# Patient Record
Sex: FEMALE | Race: WHITE | NOT HISPANIC OR LATINO
[De-identification: names, ages, dates, MRNs, and addresses within clinical notes are randomized per-mention and may not be internally consistent; named-entity substitution may affect disease eponyms.]

---

## 2017-01-12 ENCOUNTER — RESULT REVIEW (OUTPATIENT)
Age: 82
End: 2017-01-12

## 2017-01-16 ENCOUNTER — NON-APPOINTMENT (OUTPATIENT)
Age: 82
End: 2017-01-16

## 2017-01-16 ENCOUNTER — APPOINTMENT (OUTPATIENT)
Dept: CARDIOLOGY | Facility: CLINIC | Age: 82
End: 2017-01-16

## 2017-01-16 VITALS
HEIGHT: 66 IN | BODY MASS INDEX: 22.5 KG/M2 | HEART RATE: 74 BPM | SYSTOLIC BLOOD PRESSURE: 148 MMHG | DIASTOLIC BLOOD PRESSURE: 70 MMHG | WEIGHT: 140 LBS

## 2017-01-17 ENCOUNTER — OTHER (OUTPATIENT)
Age: 82
End: 2017-01-17

## 2017-01-17 LAB
ALBUMIN SERPL ELPH-MCNC: 4.2 G/DL
ALP BLD-CCNC: 136 U/L
ALT SERPL-CCNC: 45 U/L
ANION GAP SERPL CALC-SCNC: 16 MMOL/L
AST SERPL-CCNC: 43 U/L
BASOPHILS # BLD AUTO: 0.05 K/UL
BASOPHILS NFR BLD AUTO: 0.7 %
BILIRUB SERPL-MCNC: 1 MG/DL
BUN SERPL-MCNC: 37 MG/DL
CALCIUM SERPL-MCNC: 10.1 MG/DL
CHLORIDE SERPL-SCNC: 98 MMOL/L
CHOLEST SERPL-MCNC: 129 MG/DL
CHOLEST/HDLC SERPL: 2.3 RATIO
CO2 SERPL-SCNC: 28 MMOL/L
CREAT SERPL-MCNC: 1.14 MG/DL
EOSINOPHIL # BLD AUTO: 0.26 K/UL
EOSINOPHIL NFR BLD AUTO: 3.6 %
GLUCOSE SERPL-MCNC: 143 MG/DL
HCT VFR BLD CALC: 39.3 %
HDLC SERPL-MCNC: 55 MG/DL
HGB BLD-MCNC: 12.4 G/DL
IMM GRANULOCYTES NFR BLD AUTO: 0.1 %
LDLC SERPL CALC-MCNC: 54 MG/DL
LYMPHOCYTES # BLD AUTO: 1.41 K/UL
LYMPHOCYTES NFR BLD AUTO: 19.3 %
MAN DIFF?: NORMAL
MCHC RBC-ENTMCNC: 31.6 GM/DL
MCHC RBC-ENTMCNC: 32.2 PG
MCV RBC AUTO: 102.1 FL
MONOCYTES # BLD AUTO: 0.77 K/UL
MONOCYTES NFR BLD AUTO: 10.5 %
NEUTROPHILS # BLD AUTO: 4.8 K/UL
NEUTROPHILS NFR BLD AUTO: 65.8 %
NT-PROBNP SERPL-MCNC: 2735 PG/ML
PLATELET # BLD AUTO: 197 K/UL
POTASSIUM SERPL-SCNC: 3.9 MMOL/L
PROT SERPL-MCNC: 7.4 G/DL
RBC # BLD: 3.85 M/UL
RBC # FLD: 14.9 %
SODIUM SERPL-SCNC: 142 MMOL/L
TRIGL SERPL-MCNC: 98 MG/DL
WBC # FLD AUTO: 7.3 K/UL

## 2017-02-18 ENCOUNTER — RX RENEWAL (OUTPATIENT)
Age: 82
End: 2017-02-18

## 2017-03-24 ENCOUNTER — OUTPATIENT (OUTPATIENT)
Dept: OUTPATIENT SERVICES | Facility: HOSPITAL | Age: 82
LOS: 1 days | End: 2017-03-24
Payer: MEDICARE

## 2017-03-24 ENCOUNTER — TRANSCRIPTION ENCOUNTER (OUTPATIENT)
Age: 82
End: 2017-03-24

## 2017-03-24 VITALS
WEIGHT: 138.45 LBS | OXYGEN SATURATION: 98 % | DIASTOLIC BLOOD PRESSURE: 55 MMHG | HEART RATE: 65 BPM | SYSTOLIC BLOOD PRESSURE: 166 MMHG | TEMPERATURE: 97 F | RESPIRATION RATE: 18 BRPM | HEIGHT: 63.5 IN

## 2017-03-24 VITALS
OXYGEN SATURATION: 95 % | HEART RATE: 59 BPM | DIASTOLIC BLOOD PRESSURE: 69 MMHG | SYSTOLIC BLOOD PRESSURE: 156 MMHG | RESPIRATION RATE: 14 BRPM

## 2017-03-24 DIAGNOSIS — Z98.890 OTHER SPECIFIED POSTPROCEDURAL STATES: Chronic | ICD-10-CM

## 2017-03-24 DIAGNOSIS — Z96.641 PRESENCE OF RIGHT ARTIFICIAL HIP JOINT: Chronic | ICD-10-CM

## 2017-03-24 DIAGNOSIS — H25.21 AGE-RELATED CATARACT, MORGAGNIAN TYPE, RIGHT EYE: ICD-10-CM

## 2017-03-24 PROCEDURE — 66984 XCAPSL CTRC RMVL W/O ECP: CPT | Mod: RT

## 2017-03-24 NOTE — ASU PATIENT PROFILE, ADULT - PSH
H/O dilation and curettage    History of right hip replacement    Polyp of Ureter  removed many years ago  S/P Tonsillectomy  as child

## 2017-03-24 NOTE — ASU PATIENT PROFILE, ADULT - PMH
Atrial fibrillation    H/O cardiac arrhythmia    Hyperlipidemia    Hypertension    MVP (Mitral Valve Prolapse)    Osteoarthritis

## 2017-03-24 NOTE — ASU DISCHARGE PLAN (ADULT/PEDIATRIC). - PT EDUC
Intraocular lens implant (IOL) Eye shield with instructions , sunglasses and eye kit given to patient./other (specify)/Implant card (specify)

## 2017-03-24 NOTE — ASU DISCHARGE PLAN (ADULT/PEDIATRIC). - NOTIFY
Bleeding that does not stop/Swelling that continues/Persistent Nausea and Vomiting/Pain not relieved by Medications/Fever greater than 101

## 2017-04-05 ENCOUNTER — RESULT REVIEW (OUTPATIENT)
Age: 82
End: 2017-04-05

## 2017-04-24 ENCOUNTER — APPOINTMENT (OUTPATIENT)
Dept: CARDIOLOGY | Facility: CLINIC | Age: 82
End: 2017-04-24

## 2017-04-24 ENCOUNTER — NON-APPOINTMENT (OUTPATIENT)
Age: 82
End: 2017-04-24

## 2017-04-24 VITALS
SYSTOLIC BLOOD PRESSURE: 138 MMHG | HEART RATE: 72 BPM | BODY MASS INDEX: 22.66 KG/M2 | HEIGHT: 66 IN | WEIGHT: 141 LBS | DIASTOLIC BLOOD PRESSURE: 70 MMHG

## 2017-05-03 ENCOUNTER — LABORATORY RESULT (OUTPATIENT)
Age: 82
End: 2017-05-03

## 2017-05-03 ENCOUNTER — RESULT REVIEW (OUTPATIENT)
Age: 82
End: 2017-05-03

## 2017-05-31 ENCOUNTER — RESULT REVIEW (OUTPATIENT)
Age: 82
End: 2017-05-31

## 2017-05-31 ENCOUNTER — LABORATORY RESULT (OUTPATIENT)
Age: 82
End: 2017-05-31

## 2017-06-28 ENCOUNTER — LABORATORY RESULT (OUTPATIENT)
Age: 82
End: 2017-06-28

## 2017-07-26 ENCOUNTER — LABORATORY RESULT (OUTPATIENT)
Age: 82
End: 2017-07-26

## 2017-08-23 ENCOUNTER — LABORATORY RESULT (OUTPATIENT)
Age: 82
End: 2017-08-23

## 2017-08-28 ENCOUNTER — NON-APPOINTMENT (OUTPATIENT)
Age: 82
End: 2017-08-28

## 2017-08-28 ENCOUNTER — APPOINTMENT (OUTPATIENT)
Dept: CARDIOLOGY | Facility: CLINIC | Age: 82
End: 2017-08-28
Payer: MEDICARE

## 2017-08-28 VITALS
SYSTOLIC BLOOD PRESSURE: 134 MMHG | BODY MASS INDEX: 22.98 KG/M2 | DIASTOLIC BLOOD PRESSURE: 60 MMHG | HEART RATE: 72 BPM | HEIGHT: 66 IN | WEIGHT: 143 LBS

## 2017-08-28 PROCEDURE — 99215 OFFICE O/P EST HI 40 MIN: CPT

## 2017-08-28 PROCEDURE — 93000 ELECTROCARDIOGRAM COMPLETE: CPT

## 2017-09-20 ENCOUNTER — LABORATORY RESULT (OUTPATIENT)
Age: 82
End: 2017-09-20

## 2017-09-21 ENCOUNTER — RESULT REVIEW (OUTPATIENT)
Age: 82
End: 2017-09-21

## 2017-10-18 ENCOUNTER — LABORATORY RESULT (OUTPATIENT)
Age: 82
End: 2017-10-18

## 2017-11-16 ENCOUNTER — LABORATORY RESULT (OUTPATIENT)
Age: 82
End: 2017-11-16

## 2017-12-04 ENCOUNTER — RX RENEWAL (OUTPATIENT)
Age: 82
End: 2017-12-04

## 2017-12-13 ENCOUNTER — LABORATORY RESULT (OUTPATIENT)
Age: 82
End: 2017-12-13

## 2017-12-13 ENCOUNTER — RESULT REVIEW (OUTPATIENT)
Age: 82
End: 2017-12-13

## 2018-01-10 ENCOUNTER — LABORATORY RESULT (OUTPATIENT)
Age: 83
End: 2018-01-10

## 2018-01-18 ENCOUNTER — APPOINTMENT (OUTPATIENT)
Dept: CARDIOLOGY | Facility: CLINIC | Age: 83
End: 2018-01-18
Payer: MEDICARE

## 2018-01-18 ENCOUNTER — NON-APPOINTMENT (OUTPATIENT)
Age: 83
End: 2018-01-18

## 2018-01-18 VITALS
DIASTOLIC BLOOD PRESSURE: 70 MMHG | WEIGHT: 147 LBS | BODY MASS INDEX: 23.63 KG/M2 | HEART RATE: 74 BPM | HEIGHT: 66 IN | SYSTOLIC BLOOD PRESSURE: 168 MMHG

## 2018-01-18 VITALS — SYSTOLIC BLOOD PRESSURE: 134 MMHG | DIASTOLIC BLOOD PRESSURE: 70 MMHG

## 2018-01-18 PROCEDURE — 99214 OFFICE O/P EST MOD 30 MIN: CPT

## 2018-01-18 PROCEDURE — 93000 ELECTROCARDIOGRAM COMPLETE: CPT

## 2018-02-08 ENCOUNTER — LABORATORY RESULT (OUTPATIENT)
Age: 83
End: 2018-02-08

## 2018-02-09 LAB
ALBUMIN SERPL ELPH-MCNC: 4.1 G/DL
ALP BLD-CCNC: 171 U/L
ALT SERPL-CCNC: 22 U/L
ANION GAP SERPL CALC-SCNC: 16 MMOL/L
AST SERPL-CCNC: 35 U/L
BILIRUB SERPL-MCNC: 1.4 MG/DL
BUN SERPL-MCNC: 32 MG/DL
CALCIUM SERPL-MCNC: 10.1 MG/DL
CHLORIDE SERPL-SCNC: 99 MMOL/L
CO2 SERPL-SCNC: 26 MMOL/L
CREAT SERPL-MCNC: 1.37 MG/DL
GLUCOSE SERPL-MCNC: 80 MG/DL
POTASSIUM SERPL-SCNC: 5.1 MMOL/L
PROT SERPL-MCNC: 7.5 G/DL
SODIUM SERPL-SCNC: 141 MMOL/L
TSH SERPL-ACNC: 3.14 UIU/ML

## 2018-02-21 ENCOUNTER — LABORATORY RESULT (OUTPATIENT)
Age: 83
End: 2018-02-21

## 2018-02-22 ENCOUNTER — MEDICATION RENEWAL (OUTPATIENT)
Age: 83
End: 2018-02-22

## 2018-03-01 ENCOUNTER — NON-APPOINTMENT (OUTPATIENT)
Age: 83
End: 2018-03-01

## 2018-03-01 ENCOUNTER — APPOINTMENT (OUTPATIENT)
Dept: CARDIOLOGY | Facility: CLINIC | Age: 83
End: 2018-03-01
Payer: MEDICARE

## 2018-03-01 VITALS — DIASTOLIC BLOOD PRESSURE: 60 MMHG | SYSTOLIC BLOOD PRESSURE: 148 MMHG

## 2018-03-01 VITALS
HEIGHT: 66 IN | BODY MASS INDEX: 22.5 KG/M2 | SYSTOLIC BLOOD PRESSURE: 154 MMHG | HEART RATE: 80 BPM | WEIGHT: 140 LBS | DIASTOLIC BLOOD PRESSURE: 68 MMHG

## 2018-03-01 DIAGNOSIS — R53.83 OTHER FATIGUE: ICD-10-CM

## 2018-03-01 LAB — INR PPP: 2.6 RATIO

## 2018-03-01 PROCEDURE — 85610 PROTHROMBIN TIME: CPT | Mod: QW

## 2018-03-01 PROCEDURE — 93000 ELECTROCARDIOGRAM COMPLETE: CPT

## 2018-03-01 PROCEDURE — 99214 OFFICE O/P EST MOD 30 MIN: CPT

## 2018-03-22 LAB
INR PPP: 2.99 RATIO
PT BLD: 34.6 SEC

## 2018-04-19 LAB
INR PPP: 3.21 RATIO
PT BLD: 37.1 SEC

## 2018-04-24 ENCOUNTER — RESULT REVIEW (OUTPATIENT)
Age: 83
End: 2018-04-24

## 2018-05-02 ENCOUNTER — RESULT REVIEW (OUTPATIENT)
Age: 83
End: 2018-05-02

## 2018-05-03 LAB
INR PPP: 2.16 RATIO
PT BLD: 24.8 SEC

## 2018-05-17 LAB
INR PPP: 2.21 RATIO
PT BLD: 25.4 SEC

## 2018-06-04 ENCOUNTER — APPOINTMENT (OUTPATIENT)
Dept: CARDIOLOGY | Facility: CLINIC | Age: 83
End: 2018-06-04
Payer: MEDICARE

## 2018-06-04 VITALS
HEIGHT: 66 IN | HEART RATE: 76 BPM | SYSTOLIC BLOOD PRESSURE: 132 MMHG | WEIGHT: 135 LBS | DIASTOLIC BLOOD PRESSURE: 68 MMHG | BODY MASS INDEX: 21.69 KG/M2

## 2018-06-04 PROCEDURE — 99214 OFFICE O/P EST MOD 30 MIN: CPT

## 2018-06-04 PROCEDURE — 93000 ELECTROCARDIOGRAM COMPLETE: CPT

## 2018-06-12 ENCOUNTER — OTHER (OUTPATIENT)
Age: 83
End: 2018-06-12

## 2018-06-13 ENCOUNTER — MEDICATION RENEWAL (OUTPATIENT)
Age: 83
End: 2018-06-13

## 2018-06-14 LAB
INR PPP: 2.56 RATIO
PT BLD: 29.5 SEC

## 2018-06-27 ENCOUNTER — LABORATORY RESULT (OUTPATIENT)
Age: 83
End: 2018-06-27

## 2018-06-28 ENCOUNTER — RESULT REVIEW (OUTPATIENT)
Age: 83
End: 2018-06-28

## 2018-07-11 LAB
INR PPP: 2.12 RATIO
PT BLD: 24.3 SEC

## 2018-07-25 LAB
INR PPP: 2 RATIO
PT BLD: 22.9 SEC

## 2018-08-08 ENCOUNTER — LABORATORY RESULT (OUTPATIENT)
Age: 83
End: 2018-08-08

## 2018-08-14 LAB
INR PPP: 2.26
PT BLD: 25.2

## 2018-08-22 ENCOUNTER — LABORATORY RESULT (OUTPATIENT)
Age: 83
End: 2018-08-22

## 2018-09-06 LAB
INR PPP: 2.02 RATIO
PT BLD: 23.1 SEC

## 2018-09-19 ENCOUNTER — LABORATORY RESULT (OUTPATIENT)
Age: 83
End: 2018-09-19

## 2018-10-03 ENCOUNTER — LABORATORY RESULT (OUTPATIENT)
Age: 83
End: 2018-10-03

## 2018-10-08 ENCOUNTER — APPOINTMENT (OUTPATIENT)
Dept: CARDIOLOGY | Facility: CLINIC | Age: 83
End: 2018-10-08
Payer: MEDICARE

## 2018-10-08 ENCOUNTER — NON-APPOINTMENT (OUTPATIENT)
Age: 83
End: 2018-10-08

## 2018-10-08 VITALS
HEIGHT: 66 IN | WEIGHT: 135 LBS | OXYGEN SATURATION: 94 % | HEART RATE: 69 BPM | SYSTOLIC BLOOD PRESSURE: 130 MMHG | DIASTOLIC BLOOD PRESSURE: 60 MMHG | BODY MASS INDEX: 21.69 KG/M2

## 2018-10-08 PROBLEM — Z86.79 PERSONAL HISTORY OF OTHER DISEASES OF THE CIRCULATORY SYSTEM: Chronic | Status: ACTIVE | Noted: 2017-03-24

## 2018-10-08 PROBLEM — I48.91 UNSPECIFIED ATRIAL FIBRILLATION: Chronic | Status: ACTIVE | Noted: 2017-03-24

## 2018-10-08 PROCEDURE — 93000 ELECTROCARDIOGRAM COMPLETE: CPT

## 2018-10-08 PROCEDURE — 99214 OFFICE O/P EST MOD 30 MIN: CPT

## 2018-10-08 PROCEDURE — 90662 IIV NO PRSV INCREASED AG IM: CPT

## 2018-10-08 PROCEDURE — G0008: CPT

## 2018-10-17 ENCOUNTER — LABORATORY RESULT (OUTPATIENT)
Age: 83
End: 2018-10-17

## 2018-10-31 ENCOUNTER — LABORATORY RESULT (OUTPATIENT)
Age: 83
End: 2018-10-31

## 2018-11-14 ENCOUNTER — LABORATORY RESULT (OUTPATIENT)
Age: 83
End: 2018-11-14

## 2018-11-28 ENCOUNTER — LABORATORY RESULT (OUTPATIENT)
Age: 83
End: 2018-11-28

## 2018-11-28 ENCOUNTER — RESULT REVIEW (OUTPATIENT)
Age: 83
End: 2018-11-28

## 2018-12-03 ENCOUNTER — RX RENEWAL (OUTPATIENT)
Age: 83
End: 2018-12-03

## 2018-12-12 ENCOUNTER — LABORATORY RESULT (OUTPATIENT)
Age: 83
End: 2018-12-12

## 2018-12-12 ENCOUNTER — RESULT REVIEW (OUTPATIENT)
Age: 83
End: 2018-12-12

## 2018-12-22 ENCOUNTER — EMERGENCY (EMERGENCY)
Facility: HOSPITAL | Age: 83
LOS: 1 days | Discharge: HOME | End: 2018-12-22
Attending: EMERGENCY MEDICINE
Payer: MEDICARE

## 2018-12-22 VITALS
OXYGEN SATURATION: 95 % | SYSTOLIC BLOOD PRESSURE: 162 MMHG | TEMPERATURE: 98 F | HEART RATE: 73 BPM | DIASTOLIC BLOOD PRESSURE: 73 MMHG | RESPIRATION RATE: 18 BRPM

## 2018-12-22 VITALS — WEIGHT: 136.91 LBS

## 2018-12-22 DIAGNOSIS — Z98.890 OTHER SPECIFIED POSTPROCEDURAL STATES: Chronic | ICD-10-CM

## 2018-12-22 DIAGNOSIS — Z96.641 PRESENCE OF RIGHT ARTIFICIAL HIP JOINT: Chronic | ICD-10-CM

## 2018-12-22 LAB
ALBUMIN SERPL ELPH-MCNC: 4.4 G/DL — SIGNIFICANT CHANGE UP (ref 3.3–5)
ALP SERPL-CCNC: 154 U/L — HIGH (ref 40–120)
ALT FLD-CCNC: 30 U/L — SIGNIFICANT CHANGE UP (ref 10–45)
ANION GAP SERPL CALC-SCNC: 13 MMOL/L — SIGNIFICANT CHANGE UP (ref 5–17)
APTT BLD: 37.2 SEC — HIGH (ref 27.5–36.3)
AST SERPL-CCNC: 33 U/L — SIGNIFICANT CHANGE UP (ref 10–40)
BILIRUB SERPL-MCNC: 1.5 MG/DL — HIGH (ref 0.2–1.2)
BUN SERPL-MCNC: 33 MG/DL — HIGH (ref 7–23)
CALCIUM SERPL-MCNC: 10.6 MG/DL — HIGH (ref 8.4–10.5)
CHLORIDE SERPL-SCNC: 97 MMOL/L — SIGNIFICANT CHANGE UP (ref 96–108)
CO2 SERPL-SCNC: 27 MMOL/L — SIGNIFICANT CHANGE UP (ref 22–31)
CREAT SERPL-MCNC: 1.08 MG/DL — SIGNIFICANT CHANGE UP (ref 0.5–1.3)
GLUCOSE SERPL-MCNC: 123 MG/DL — HIGH (ref 70–99)
HCT VFR BLD CALC: 42.1 % — SIGNIFICANT CHANGE UP (ref 34.5–45)
HGB BLD-MCNC: 14.4 G/DL — SIGNIFICANT CHANGE UP (ref 11.5–15.5)
INR BLD: 2.19 RATIO — HIGH (ref 0.88–1.16)
MCHC RBC-ENTMCNC: 34.1 PG — HIGH (ref 27–34)
MCHC RBC-ENTMCNC: 34.2 GM/DL — SIGNIFICANT CHANGE UP (ref 32–36)
MCV RBC AUTO: 99.9 FL — SIGNIFICANT CHANGE UP (ref 80–100)
PLATELET # BLD AUTO: 191 K/UL — SIGNIFICANT CHANGE UP (ref 150–400)
POTASSIUM SERPL-MCNC: 4.3 MMOL/L — SIGNIFICANT CHANGE UP (ref 3.5–5.3)
POTASSIUM SERPL-SCNC: 4.3 MMOL/L — SIGNIFICANT CHANGE UP (ref 3.5–5.3)
PROT SERPL-MCNC: 8.1 G/DL — SIGNIFICANT CHANGE UP (ref 6–8.3)
PROTHROM AB SERPL-ACNC: 25.8 SEC — HIGH (ref 10–12.9)
RBC # BLD: 4.22 M/UL — SIGNIFICANT CHANGE UP (ref 3.8–5.2)
RBC # FLD: 12.9 % — SIGNIFICANT CHANGE UP (ref 10.3–14.5)
SODIUM SERPL-SCNC: 137 MMOL/L — SIGNIFICANT CHANGE UP (ref 135–145)
TROPONIN T, HIGH SENSITIVITY RESULT: 20 NG/L — SIGNIFICANT CHANGE UP (ref 0–51)
TROPONIN T, HIGH SENSITIVITY RESULT: 21 NG/L — SIGNIFICANT CHANGE UP (ref 0–51)
WBC # BLD: 4.7 K/UL — SIGNIFICANT CHANGE UP (ref 3.8–10.5)
WBC # FLD AUTO: 4.7 K/UL — SIGNIFICANT CHANGE UP (ref 3.8–10.5)

## 2018-12-22 PROCEDURE — 99284 EMERGENCY DEPT VISIT MOD MDM: CPT | Mod: GC

## 2018-12-22 PROCEDURE — 83880 ASSAY OF NATRIURETIC PEPTIDE: CPT

## 2018-12-22 PROCEDURE — 84484 ASSAY OF TROPONIN QUANT: CPT

## 2018-12-22 PROCEDURE — 85027 COMPLETE CBC AUTOMATED: CPT

## 2018-12-22 PROCEDURE — 99284 EMERGENCY DEPT VISIT MOD MDM: CPT | Mod: 25

## 2018-12-22 PROCEDURE — 80053 COMPREHEN METABOLIC PANEL: CPT

## 2018-12-22 PROCEDURE — 85610 PROTHROMBIN TIME: CPT

## 2018-12-22 PROCEDURE — 71045 X-RAY EXAM CHEST 1 VIEW: CPT

## 2018-12-22 PROCEDURE — 93005 ELECTROCARDIOGRAM TRACING: CPT

## 2018-12-22 PROCEDURE — 71045 X-RAY EXAM CHEST 1 VIEW: CPT | Mod: 26

## 2018-12-22 PROCEDURE — 85730 THROMBOPLASTIN TIME PARTIAL: CPT

## 2018-12-22 NOTE — ED ADULT NURSE NOTE - OBJECTIVE STATEMENT
[id number] 89 yr old Wf brought to ER by son for further eval and tx of chest pain 10 days ago, last night and again this morning. No c/o chest pain at present. PMH A fib. color pink. skin W&D. lungs clear. abd soft. EKG was done in triage, Cardiac monitor applied. Dr lutz pt [id number] 89 yr old Wf brought to ER by son for further eval and tx of chest pain 10 days ago, last night and again this morning. No c/o chest pain at present. PMH A fib. color pink. skin W&D. lungs clear. abd soft. EKG was done in triage, Cardiac monitor applied. Dr lutz pt. Fall risk precautions maintained

## 2018-12-22 NOTE — ED ADULT NURSE NOTE - NSIMPLEMENTINTERV_GEN_ALL_ED
Implemented All Fall with Harm Risk Interventions:  [place] to call system. Call bell, personal items and telephone within reach. Instruct patient to call for assistance. Room bathroom lighting operational. Non-slip footwear when patient is off stretcher. Physically safe environment: no spills, clutter or unnecessary equipment. Stretcher in lowest position, wheels locked, appropriate side rails in place. Provide visual cue, wrist band, yellow gown, etc. Monitor gait and stability. Monitor for mental status changes and reorient to person, place, and time. Review medications for side effects contributing to fall risk. Reinforce activity limits and safety measures with patient and family. Provide visual clues: red socks.

## 2018-12-22 NOTE — ED PROVIDER NOTE - ATTENDING CONTRIBUTION TO CARE
Patient with 1 day of atypical chest pain with pain rolling to the left. side. No n/v/d/cp/sob.   No pain on exertion.   GEN: NAD, awake, eyes open spontaneously  HEENT: NCAT, MMM, Trachea midline, normal conjunctiva, perrl  CHEST/LUNGS: Non-tachypneic, CTAB, bilateral breath sounds  CARDIAC: Non-tachycardic, normal perfusion  ABDOMEN: Soft, NTND, No rebound/guarding  MSK: No edema, no gross deformity of extremities  SKIN: No rashes, no petechiae, no vesicles  NEURO: CN grossly intact, normal coordination, no focal motor or sensory deficits  PSYCH: Alert, appropriate, cooperative, with capacity and insight   Will get iv, cbc, cmp, chest pain is atypical and will discuss with her cardiology team after CE and cxr  labs and ce within normal limits including trend of serial enzymes  will have patient  follow up this week with cardiology.   The patient was re-examined after interventions and is feeling much better.  The patient will follow up with their primary physician this week.   No immediate life threatening issues present on history or clinical exam. Patient is a safe disposition home, has capacity and insight into their condition, is ambulatory in the Emergency Department with no further questions and will follow up with their doctor(s) this week. Patient and family  understand anticipatory guidance were given strict return and follow up precautions.  The patient and family have been informed of all concerning signs and symptoms to return to Emergency Department, the necessity to follow up with the PMD/Clinic/follow up provided within 2-3 days was explained, and the patient and/or family reports understanding of above with capacity and insight.

## 2018-12-22 NOTE — ED PROVIDER NOTE - NS ED ROS FT
CONSTITUTIONAL: No fevers, no chills  Eyes: no visual changes  Ears: no ear drainage, no ear pain  Nose: no nasal congestion  Mouth/Throat: no sore throat  Cardiovascular: + Chest pain  Respiratory: + SOB  Gastrointestinal: No n/v/d, no abd pain  Genitourinary: no dysuria, no hematuria  SKIN: no rashes.  NEURO: no headache

## 2018-12-22 NOTE — ED PROVIDER NOTE - NSFOLLOWUPINSTRUCTIONS_ED_ALL_ED_FT
1. Return to ED for worsening, progressive or any other concerning symptoms   2. Follow up with your primary care doctor in 2-3days  3. Follow up with your cardiologist let them know you were seen in the emergency department for your symptoms.

## 2018-12-22 NOTE — ED PROVIDER NOTE - PROGRESS NOTE DETAILS
Spoke with Dr. Obando, agrees with plan for d/c with cardiology follow up. WIll d/c home, pt remains asymptomatic. Spoke with Dr. Obando, agrees with plan for d/c with cardiology follow up as per discussion and is agreeable with the Dr. Green as Dr. Allen cardiology coverage. WIll d/c home, pt remains asymptomatic.

## 2018-12-22 NOTE — ED PROVIDER NOTE - OBJECTIVE STATEMENT
89 YOF AF HTN, HLD p/w chest pain which occurred overnight while she was laying on her left side. Pt states she couldn't sleep on her left side because of the pain. Pt states the pain was pressure like, non-radiating. +prior episode 10 days ago which pt had blood work done at her pmd with unremarkable EKG. Pt deneis any previous stents. Pt denies any swelling in legs. Pt deneis any current chest pain or SOB. Pt denies fever, chills, cough. Pt is on coumadin. Pt felt like she had to take deep breathes while she was having the chest pain and felt a little SOB at the time. 89 YOF AF HTN, HLD p/w chest pain which occurred overnight while she was laying on her left side. Pt states she couldn't sleep on her left side because of the pain. Pt states the pain was pressure like, non-radiating. +prior episode 10 days ago which pt had blood work done at her pmd with unremarkable EKG. Pt denies any previous stents. Pt denies any swelling in legs. Pt denies any current chest pain or SOB. Pt denies fever, chills, cough. Pt is on coumadin. Pt felt like she had to take deep breathes while she was having the chest pain and felt a little SOB at the time.

## 2018-12-26 ENCOUNTER — LABORATORY RESULT (OUTPATIENT)
Age: 83
End: 2018-12-26

## 2019-01-07 ENCOUNTER — RX RENEWAL (OUTPATIENT)
Age: 84
End: 2019-01-07

## 2019-01-09 ENCOUNTER — LABORATORY RESULT (OUTPATIENT)
Age: 84
End: 2019-01-09

## 2019-01-23 ENCOUNTER — LABORATORY RESULT (OUTPATIENT)
Age: 84
End: 2019-01-23

## 2019-02-06 ENCOUNTER — LABORATORY RESULT (OUTPATIENT)
Age: 84
End: 2019-02-06

## 2019-02-20 ENCOUNTER — LABORATORY RESULT (OUTPATIENT)
Age: 84
End: 2019-02-20

## 2019-02-25 ENCOUNTER — APPOINTMENT (OUTPATIENT)
Dept: CARDIOLOGY | Facility: CLINIC | Age: 84
End: 2019-02-25
Payer: MEDICARE

## 2019-02-25 ENCOUNTER — NON-APPOINTMENT (OUTPATIENT)
Age: 84
End: 2019-02-25

## 2019-02-25 VITALS
HEART RATE: 62 BPM | DIASTOLIC BLOOD PRESSURE: 70 MMHG | BODY MASS INDEX: 21.69 KG/M2 | OXYGEN SATURATION: 92 % | SYSTOLIC BLOOD PRESSURE: 149 MMHG | WEIGHT: 135 LBS | HEIGHT: 66 IN

## 2019-02-25 PROCEDURE — 99214 OFFICE O/P EST MOD 30 MIN: CPT

## 2019-02-25 PROCEDURE — 93000 ELECTROCARDIOGRAM COMPLETE: CPT

## 2019-02-25 NOTE — REASON FOR VISIT
[Hyperlipidemia] : hyperlipidemia [Hypertension] : hypertension [Mitral Regurgitation] : mitral regurgitation [FreeTextEntry1] : edema

## 2019-02-25 NOTE — HISTORY OF PRESENT ILLNESS
[FreeTextEntry1] : Mrs. Eric presents in followup of  atrial fibrillation, anticoagulation management and mitral regurgitation.  She reports that she has been feeling well. Presented to [facility] emergency Department physician. The left arm pain on December 22. ECG was unchanged. Troponins were 20 and 21. Pro BNP was Borderline elevated.  She was discharged home without change in her regimen Mild unchanged easy fatigability, which has not interfered w/ her exercise tolerance..   She continues to live independently, and does all her housework, shopping, and errands. Minimal dependent edema\par \par No c/o chest, throat,jaw, arm or upper back discomfort.  Transient dyspnea with bending. No orthopnea or PND.  No palpitations, dizziness or syncope.  No claudication.\par \par \par

## 2019-03-06 ENCOUNTER — LABORATORY RESULT (OUTPATIENT)
Age: 84
End: 2019-03-06

## 2019-03-20 ENCOUNTER — LABORATORY RESULT (OUTPATIENT)
Age: 84
End: 2019-03-20

## 2019-04-03 ENCOUNTER — LABORATORY RESULT (OUTPATIENT)
Age: 84
End: 2019-04-03

## 2019-04-04 ENCOUNTER — RESULT REVIEW (OUTPATIENT)
Age: 84
End: 2019-04-04

## 2019-04-17 ENCOUNTER — LABORATORY RESULT (OUTPATIENT)
Age: 84
End: 2019-04-17

## 2019-04-19 ENCOUNTER — INPATIENT (INPATIENT)
Facility: HOSPITAL | Age: 84
LOS: 4 days | Discharge: HOME | DRG: 871 | End: 2019-04-24
Attending: STUDENT IN AN ORGANIZED HEALTH CARE EDUCATION/TRAINING PROGRAM | Admitting: HOSPITALIST
Payer: MEDICARE

## 2019-04-19 VITALS
SYSTOLIC BLOOD PRESSURE: 182 MMHG | HEIGHT: 64 IN | RESPIRATION RATE: 24 BRPM | HEART RATE: 90 BPM | OXYGEN SATURATION: 82 % | WEIGHT: 138.01 LBS | DIASTOLIC BLOOD PRESSURE: 74 MMHG | TEMPERATURE: 102 F

## 2019-04-19 DIAGNOSIS — J10.1 INFLUENZA DUE TO OTHER IDENTIFIED INFLUENZA VIRUS WITH OTHER RESPIRATORY MANIFESTATIONS: ICD-10-CM

## 2019-04-19 DIAGNOSIS — I48.0 PAROXYSMAL ATRIAL FIBRILLATION: ICD-10-CM

## 2019-04-19 DIAGNOSIS — A41.9 SEPSIS, UNSPECIFIED ORGANISM: ICD-10-CM

## 2019-04-19 DIAGNOSIS — Z71.89 OTHER SPECIFIED COUNSELING: ICD-10-CM

## 2019-04-19 DIAGNOSIS — J96.01 ACUTE RESPIRATORY FAILURE WITH HYPOXIA: ICD-10-CM

## 2019-04-19 DIAGNOSIS — J15.9 UNSPECIFIED BACTERIAL PNEUMONIA: ICD-10-CM

## 2019-04-19 DIAGNOSIS — J18.9 PNEUMONIA, UNSPECIFIED ORGANISM: ICD-10-CM

## 2019-04-19 DIAGNOSIS — J12.9 VIRAL PNEUMONIA, UNSPECIFIED: ICD-10-CM

## 2019-04-19 DIAGNOSIS — Z29.9 ENCOUNTER FOR PROPHYLACTIC MEASURES, UNSPECIFIED: ICD-10-CM

## 2019-04-19 DIAGNOSIS — N18.4 CHRONIC KIDNEY DISEASE, STAGE 4 (SEVERE): ICD-10-CM

## 2019-04-19 DIAGNOSIS — I10 ESSENTIAL (PRIMARY) HYPERTENSION: ICD-10-CM

## 2019-04-19 DIAGNOSIS — E78.5 HYPERLIPIDEMIA, UNSPECIFIED: ICD-10-CM

## 2019-04-19 DIAGNOSIS — N18.3 CHRONIC KIDNEY DISEASE, STAGE 3 (MODERATE): ICD-10-CM

## 2019-04-19 DIAGNOSIS — Z98.890 OTHER SPECIFIED POSTPROCEDURAL STATES: Chronic | ICD-10-CM

## 2019-04-19 DIAGNOSIS — R74.8 ABNORMAL LEVELS OF OTHER SERUM ENZYMES: ICD-10-CM

## 2019-04-19 DIAGNOSIS — R09.89 OTHER SPECIFIED SYMPTOMS AND SIGNS INVOLVING THE CIRCULATORY AND RESPIRATORY SYSTEMS: ICD-10-CM

## 2019-04-19 DIAGNOSIS — Z96.641 PRESENCE OF RIGHT ARTIFICIAL HIP JOINT: Chronic | ICD-10-CM

## 2019-04-19 LAB
ALBUMIN SERPL ELPH-MCNC: 4.1 G/DL — SIGNIFICANT CHANGE UP (ref 3.3–5)
ALP SERPL-CCNC: 171 U/L — HIGH (ref 40–120)
ALT FLD-CCNC: 32 U/L — SIGNIFICANT CHANGE UP (ref 10–45)
ANION GAP SERPL CALC-SCNC: 14 MMOL/L — SIGNIFICANT CHANGE UP (ref 5–17)
APPEARANCE UR: CLEAR — SIGNIFICANT CHANGE UP
APTT BLD: 39.1 SEC — HIGH (ref 27.5–36.3)
AST SERPL-CCNC: 55 U/L — HIGH (ref 10–40)
BACTERIA # UR AUTO: NEGATIVE — SIGNIFICANT CHANGE UP
BASOPHILS # BLD AUTO: 0 K/UL — SIGNIFICANT CHANGE UP (ref 0–0.2)
BASOPHILS NFR BLD AUTO: 0.5 % — SIGNIFICANT CHANGE UP (ref 0–2)
BILIRUB SERPL-MCNC: 1.3 MG/DL — HIGH (ref 0.2–1.2)
BILIRUB UR-MCNC: NEGATIVE — SIGNIFICANT CHANGE UP
BUN SERPL-MCNC: 25 MG/DL — HIGH (ref 7–23)
CALCIUM SERPL-MCNC: 9.8 MG/DL — SIGNIFICANT CHANGE UP (ref 8.4–10.5)
CHLORIDE SERPL-SCNC: 95 MMOL/L — LOW (ref 96–108)
CO2 SERPL-SCNC: 27 MMOL/L — SIGNIFICANT CHANGE UP (ref 22–31)
COLOR SPEC: SIGNIFICANT CHANGE UP
CREAT SERPL-MCNC: 1.14 MG/DL — SIGNIFICANT CHANGE UP (ref 0.5–1.3)
DIFF PNL FLD: ABNORMAL
EOSINOPHIL # BLD AUTO: 0 K/UL — SIGNIFICANT CHANGE UP (ref 0–0.5)
EOSINOPHIL NFR BLD AUTO: 0.4 % — SIGNIFICANT CHANGE UP (ref 0–6)
EPI CELLS # UR: 0 /HPF — SIGNIFICANT CHANGE UP
FLUAV H1 2009 PAND RNA SPEC QL NAA+PROBE: DETECTED
FLUAV SUBTYP SPEC NAA+PROBE: DETECTED
GAS PNL BLDV: SIGNIFICANT CHANGE UP
GLUCOSE SERPL-MCNC: 130 MG/DL — HIGH (ref 70–99)
GLUCOSE UR QL: NEGATIVE — SIGNIFICANT CHANGE UP
HCOV PNL SPEC NAA+PROBE: DETECTED
HCT VFR BLD CALC: 40.4 % — SIGNIFICANT CHANGE UP (ref 34.5–45)
HGB BLD-MCNC: 13.4 G/DL — SIGNIFICANT CHANGE UP (ref 11.5–15.5)
HYALINE CASTS # UR AUTO: 0 /LPF — SIGNIFICANT CHANGE UP (ref 0–2)
INR BLD: 3.17 RATIO — HIGH (ref 0.88–1.16)
KETONES UR-MCNC: NEGATIVE — SIGNIFICANT CHANGE UP
LEUKOCYTE ESTERASE UR-ACNC: NEGATIVE — SIGNIFICANT CHANGE UP
LYMPHOCYTES # BLD AUTO: 0.9 K/UL — LOW (ref 1–3.3)
LYMPHOCYTES # BLD AUTO: 13.3 % — SIGNIFICANT CHANGE UP (ref 13–44)
MAGNESIUM SERPL-MCNC: 2 MG/DL — SIGNIFICANT CHANGE UP (ref 1.6–2.6)
MCHC RBC-ENTMCNC: 33.2 GM/DL — SIGNIFICANT CHANGE UP (ref 32–36)
MCHC RBC-ENTMCNC: 33.6 PG — SIGNIFICANT CHANGE UP (ref 27–34)
MCV RBC AUTO: 101 FL — HIGH (ref 80–100)
MONOCYTES # BLD AUTO: 0.8 K/UL — SIGNIFICANT CHANGE UP (ref 0–0.9)
MONOCYTES NFR BLD AUTO: 12.2 % — SIGNIFICANT CHANGE UP (ref 2–14)
NEUTROPHILS # BLD AUTO: 4.8 K/UL — SIGNIFICANT CHANGE UP (ref 1.8–7.4)
NEUTROPHILS NFR BLD AUTO: 73.5 % — SIGNIFICANT CHANGE UP (ref 43–77)
NITRITE UR-MCNC: NEGATIVE — SIGNIFICANT CHANGE UP
PH UR: 6 — SIGNIFICANT CHANGE UP (ref 5–8)
PHOSPHATE SERPL-MCNC: 2.4 MG/DL — LOW (ref 2.5–4.5)
PLATELET # BLD AUTO: 139 K/UL — LOW (ref 150–400)
POTASSIUM SERPL-MCNC: 4.4 MMOL/L — SIGNIFICANT CHANGE UP (ref 3.5–5.3)
POTASSIUM SERPL-SCNC: 4.4 MMOL/L — SIGNIFICANT CHANGE UP (ref 3.5–5.3)
PROCALCITONIN SERPL-MCNC: 0.24 NG/ML — HIGH (ref 0.02–0.1)
PROT SERPL-MCNC: 7.9 G/DL — SIGNIFICANT CHANGE UP (ref 6–8.3)
PROT UR-MCNC: 100 — SIGNIFICANT CHANGE UP
PROTHROM AB SERPL-ACNC: 37.4 SEC — HIGH (ref 10–12.9)
RAPID RVP RESULT: DETECTED
RBC # BLD: 4 M/UL — SIGNIFICANT CHANGE UP (ref 3.8–5.2)
RBC # FLD: 12.8 % — SIGNIFICANT CHANGE UP (ref 10.3–14.5)
RBC CASTS # UR COMP ASSIST: 10 /HPF — HIGH (ref 0–4)
SODIUM SERPL-SCNC: 136 MMOL/L — SIGNIFICANT CHANGE UP (ref 135–145)
SP GR SPEC: 1.01 — SIGNIFICANT CHANGE UP (ref 1.01–1.02)
UROBILINOGEN FLD QL: NEGATIVE — SIGNIFICANT CHANGE UP
WBC # BLD: 6.6 K/UL — SIGNIFICANT CHANGE UP (ref 3.8–10.5)
WBC # FLD AUTO: 6.6 K/UL — SIGNIFICANT CHANGE UP (ref 3.8–10.5)
WBC UR QL: 7 /HPF — HIGH (ref 0–5)

## 2019-04-19 PROCEDURE — 93010 ELECTROCARDIOGRAM REPORT: CPT

## 2019-04-19 PROCEDURE — 71045 X-RAY EXAM CHEST 1 VIEW: CPT | Mod: 26

## 2019-04-19 PROCEDURE — 99497 ADVNCD CARE PLAN 30 MIN: CPT | Mod: 25

## 2019-04-19 PROCEDURE — 99285 EMERGENCY DEPT VISIT HI MDM: CPT | Mod: 25

## 2019-04-19 PROCEDURE — 71250 CT THORAX DX C-: CPT | Mod: 26

## 2019-04-19 PROCEDURE — 99223 1ST HOSP IP/OBS HIGH 75: CPT | Mod: GC,25

## 2019-04-19 RX ORDER — IPRATROPIUM/ALBUTEROL SULFATE 18-103MCG
3 AEROSOL WITH ADAPTER (GRAM) INHALATION EVERY 6 HOURS
Qty: 0 | Refills: 0 | Status: DISCONTINUED | OUTPATIENT
Start: 2019-04-19 | End: 2019-04-24

## 2019-04-19 RX ORDER — AZITHROMYCIN 500 MG/1
500 TABLET, FILM COATED ORAL EVERY 24 HOURS
Qty: 0 | Refills: 0 | Status: DISCONTINUED | OUTPATIENT
Start: 2019-04-20 | End: 2019-04-22

## 2019-04-19 RX ORDER — CEFTRIAXONE 500 MG/1
1 INJECTION, POWDER, FOR SOLUTION INTRAMUSCULAR; INTRAVENOUS EVERY 24 HOURS
Qty: 0 | Refills: 0 | Status: DISCONTINUED | OUTPATIENT
Start: 2019-04-20 | End: 2019-04-22

## 2019-04-19 RX ORDER — AZITHROMYCIN 500 MG/1
500 TABLET, FILM COATED ORAL ONCE
Qty: 0 | Refills: 0 | Status: COMPLETED | OUTPATIENT
Start: 2019-04-19 | End: 2019-04-19

## 2019-04-19 RX ORDER — ACETAMINOPHEN 500 MG
975 TABLET ORAL ONCE
Qty: 0 | Refills: 0 | Status: COMPLETED | OUTPATIENT
Start: 2019-04-19 | End: 2019-04-19

## 2019-04-19 RX ORDER — ATORVASTATIN CALCIUM 80 MG/1
10 TABLET, FILM COATED ORAL AT BEDTIME
Qty: 0 | Refills: 0 | Status: DISCONTINUED | OUTPATIENT
Start: 2019-04-19 | End: 2019-04-24

## 2019-04-19 RX ORDER — METOPROLOL TARTRATE 50 MG
25 TABLET ORAL
Qty: 0 | Refills: 0 | Status: DISCONTINUED | OUTPATIENT
Start: 2019-04-19 | End: 2019-04-19

## 2019-04-19 RX ORDER — CEFTRIAXONE 500 MG/1
1 INJECTION, POWDER, FOR SOLUTION INTRAMUSCULAR; INTRAVENOUS ONCE
Qty: 0 | Refills: 0 | Status: COMPLETED | OUTPATIENT
Start: 2019-04-19 | End: 2019-04-19

## 2019-04-19 RX ORDER — SODIUM CHLORIDE 9 MG/ML
1000 INJECTION INTRAMUSCULAR; INTRAVENOUS; SUBCUTANEOUS ONCE
Qty: 0 | Refills: 0 | Status: COMPLETED | OUTPATIENT
Start: 2019-04-19 | End: 2019-04-19

## 2019-04-19 RX ORDER — AZITHROMYCIN 500 MG/1
500 TABLET, FILM COATED ORAL EVERY 24 HOURS
Qty: 0 | Refills: 0 | Status: DISCONTINUED | OUTPATIENT
Start: 2019-04-19 | End: 2019-04-19

## 2019-04-19 RX ORDER — CEFTRIAXONE 500 MG/1
1 INJECTION, POWDER, FOR SOLUTION INTRAMUSCULAR; INTRAVENOUS EVERY 24 HOURS
Qty: 0 | Refills: 0 | Status: DISCONTINUED | OUTPATIENT
Start: 2019-04-19 | End: 2019-04-19

## 2019-04-19 RX ORDER — CALCIUM CARBONATE 500(1250)
1 TABLET ORAL DAILY
Qty: 0 | Refills: 0 | Status: DISCONTINUED | OUTPATIENT
Start: 2019-04-19 | End: 2019-04-24

## 2019-04-19 RX ORDER — HEPARIN SODIUM 5000 [USP'U]/ML
5000 INJECTION INTRAVENOUS; SUBCUTANEOUS EVERY 12 HOURS
Qty: 0 | Refills: 0 | Status: DISCONTINUED | OUTPATIENT
Start: 2019-04-19 | End: 2019-04-19

## 2019-04-19 RX ORDER — LOSARTAN POTASSIUM 100 MG/1
50 TABLET, FILM COATED ORAL DAILY
Qty: 0 | Refills: 0 | Status: DISCONTINUED | OUTPATIENT
Start: 2019-04-19 | End: 2019-04-19

## 2019-04-19 RX ADMIN — Medication 975 MILLIGRAM(S): at 16:00

## 2019-04-19 RX ADMIN — CEFTRIAXONE 1 GRAM(S): 500 INJECTION, POWDER, FOR SOLUTION INTRAMUSCULAR; INTRAVENOUS at 16:00

## 2019-04-19 RX ADMIN — Medication 30 MILLIGRAM(S): at 18:42

## 2019-04-19 RX ADMIN — SODIUM CHLORIDE 1000 MILLILITER(S): 9 INJECTION INTRAMUSCULAR; INTRAVENOUS; SUBCUTANEOUS at 16:15

## 2019-04-19 RX ADMIN — AZITHROMYCIN 500 MILLIGRAM(S): 500 TABLET, FILM COATED ORAL at 17:00

## 2019-04-19 RX ADMIN — AZITHROMYCIN 250 MILLIGRAM(S): 500 TABLET, FILM COATED ORAL at 16:00

## 2019-04-19 RX ADMIN — CEFTRIAXONE 100 GRAM(S): 500 INJECTION, POWDER, FOR SOLUTION INTRAMUSCULAR; INTRAVENOUS at 15:30

## 2019-04-19 RX ADMIN — CEFTRIAXONE 100 GRAM(S): 500 INJECTION, POWDER, FOR SOLUTION INTRAMUSCULAR; INTRAVENOUS at 22:11

## 2019-04-19 RX ADMIN — ATORVASTATIN CALCIUM 10 MILLIGRAM(S): 80 TABLET, FILM COATED ORAL at 22:12

## 2019-04-19 RX ADMIN — Medication 975 MILLIGRAM(S): at 15:30

## 2019-04-19 RX ADMIN — Medication 3 MILLILITER(S): at 23:53

## 2019-04-19 RX ADMIN — SODIUM CHLORIDE 333.33 MILLILITER(S): 9 INJECTION INTRAMUSCULAR; INTRAVENOUS; SUBCUTANEOUS at 15:15

## 2019-04-19 NOTE — ED ADULT NURSE REASSESSMENT NOTE - NS ED NURSE REASSESS COMMENT FT1
Report received from ZULMA Carney in [place].  Patient is admitted and pending bed assignment upstairs.

## 2019-04-19 NOTE — H&P ADULT - NSHPPHYSICALEXAM_GEN_ALL_CORE
Vital Signs Last 24 Hrs  T(C): 36.6 (19 Apr 2019 19:38), Max: 39.1 (19 Apr 2019 14:41)  T(F): 97.9 (19 Apr 2019 19:38), Max: 102.3 (19 Apr 2019 14:41)  HR: 78 (19 Apr 2019 19:38) (75 - 90)  BP: 105/65 (19 Apr 2019 19:38) (105/65 - 193/91)  BP(mean): --  RR: 24 (19 Apr 2019 19:38) (16 - 27)  SpO2: 94% (19 Apr 2019 19:38) (82% - 95%)    Appearance: Sitting in bed, NAD  HEENT:   Normal oral mucosa, PERRL, EOMI, anicteric sclera; NC in place  Lymphatic: No lymphadenopathy in cervical or axillary nodes  Cardiovascular: RRR, No murmurs, gallops or rubs appreciated  Respiratory: Lungs with expiratory wheezes, crackles in all fields; diminished breath sounds at bases  Gastrointestinal:  Soft, nondistended, nontender, +BS	  Skin: pettichie noted on shins of bilaterally  Neurologic: AOx3, CNII-XII grossly intact, motor and sensory function grossly intact  Extremities: Moving all extremities equally  Vascular: palpable dp, pt and radial pulses 2+ bilaterally  2+ edema noted bilaterally  Psych:  Normal mood and affect, responds to questions appropriately Vital Signs Last 24 Hrs  T(C): 36.6 (19 Apr 2019 19:38), Max: 39.1 (19 Apr 2019 14:41)  T(F): 97.9 (19 Apr 2019 19:38), Max: 102.3 (19 Apr 2019 14:41)  HR: 78 (19 Apr 2019 19:38) (75 - 90)  BP: 105/65 (19 Apr 2019 19:38) (105/65 - 193/91)  BP(mean): --  RR: 24 (19 Apr 2019 19:38) (16 - 27)  SpO2: 94% (19 Apr 2019 19:38) (82% - 95%)    General: Sitting in bed, NAD, awake and alert.   HEENT:   Normal oral mucosa, PERRL, EOMI, anicteric sclera; NC in place  Lymphatic: No lymphadenopathy in cervical or axillary nodes  Cardiovascular: RRR, No murmurs, gallops or rubs appreciated; left leg edema > right leg  Respiratory: Lungs with expiratory wheezes, crackles at bases; diminished breath sounds at bases  Gastrointestinal:  Soft, nondistended, nontender, +BS	  Skin: No overt rash.   Neurologic: AOx3, CNII-XII grossly intact, motor and sensory function grossly intact  Extremities: Moving all extremities equally in ROM and strength.   MSK: no joint erythema or swelling   Vascular: palpable dp, pt and radial pulses 2+ bilaterally  2+ edema noted bilaterally  Psych:  Normal mood and affect, responds to questions appropriately

## 2019-04-19 NOTE — H&P ADULT - NSHPSOCIALHISTORY_GEN_ALL_CORE
Lives at home alone, independent of ADLs and IADLs  Denies tobacco or drug use; uses EtOH occasionally.

## 2019-04-19 NOTE — H&P ADULT - PROBLEM SELECTOR PLAN 1
Pt presenting with pna 2/2 to flu and rhinovirus, no evidence of consolidation on CT chest.  Pt received ceftriaxone and azithro in ED.  - will check urine legionella  - will start tx with tamiflu  - Given no evidence for bacterial process (low procalcitonin, no consolidation on CT), will hold abx for now with low threshold to start if pt should decompensate. Pt presenting with pna 2/2 to flu and rhinovirus, no evidence of consolidation on CT chest.  Pt received ceftriaxone and azithro in ED.  - will check urine legionella  - will start tx with tamiflu  - c/w ceftriaxone and azithro until cultures come back neg due to concern for decompensation Pt presenting with acute desaturations in setting of pneumonia. Now on O2 4L. Without O2, pt desats to 85% with good waveform. Suspect this is driven by the infectious state rather than alternate dx like pulmonary embolism. Her faint crackles likely from pneumonia process rather than overt CHF. Will eventually require titration of her O2-requirements.  - c/w O2 and wean slowly  - will start duonebs q6  - chest pt  - start duonebs for airway clearance  - If worsening will consider steroids.  - Hold metoprolol tonight given potential to worsen bronchospasm. Will need to be restarted when respiratory status improves given hx of afib.

## 2019-04-19 NOTE — H&P ADULT - HISTORY OF PRESENT ILLNESS
89F PMH AF (on coumadin), HTN, HLD p/w productive cough x 4 days.  Pt states last week, her daughter-in-law was sick with cold-like symptoms.  Pt visited with her son and daugther in law intermittently in the last week and states for the last 4 days she has been having a "tickle in throat", running nose and chills.  Pt states she thought she had been catching a cold and thought it would get better.  She said hot tea made her feel better, nothing made it worse.  Because she was not getting better and feeling worse, pt went to PMD today for further care.  In the office of PMD, pt was found to be febrile to 102 and was sent to ED for further care.    Upon arrival, pt was febrile to 102.3F and had desaturation to 82%.  She was placed on NC.  She was given 1L fluids, ceftriaxone and azithro and admitted for further care.    Pt denied any associated nausea, vomiting, headaches, sob with the symptoms she has been having.  She denies fevers at home.  Pt lives home alone and is independent of ADLs and IADLs.  Her son lives nearby.

## 2019-04-19 NOTE — H&P ADULT - NSICDXPASTMEDICALHX_GEN_ALL_CORE_FT
PAST MEDICAL HISTORY:  Atrial fibrillation     H/O cardiac arrhythmia     Hyperlipidemia     Hypertension     MVP (Mitral Valve Prolapse)     Osteoarthritis

## 2019-04-19 NOTE — ED PROVIDER NOTE - ATTENDING CONTRIBUTION TO CARE
89 y/o female with the above documented history and HPI who on exam appears comfortable but does have a productive sounding cough. VSs noted, neck supple, lungs c/ B/L crackles, cardiac sounds c/ systolic murmur loudest @ LUSB, abdomen soft, NT/ND, extremities s/ asymmetry, calf ttp or palpable cord, skin s/ rash c/ trace B/L LE pitting edema and neurologically intact. Full investigation initiated. Anti-pyretic, IVF and ABXs provided. To be admitted.

## 2019-04-19 NOTE — ED PROVIDER NOTE - PROGRESS NOTE DETAILS
JULIAN Milner: paged González Miranda regarding admission recs. awaiting call back JULIAN Milner: discussed case with PMD Dr. González Miranda who agreed with admission and recommended admission to full time hospitalist

## 2019-04-19 NOTE — H&P ADULT - PROBLEM SELECTOR PLAN 4
Pt with elevated bili, alk phos, and alt.  Outpt bili has been 1.5 and alk phos has been in 160.  AST and ALT have been wnl  - will need workup outpatient for elevated enzymes. -Total tim mildly elevated along with AST. However she has no findings to support acute cholecystitis or acute cholangitis. Can consider abdominal RUQ u/s outpatient unless clinically there is a change while in hospital or her LFT uptrends. Pt presenting with fevers and tachypnea in the setting of flu positive, coronavirus positive and likely bacterial pneumonia which is indicative of sepsis.  - 1L fluids given in ED: will stop further IVF given faint crackles and enlarged heart on CXR.   - hold hydration given concern for fluid overload on exam, last echo did show nl LVEF in office, however given the impressive cardiac sillohoute would check TTE to assess for change .  - f/u bcx  - f/u Urine Cultures

## 2019-04-19 NOTE — H&P ADULT - PROBLEM SELECTOR PLAN 8
pt with known CKD4, GFR ~27, Cr 1.1 at baseline.  - will monitor. -Renally dose meds  -avoid nephrotoxins  -monitor lytes.

## 2019-04-19 NOTE — ED CLERICAL - NS ED CLERK NOTE PRE-ARRIVAL INFORMATION; ADDITIONAL PRE-ARRIVAL INFORMATION
CC/Reason For referral: pneumonia, hypertension, cardio vascular disease   Preferred Consultant(if applicable):  Who admits for you (if needed):  Do you have documents you would like to fax over?  Would you still like to speak to an ED attending?

## 2019-04-19 NOTE — ED PROVIDER NOTE - OBJECTIVE STATEMENT
89 y/o female PMHx HTN, HLD, MVP, atrial fib now presenting was with productive cough x4 days. Patient stated her sputum is yellow and feels MILLER. Reported generalized fatigue since onset of symptoms but unsure if have fevers at home. Patient has sick contacts at home with similar symptoms and received flu shot this year. Patient denied CP or pleuritic, abdominal pain, N/V/D, dizziness, headache, neck pain, SOB at rest, urinary complaints, recent travel 89 y/o female PMHx HTN, HLD, MVP, atrial fib now presenting was with productive cough x4 days. Patient stated her sputum is yellow and feels MILLER. Reported generalized fatigue since onset of symptoms but unsure if have fevers at home. Patient has sick contacts at home with similar symptoms and received flu shot this year. Has not had a recent hospitalization. Patient denied CP or pleuritic, abdominal pain, N/V/D, dizziness, headache, neck pain, SOB at rest, urinary complaints, recent travel

## 2019-04-19 NOTE — H&P ADULT - PROBLEM SELECTOR PLAN 6
c/w losartan.  c/w toresemide holding losartan and toresemide in setting of sepsis. holding losartan and toresemide in setting of sepsis. Resume losartan tomorrow if BP stable overnight. Pt with afib on coumadin, therapeutically anticoagulated.  - monitor INR for goal of 2-3.   - will hold metop in this setting given bronchospasm. Resume when respiratory status improves.

## 2019-04-19 NOTE — H&P ADULT - PROBLEM SELECTOR PLAN 7
c/w atorvastatin holding losartan and toresemide in setting of sepsis. Resume losartan tomorrow if BP stable overnight.

## 2019-04-19 NOTE — ED PROVIDER NOTE - CARE PLAN
Principal Discharge DX:	PNA (pneumonia) Principal Discharge DX:	PNA (pneumonia)  Secondary Diagnosis:	Flu

## 2019-04-19 NOTE — H&P ADULT - NSHPLABSRESULTS_GEN_ALL_CORE
136  |  95<L>  |  25<H>  ----------------------------<  130<H>  4.4   |  27  |  1.14    Ca    9.8      2019 15:59  Phos  2.4       Mg     2.0         TPro  7.9  /  Alb  4.1  /  TBili  1.3<H>  /  DBili  x   /  AST  55<H>  /  ALT  32  /  AlkPhos  171<H>        PT/INR - ( 2019 15:42 )   PT: 37.4 sec;   INR: 3.17 ratio         PTT - ( 2019 15:42 )  PTT:39.1 sec              Urinalysis Basic - ( 2019 15:59 )    Color: Light Yellow / Appearance: Clear / S.012 / pH: x  Gluc: x / Ketone: Negative  / Bili: Negative / Urobili: Negative   Blood: x / Protein: 100 / Nitrite: Negative   Leuk Esterase: Negative / RBC: 10 /hpf / WBC 7 /HPF   Sq Epi: x / Non Sq Epi: 0 /hpf / Bacteria: Negative                              13.4   6.6   )-----------( 139      ( 2019 15:42 )             40.4     CAPILLARY BLOOD GLUCOSE        Blood Gas Source Venous: Venous ( @ 17:19)     136  |  95<L>  |  25<H>  ----------------------------<  130<H>  4.4   |  27  |  1.14    Ca    9.8      2019 15:59  Phos  2.4       Mg     2.0         TPro  7.9  /  Alb  4.1  /  TBili  1.3<H>  /  DBili  x   /  AST  55<H>  /  ALT  32  /  AlkPhos  171<H>        PT/INR - ( 2019 15:42 )   PT: 37.4 sec;   INR: 3.17 ratio         PTT - ( 2019 15:42 )  PTT:39.1 sec              Urinalysis Basic - ( 2019 15:59 )    Color: Light Yellow / Appearance: Clear / S.012 / pH: x  Gluc: x / Ketone: Negative  / Bili: Negative / Urobili: Negative   Blood: x / Protein: 100 / Nitrite: Negative   Leuk Esterase: Negative / RBC: 10 /hpf / WBC 7 /HPF   Sq Epi: x / Non Sq Epi: 0 /hpf / Bacteria: Negative                              13.4   6.6   )-----------( 139      ( 2019 15:42 )             40.4     CAPILLARY BLOOD GLUCOSE        Blood Gas Source Venous: Venous ( @ 17:19)    CXR reviewed:  No cosolidations seen    CT reviewed:  No pna appreciated.    Rapid Respiratory Viral Panel (19 @ 15:42)    Rapid RVP Result: Detected: This Respiratory Panel uses polymerase chain reaction (PCR) to detect for  adenovirus; coronavirus (HKU1, NL63, 229E, OC43); human metapneumovirus  (hMPV); human enterovirus/rhinovirus (Entero/RV); influenza A; influenza  A/H1; influenza A/H3; influenza A/H1-2009; influenza B; parainfluenza  viruses 1, 2, 3, 4; respiratory syncytial virus; Mycoplasma pneumoniae;  and Chlamydophila pneumoniae.    Influenza A (RapRVP): Detected    Influenza AH3 (RapRVP): Detected     136  |  95<L>  |  25<H>  ----------------------------<  130<H>  4.4   |  27  |  1.14    Ca    9.8      2019 15:59  Phos  2.4       Mg     2.0         TPro  7.9  /  Alb  4.1  /  TBili  1.3<H>  /  DBili  x   /  AST  55<H>  /  ALT  32  /  AlkPhos  171<H>        PT/INR - ( 2019 15:42 )   PT: 37.4 sec;   INR: 3.17 ratio         PTT - ( 2019 15:42 )  PTT:39.1 sec              Urinalysis Basic - ( 2019 15:59 )    Color: Light Yellow / Appearance: Clear / S.012 / pH: x  Gluc: x / Ketone: Negative  / Bili: Negative / Urobili: Negative   Blood: x / Protein: 100 / Nitrite: Negative   Leuk Esterase: Negative / RBC: 10 /hpf / WBC 7 /HPF   Sq Epi: x / Non Sq Epi: 0 /hpf / Bacteria: Negative                              13.4   6.6   )-----------( 139      ( 2019 15:42 )             40.4     CAPILLARY BLOOD GLUCOSE        Blood Gas Source Venous: Venous ( @ 17:19)    CXR reviewed:  No cosolidations seen    CT reviewed:  No cosolidation    Rapid Respiratory Viral Panel (19 @ 15:42)    Rapid RVP Result: Detected: This Respiratory Panel uses polymerase chain reaction (PCR) to detect for  adenovirus; coronavirus (HKU1, NL63, 229E, OC43); human metapneumovirus  (hMPV); human enterovirus/rhinovirus (Entero/RV); influenza A; influenza  A/H1; influenza A/H3; influenza A/H1-2009; influenza B; parainfluenza  viruses 1, 2, 3, 4; respiratory syncytial virus; Mycoplasma pneumoniae;  and Chlamydophila pneumoniae.    Influenza A (RapRVP): Detected    Influenza AH3 (RapRVP): Detected     136  |  95<L>  |  25<H>  ----------------------------<  130<H>  4.4   |  27  |  1.14    Ca    9.8      2019 15:59  Phos  2.4       Mg     2.0         TPro  7.9  /  Alb  4.1  /  TBili  1.3<H>  /  DBili  x   /  AST  55<H>  /  ALT  32  /  AlkPhos  171<H>        PT/INR - ( 2019 15:42 )   PT: 37.4 sec;   INR: 3.17 ratio         PTT - ( 2019 15:42 )  PTT:39.1 sec              Urinalysis Basic - ( 2019 15:59 )    Color: Light Yellow / Appearance: Clear / S.012 / pH: x  Gluc: x / Ketone: Negative  / Bili: Negative / Urobili: Negative   Blood: x / Protein: 100 / Nitrite: Negative   Leuk Esterase: Negative / RBC: 10 /hpf / WBC 7 /HPF   Sq Epi: x / Non Sq Epi: 0 /hpf / Bacteria: Negative                              13.4   6.6   )-----------( 139      ( 2019 15:42 )             40.4     CAPILLARY BLOOD GLUCOSE        Blood Gas Source Venous: Venous ( @ 17:19)    CXR reviewed:  No cosolidations seen    CT reviewed:  No cosolidation    Rapid Respiratory Viral Panel (19 @ 15:42)    Rapid RVP Result: Detected: This Respiratory Panel uses polymerase chain reaction (PCR) to detect for  adenovirus; coronavirus (HKU1, NL63, 229E, OC43); human metapneumovirus  (hMPV); human enterovirus/rhinovirus (Entero/RV); influenza A; influenza  A/H1; influenza A/H3; influenza A/H1-2009; influenza B; parainfluenza  viruses 1, 2, 3, 4; respiratory syncytial virus; Mycoplasma pneumoniae;  and Chlamydophila pneumoniae.    Influenza A (RapRVP): Detected    Influenza AH3 (RapRVP): Detected    EKG reviewed: afib     136  |  95<L>  |  25<H>  ----------------------------<  130<H>  4.4   |  27  |  1.14    Ca    9.8      2019 15:59  Phos  2.4       Mg     2.0         TPro  7.9  /  Alb  4.1  /  TBili  1.3<H>  /  DBili  x   /  AST  55<H>  /  ALT  32  /  AlkPhos  171<H>        PT/INR - ( 2019 15:42 )   PT: 37.4 sec;   INR: 3.17 ratio         PTT - ( 2019 15:42 )  PTT:39.1 sec              Urinalysis Basic - ( 2019 15:59 )    Color: Light Yellow / Appearance: Clear / S.012 / pH: x  Gluc: x / Ketone: Negative  / Bili: Negative / Urobili: Negative   Blood: x / Protein: 100 / Nitrite: Negative   Leuk Esterase: Negative / RBC: 10 /hpf / WBC 7 /HPF   Sq Epi: x / Non Sq Epi: 0 /hpf / Bacteria: Negative                              13.4   6.6   )-----------( 139      ( 2019 15:42 )             40.4     CAPILLARY BLOOD GLUCOSE        Blood Gas Source Venous: Venous ( @ 17:19)    CXR reviewed:  No cosolidations seen    CT reviewed:  No cosolidation    Rapid Respiratory Viral Panel (19 @ 15:42)    Rapid RVP Result: Detected: This Respiratory Panel uses polymerase chain reaction (PCR) to detect for  adenovirus; coronavirus (HKU1, NL63, 229E, OC43); human metapneumovirus  (hMPV); human enterovirus/rhinovirus (Entero/RV); influenza A; influenza  A/H1; influenza A/H3; influenza A/H1-2009; influenza B; parainfluenza  viruses 1, 2, 3, 4; respiratory syncytial virus; Mycoplasma pneumoniae;  and Chlamydophila pneumoniae.    Influenza A (RapRVP): Detected    Influenza AH3 (RapRVP): Detected    EKG reviewed: afib    I personally reviewed & interpreted the lab findings above;  I personally reviewed & interpreted the radiographic findings;  I personally reviewed & interpreted the EKG findings;

## 2019-04-19 NOTE — H&P ADULT - NSHPREVIEWOFSYSTEMS_GEN_ALL_CORE
REVIEW OF SYSTEMS:    CONSTITUTIONAL: No weakness, fevers or chills  EYES/ENT: No visual changes;  No vertigo or throat pain   NECK: No pain or stiffness  RESPIRATORY: No hemoptysis; No shortness of breath  CARDIOVASCULAR: No chest pain or palpitations  GASTROINTESTINAL: No abdominal or epigastric pain. No nausea, vomiting, or hematemesis; No diarrhea or constipation. No melena or hematochezia.  GENITOURINARY: No dysuria, frequency or hematuria  NEUROLOGICAL: No numbness or weakness  SKIN: No itching, rashes  PSYCH:  No changes in mood REVIEW OF SYSTEMS:    CONSTITUTIONAL: No weakness, fevers  EYES/ENT: No visual changes;  No vertigo or throat pain   NECK: No pain or stiffness  RESPIRATORY: No hemoptysis; No shortness of breath  CARDIOVASCULAR: No chest pain or palpitations  GASTROINTESTINAL: No abdominal or epigastric pain. No nausea, vomiting, or hematemesis; No diarrhea or constipation. No melena or hematochezia.  GENITOURINARY: No dysuria, frequency or hematuria  NEUROLOGICAL: No numbness or weakness  SKIN: No itching, rashes  PSYCH:  No changes in mood REVIEW OF SYSTEMS:    CONSTITUTIONAL: +fevers, +generalized weakness.   EYES/ENT: No visual changes;  No vertigo or throat pain   NECK: No pain or stiffness  RESPIRATORY: No hemoptysis; No shortness of breath but has chest congestion and coughs   CARDIOVASCULAR: No chest pain or palpitations  GASTROINTESTINAL: No abdominal or epigastric pain. No nausea, vomiting, or hematemesis; No diarrhea or constipation. No melena or hematochezia.  GENITOURINARY: No dysuria, frequency or hematuria  MSK: no joint erythema or swelling. She reports chronic left leg edema.   NEUROLOGICAL: No numbness or syncope or falls.   SKIN: No itching, rashes  PSYCH:  No changes in mood, no anxiety

## 2019-04-19 NOTE — H&P ADULT - PROBLEM SELECTOR PLAN 3
Pt presenting with acute desaturations in setting of viral pna.  Now on O2.  Without O2, pt desats to 85%.    - c/w O2 and wean slowly  - will start duonebs q6  - chest pt  - start metanebs for airway clearance  - encourage coughing up phlegm. Pt presenting with fevers and tachypnea in the setting of flu positive, coronavirus positive and likely bacterial pneumonia which is indicative of sepsis.  - 1L fluids given in ED: will stop further IVF given faint crackles and enlarged heart on CXR.   - hold hydration given concern for fluid overload on exam, last echo did show nl LVEF in office, however given the impressive cardiac sillohoute would check TTE to assess for change .  - f/u bcx  - f/u Urine Cultures -c/w tamiflu but renally dose medication.

## 2019-04-19 NOTE — H&P ADULT - PROBLEM SELECTOR PLAN 9
Pt already therapeutically anticoagulated  DASH diet    Rebeka Dempsey MD, PhD  PGY-2| Internal Medicine  [phone] / [phone] Discussed DNR/DNI with patient.  Patient expresses that she would like a trial of treatments but would not want to be "a vegetable".  She names her son, Kush Eric, as her HCP.  Phone is [phone] -Total time of 30 minutes spent discussing advance care planning. Patient who has full decision making capacity and insight into her disease process reports she would not want to be in a vegetative state if something happens. We discussed measures such as CPR and/or intubation and she would like a trial of these measures but if the outcome was that she would be in a  prolonged vegetative state she would not want these measures. Son, Girma is proxy and he reports he supports this decision. Patient is Full Code.

## 2019-04-19 NOTE — H&P ADULT - PROBLEM SELECTOR PLAN 5
Pt with afib on coumadin, therapeutically anticoagulated.  - monitor INR and restart warfarin when INR < 2.5  - c/w metop 25 mg BID Pt with afib on coumadin, therapeutically anticoagulated.  - monitor INR and restart warfarin when INR < 2.5  - will hold metop in this setting, if RVR, will start. Pt with afib on coumadin, therapeutically anticoagulated.  - monitor INR for goal of 2-3.   - will hold metop in this setting given bronchospasm. Resume when respiratory status improves. -Total tim mildly elevated along with AST. However she has no findings to support acute cholecystitis or acute cholangitis. Can consider abdominal RUQ u/s outpatient unless clinically there is a change while in hospital or her LFT uptrends.  -If this is from statin therapy and she continues to have uptrending enzymes then one consideration is statin hepatotoxicity. In which case we will have to d.c the statin. For now will continue to monitor as the elevation is very minor.

## 2019-04-19 NOTE — H&P ADULT - ATTENDING COMMENTS
Patient assigned to me by night hospitalist in charge for management and care for patient for this evening only. Care to be resumed by day hospitalist in the morning and thereafter.     Patient seen and examined at bedside. Agree with the resident note above. Changes made to note above where appropriate. Agree with the advance care planning discussion above. Patient assigned to me by night hospitalist in charge for management and care for patient for this evening only. Care to be resumed by day hospitalist in the morning and thereafter.     Patient seen and examined at bedside. Agree with the resident note above. Changes made to note above where appropriate. Agree with the advance care planning discussion above. I updated son with the his mom's clinical findings. I also discussed advance life care planning myself. I explained to him that his mom's prognosis is guarded and she has potential to decompensate further including respiratory failure. He understands and questions answered to his satisification. Currently patient does not need ICU care. Patient assigned to me by night hospitalist in charge for management and care for patient for this evening only. Care to be resumed by day hospitalist in the morning and thereafter.     Patient seen and examined at bedside. Agree with the resident note above. Changes made to note above where appropriate. Patient does have left leg edema which is greater than right leg. Assuming she is therapeutically anticoagulated--always--- then it is unlikely to be DVT unless she has true warfarin failure. However, given that INR values can fluctuate and don't have records of her outpatient INRs, it is best to get doppler of left leg to r/o DVT. Also would recommend TTE for heart function assessment. Agree with the advance care planning discussion above. I updated son with the his mom's clinical findings. I also discussed advance life care planning myself. I explained to him that his mom's prognosis is guarded and she has potential to decompensate further including respiratory failure. He understands and questions answered to his satisification. Currently patient does not need ICU care.

## 2019-04-19 NOTE — H&P ADULT - NSICDXPASTSURGICALHX_GEN_ALL_CORE_FT
PAST SURGICAL HISTORY:  H/O dilation and curettage     History of right hip replacement     Polyp of Ureter removed many years ago    S/P Tonsillectomy as child

## 2019-04-19 NOTE — H&P ADULT - PROBLEM SELECTOR PLAN 10
Pt already therapeutically anticoagulated  DASH diet    Rebeka Dempsey MD, PhD  PGY-2| Internal Medicine  [phone] / [phone]

## 2019-04-19 NOTE — H&P ADULT - PROBLEM SELECTOR PLAN 2
Pt presenting with fevers and tachypnea in the setting of viral pna, indicative of sepsis.  - 1L fluids given in ED.    - gentle hydration, last echo did show nl LVEF in office, however pt with increased fluid and history of class II pulm htn, will monitor for fluid overload.  - f/u bcx  - f/u Urine Cultures Pt presenting with fevers and tachypnea in the setting of viral pna, indicative of sepsis.  - 1L fluids given in ED: will stop.  - hold hydration given concern for fluid overload on exam, last echo did show nl LVEF in office, however pt with increased fluid and history of class II pulm htn, will monitor for fluid overload.  - f/u bcx  - f/u Urine Cultures -Patient's hx c.w bacterial pneumonia but RVP is positive for flu and coronavirus. given her clinical presentation, hypoxia, and advanced age despite CT chest read, patient would benefit from antimicrobial therapy. Her CT chest on actual imaging review does show bibasilar opacities even though read by radiologist is "no pneumonia." Would c.w IV azithromycin and ceftriaxone and follow up culture results and urine legionella. C/w duoneb therapy. At this point less suspicious for MRSA pneumonia.

## 2019-04-19 NOTE — CHART NOTE - NSCHARTNOTEFT_GEN_A_CORE
Sepsis Note      TO BE COMPLETED WITHIN 6 HOURS OF INITIAL ASSESSMENT:    For use in patients that have 2 sepsis criteria and new organ dysfunction   •	New or increased oxygen requirement  •	Creatinine >2mg/dL  •	Bilirubin>2mg/dL  •	Platelet <100,00/mm3  •	INR >1.5, PTT>60  •	Lactate >2    If patient persistent hypotension (SBP<90) or any lactate >4 then provider evaluation (Physician/PA/NP) within 30 minutes of bolus completion is required.    Vital Signs Last 24 Hrs  T(C): 37.6 (19 Apr 2019 15:48), Max: 39.1 (19 Apr 2019 14:41)  T(F): 99.6 (19 Apr 2019 15:48), Max: 102.3 (19 Apr 2019 14:41)  HR: 90 (19 Apr 2019 15:48) (90 - 90)  BP: 179/69 (19 Apr 2019 15:48) (179/69 - 182/74)  BP(mean): --  RR: 20 (19 Apr 2019 15:48) (16 - 24)  SpO2: 95% (19 Apr 2019 15:48) (82% - 95%)  		  LUNGS:  [ x ]Clear bilaterally [  ] Wheeze [  ] Rhonchi [  ] Rales [  ] Crackles; Other:  HEART: [ x ]RRR [  ] No murmur[  ]  Normal S1S2[  ] Tachycardia;  Other:  CAPILLARY REFULL:  	Fingers: [ x ] less than 2 seconds [  ] more than 2 seconds                                           Toes: [  ]  less than 2 seconds [  ] more than 2 seconds   PERIPHERAL PULSES:  Radial: [  x] Palpable  [  ]  non-palpable                                         Dorsalis Pedis: [  ] Palpable  [  ] non-palpable                                         Posterior Tibial: [  ] Palpable  [  ] non-palpable                                          Other:  SKIN:   [  ]  Diaphoretic  [  ]  mottling  [  ]  Cold extremities  [  x]  Warm [  ]  Dry                      Other:    BEDSIDE ULTRASOUND FINDINGS (IF APPLICABLE):    Labs:  19 Apr 2019 15:59    136    |  95     |  25     ----------------------------<  130    4.4     |  27     |  1.14     Ca    9.8        19 Apr 2019 15:59  Phos  2.4       19 Apr 2019 15:59  Mg     2.0       19 Apr 2019 15:59    TPro  7.9    /  Alb  4.1    /  TBili  1.3    /  DBili  x      /  AST  55     /  ALT  32     /  AlkPhos  171    19 Apr 2019 15:59                          13.4   6.6   )-----------( 139      ( 19 Apr 2019 15:42 )             40.4     PT/INR - ( 19 Apr 2019 15:42 )   PT: 37.4 sec;   INR: 3.17 ratio         PTT - ( 19 Apr 2019 15:42 )  PTT:39.1 sec  Lactate:    Plan (orders must be placed in EMR):     [  x]  Check Repeat Lactate (initial, ordered).  [  ]  No change in current plan  [  ]  Start Vasopressors:  [  ]  Repeat Fluid Bolus:  [  ] other:    Care Discussed with Consultants/Other Providers [ ] YES  [ ] NO

## 2019-04-19 NOTE — ED ADULT NURSE NOTE - OBJECTIVE STATEMENT
Patient presents to Ed with c/o weakness, Patient reports experiencing sneezing sore throat and copious amounts of  phlegm secretions for three days in which she was sent by md for pneumonia. Patient A&Ox3 weak, reports she is able  to ambulate, denies chest pain is sob on exertion, no nausea vomiting diarrhea +fever no chills headache or dizziness.  Lungs bilat crackles noted, abd soft non tender, skin warm dry intact. RFA 20g iv lock placed labs drawn and sent.

## 2019-04-20 ENCOUNTER — TRANSCRIPTION ENCOUNTER (OUTPATIENT)
Age: 84
End: 2019-04-20

## 2019-04-20 LAB
ANION GAP SERPL CALC-SCNC: 14 MMOL/L — SIGNIFICANT CHANGE UP (ref 5–17)
APTT BLD: 40.6 SEC — HIGH (ref 27.5–36.3)
BASOPHILS # BLD AUTO: 0 K/UL — SIGNIFICANT CHANGE UP (ref 0–0.2)
BASOPHILS NFR BLD AUTO: 0.2 % — SIGNIFICANT CHANGE UP (ref 0–2)
BLD GP AB SCN SERPL QL: NEGATIVE — SIGNIFICANT CHANGE UP
BUN SERPL-MCNC: 23 MG/DL — SIGNIFICANT CHANGE UP (ref 7–23)
CALCIUM SERPL-MCNC: 9.3 MG/DL — SIGNIFICANT CHANGE UP (ref 8.4–10.5)
CHLORIDE SERPL-SCNC: 99 MMOL/L — SIGNIFICANT CHANGE UP (ref 96–108)
CO2 SERPL-SCNC: 24 MMOL/L — SIGNIFICANT CHANGE UP (ref 22–31)
CREAT SERPL-MCNC: 1.07 MG/DL — SIGNIFICANT CHANGE UP (ref 0.5–1.3)
CULTURE RESULTS: SIGNIFICANT CHANGE UP
EOSINOPHIL # BLD AUTO: 0.2 K/UL — SIGNIFICANT CHANGE UP (ref 0–0.5)
EOSINOPHIL NFR BLD AUTO: 2.7 % — SIGNIFICANT CHANGE UP (ref 0–6)
GLUCOSE SERPL-MCNC: 116 MG/DL — HIGH (ref 70–99)
GRAM STN FLD: SIGNIFICANT CHANGE UP
HCT VFR BLD CALC: 42.8 % — SIGNIFICANT CHANGE UP (ref 34.5–45)
HGB BLD-MCNC: 13.5 G/DL — SIGNIFICANT CHANGE UP (ref 11.5–15.5)
INR BLD: 3.35 RATIO — HIGH (ref 0.88–1.16)
LEGIONELLA AG UR QL: NEGATIVE — SIGNIFICANT CHANGE UP
LYMPHOCYTES # BLD AUTO: 1.1 K/UL — SIGNIFICANT CHANGE UP (ref 1–3.3)
LYMPHOCYTES # BLD AUTO: 19.2 % — SIGNIFICANT CHANGE UP (ref 13–44)
MAGNESIUM SERPL-MCNC: 2 MG/DL — SIGNIFICANT CHANGE UP (ref 1.6–2.6)
MCHC RBC-ENTMCNC: 31.5 GM/DL — LOW (ref 32–36)
MCHC RBC-ENTMCNC: 32.7 PG — SIGNIFICANT CHANGE UP (ref 27–34)
MCV RBC AUTO: 104 FL — HIGH (ref 80–100)
MONOCYTES # BLD AUTO: 0.9 K/UL — SIGNIFICANT CHANGE UP (ref 0–0.9)
MONOCYTES NFR BLD AUTO: 14.6 % — HIGH (ref 2–14)
NEUTROPHILS # BLD AUTO: 3.8 K/UL — SIGNIFICANT CHANGE UP (ref 1.8–7.4)
NEUTROPHILS NFR BLD AUTO: 63.4 % — SIGNIFICANT CHANGE UP (ref 43–77)
PHOSPHATE SERPL-MCNC: 3.3 MG/DL — SIGNIFICANT CHANGE UP (ref 2.5–4.5)
PLATELET # BLD AUTO: 122 K/UL — LOW (ref 150–400)
POTASSIUM SERPL-MCNC: 4.3 MMOL/L — SIGNIFICANT CHANGE UP (ref 3.5–5.3)
POTASSIUM SERPL-SCNC: 4.3 MMOL/L — SIGNIFICANT CHANGE UP (ref 3.5–5.3)
PROTHROM AB SERPL-ACNC: 39.7 SEC — HIGH (ref 10–12.9)
RBC # BLD: 4.13 M/UL — SIGNIFICANT CHANGE UP (ref 3.8–5.2)
RBC # FLD: 13.3 % — SIGNIFICANT CHANGE UP (ref 10.3–14.5)
RH IG SCN BLD-IMP: POSITIVE — SIGNIFICANT CHANGE UP
SODIUM SERPL-SCNC: 137 MMOL/L — SIGNIFICANT CHANGE UP (ref 135–145)
SPECIMEN SOURCE: SIGNIFICANT CHANGE UP
SPECIMEN SOURCE: SIGNIFICANT CHANGE UP
WBC # BLD: 6 K/UL — SIGNIFICANT CHANGE UP (ref 3.8–10.5)
WBC # FLD AUTO: 6 K/UL — SIGNIFICANT CHANGE UP (ref 3.8–10.5)

## 2019-04-20 PROCEDURE — 99233 SBSQ HOSP IP/OBS HIGH 50: CPT | Mod: GC

## 2019-04-20 PROCEDURE — 93971 EXTREMITY STUDY: CPT | Mod: 26

## 2019-04-20 RX ORDER — LOSARTAN POTASSIUM 100 MG/1
25 TABLET, FILM COATED ORAL DAILY
Qty: 0 | Refills: 0 | Status: DISCONTINUED | OUTPATIENT
Start: 2019-04-20 | End: 2019-04-20

## 2019-04-20 RX ORDER — LOSARTAN POTASSIUM 100 MG/1
25 TABLET, FILM COATED ORAL DAILY
Qty: 0 | Refills: 0 | Status: DISCONTINUED | OUTPATIENT
Start: 2019-04-20 | End: 2019-04-24

## 2019-04-20 RX ORDER — ACETAMINOPHEN 500 MG
650 TABLET ORAL EVERY 6 HOURS
Qty: 0 | Refills: 0 | Status: DISCONTINUED | OUTPATIENT
Start: 2019-04-20 | End: 2019-04-24

## 2019-04-20 RX ORDER — LOSARTAN POTASSIUM 100 MG/1
50 TABLET, FILM COATED ORAL DAILY
Qty: 0 | Refills: 0 | Status: DISCONTINUED | OUTPATIENT
Start: 2019-04-20 | End: 2019-04-20

## 2019-04-20 RX ORDER — WARFARIN SODIUM 2.5 MG/1
1 TABLET ORAL AT BEDTIME
Qty: 0 | Refills: 0 | Status: DISCONTINUED | OUTPATIENT
Start: 2019-04-20 | End: 2019-04-20

## 2019-04-20 RX ADMIN — Medication 30 MILLIGRAM(S): at 12:45

## 2019-04-20 RX ADMIN — Medication 3 MILLILITER(S): at 17:22

## 2019-04-20 RX ADMIN — Medication 1 TABLET(S): at 12:45

## 2019-04-20 RX ADMIN — Medication 200 MILLIGRAM(S): at 22:01

## 2019-04-20 RX ADMIN — Medication 3 MILLILITER(S): at 11:11

## 2019-04-20 RX ADMIN — ATORVASTATIN CALCIUM 10 MILLIGRAM(S): 80 TABLET, FILM COATED ORAL at 22:01

## 2019-04-20 RX ADMIN — LOSARTAN POTASSIUM 25 MILLIGRAM(S): 100 TABLET, FILM COATED ORAL at 06:18

## 2019-04-20 RX ADMIN — Medication 3 MILLILITER(S): at 06:09

## 2019-04-20 RX ADMIN — Medication 100 MILLIGRAM(S): at 22:01

## 2019-04-20 RX ADMIN — AZITHROMYCIN 250 MILLIGRAM(S): 500 TABLET, FILM COATED ORAL at 17:15

## 2019-04-20 RX ADMIN — CEFTRIAXONE 100 GRAM(S): 500 INJECTION, POWDER, FOR SOLUTION INTRAMUSCULAR; INTRAVENOUS at 22:01

## 2019-04-20 RX ADMIN — Medication 3 MILLILITER(S): at 23:11

## 2019-04-20 NOTE — PROGRESS NOTE ADULT - PROBLEM SELECTOR PLAN 4
Pt presenting with fevers and tachypnea in the setting of flu positive, coronavirus positive and likely bacterial pneumonia which is indicative of sepsis. Lactate 2.6 on admission  - 1L fluids given in ED: will stop further IVF given faint crackles and enlarged heart on CXR.   - hold hydration given concern for fluid overload on exam, last echo did show nl LVEF in office, however given the impressive cardiac sillohoute will check TTE to assess for change .  - f/u bcx  - f/u Urine Cultures

## 2019-04-20 NOTE — PROGRESS NOTE ADULT - PROBLEM SELECTOR PLAN 2
-Patient's hx c.w bacterial pneumonia but RVP is positive for flu and coronavirus. given her clinical presentation, hypoxia, and advanced age despite CT chest read, patient would benefit from antimicrobial therapy. Her CT chest on actual imaging review does show bibasilar opacities even though read by radiologist is "no pneumonia." Would c.w IV azithromycin and ceftriaxone and follow up culture results and urine legionella. C/w duoneb therapy. At this point less suspicious for MRSA pneumonia.

## 2019-04-20 NOTE — DISCHARGE NOTE PROVIDER - NSDCCPCAREPLAN_GEN_ALL_CORE_FT
PRINCIPAL DISCHARGE DIAGNOSIS  Diagnosis: Acute respiratory failure with hypoxia  Assessment and Plan of Treatment: You came to the hospital with fever, cough, and were found to have low oxygen numbers. Nasal swab showed influzena A and coronavirus infection.  Scan of you chest did not show signs of bacterial pneumonia and antibiotics were stopped. As you improved you were able to come off of oxygen. Physical therapy evaluated you and you prefered to be discharge home with physical therapy and home health aides. Please followup with you primary care physician to ensure resolution of your symptoms.      SECONDARY DISCHARGE DIAGNOSES  Diagnosis: Supratherapeutic INR  Assessment and Plan of Treatment: You came to the hospital with a mildy high INR (3.17) so your coumadin was held. You recieved your regular coumadin dose on 4/23 and your INR again became elevated (3.21). Please hold your coumadin tonight and restart it tomorrow (4/25). Please followup with your primary doctor to have your INR rechecked.

## 2019-04-20 NOTE — PROGRESS NOTE ADULT - PROBLEM SELECTOR PLAN 7
holding toresemide in setting of sepsis.  - Restarted losartan tomorrow if BP stable overnight. holding toresemide in setting of sepsis.  - Restarted losartan tomorrow if BP stable overnight.    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  - BNP 6406  - Normal LVEF as per cardiology documentation: Mitral valve prolapse with Moderate mitral regurgitation

## 2019-04-20 NOTE — DISCHARGE NOTE PROVIDER - HOSPITAL COURSE
Ms. Eric is a 89y/o F w/ PMH AF (on coumadin), HTN, HLD p/w productive cough x 4 days and admitted for hypoxic respiratory failure.         Upon arrival, pt was febrile to 102.3F and had desaturation to 82%.  She was placed on NC.  She was given 1L fluids, ceftriaxone and azithro and admitted for further care.        On admission pt was started on duonebs, Chest PT, airway clearance device and incentive spirometer. Renally dosed tamiflu was added for +flu and +coronavirus on RVP. CT Chest showed atelectasis and bronchiectasis, however antibiotics were continued given clinical concern for pneumonia. Ms. Eric is a 89y/o F w/ PMH AF (on coumadin), HTN, HLD p/w productive cough x 4 days and admitted for hypoxic respiratory failure.         Upon arrival, pt was febrile to 102.3F and had desaturation to 82%.  She was placed on NC.  She was given 1L fluids, ceftriaxone and azithro and admitted for further care.        On admission pt was started on duonebs, Chest PT, airway clearance device and incentive spirometer. Renally dosed tamiflu was added for +flu and +coronavirus on RVP. CT Chest showed atelectasis and bronchiectasis, however antibiotics were continued initially given clinical concern for pneumonia. Pt remained hemodynamically stable without leukocytosis of fever, so antibiotics were discontinued. Pt was evaluated by PT who recommended subacute rehab, however pt prefers discharge home with home PT.        Pt is currently stable for discharge to home with home PT and health aides. Ms. Eric is a 89y/o F w/ PMH AF (on coumadin), HTN, HLD p/w productive cough x 4 days and admitted for hypoxic respiratory failure.         Upon arrival, pt was febrile to 102.3F and had desaturation to 82%.  She was placed on NC.  She was given 1L fluids, ceftriaxone and azithro and admitted for further care.        On admission pt was started on duonebs, Chest PT, airway clearance device and incentive spirometer. Renally dosed tamiflu was added for +flu and +coronavirus on RVP. CT Chest showed atelectasis and bronchiectasis, however antibiotics were continued initially given clinical concern for pneumonia. Pt remained hemodynamically stable without leukocytosis of fever, so antibiotics were discontinued. Pt was evaluated by PT who recommended subacute rehab, however pt prefers discharge home with home PT.        Pt is currently stable for discharge to home with home PT and health aides. Pt to followup with PCP for warfarin management.

## 2019-04-20 NOTE — PROGRESS NOTE ADULT - SUBJECTIVE AND OBJECTIVE BOX
Authored by Dr John Stephens [phone] [facility] / [id number] [facility]    Patient is a 89y old  Female who presents with a chief complaint of Cough (2019 20:34)    SUBJECTIVE / OVERNIGHT EVENTS:    89F PMH of AF (on coumadin), HTN, HLD p/w productive cough x 4 days.  Pt states last week, her daughter-in-law was sick with cold-like symptoms.  Pt visited with her son and daughter in law intermittently in the last week and states for the last 4 days she has been having a "tickle in throat", running nose and chills.  Pt states she thought she had been catching a cold and thought it would get better.  She said hot tea made her feel better, nothing made it worse.  Because she was not getting better and feeling worse, pt went to PMD today for further care.  In the office of PMD, pt was found to be febrile to 102 and was sent to ED for further care.    Upon arrival, pt was febrile to 102.3F and had desaturation to 82%.  She was placed on NC.  She was given 1L fluids, ceftriaxone and azithro and admitted for further care.    Pt denied any associated nausea, vomiting, headaches, sob with the symptoms she has been having.  She denies fevers at home.  Pt lives home alone and is independent of ADLs and IADLs.  Her son lives nearby.    Overnight pt became hypertensive and was restarted on her losartan     MEDICATIONS  (STANDING):  ALBUTerol/ipratropium for Nebulization 3 milliLiter(s) Nebulizer every 6 hours  atorvastatin 10 milliGRAM(s) Oral at bedtime  azithromycin  IVPB 500 milliGRAM(s) IV Intermittent every 24 hours  calcium carbonate   1250 mG (OsCal) 1 Tablet(s) Oral daily  cefTRIAXone   IVPB 1 Gram(s) IV Intermittent every 24 hours  losartan 25 milliGRAM(s) Oral daily  multivitamin 1 Tablet(s) Oral daily  oseltamivir 30 milliGRAM(s) Oral daily    MEDICATIONS  (PRN):  artificial  tears Solution 1 Drop(s) Both EYES two times a day PRN Dry Eyes      Vital Signs Last 24 Hrs  T(C): 36.4 (2019 05:42), Max: 39.1 (2019 14:41)  T(F): 97.6 (2019 05:42), Max: 102.3 (2019 14:41)  HR: 79 (2019 06:09) (71 - 90)  BP: 179/94 (2019 05:42) (105/65 - 193/91)  BP(mean): --  RR: 22 (2019 05:42) (16 - 27)  SpO2: 92% (2019 06:09) (82% - 96%)  CAPILLARY BLOOD GLUCOSE        I&O's Summary    2019 07:01  -  2019 07:00  --------------------------------------------------------  IN: 1000 mL / OUT: 0 mL / NET: 1000 mL        PHYSICAL EXAM  General: Sitting in bed, NAD, awake and alert.   HEENT:   Normal oral mucosa, PERRL, EOMI, anicteric sclera; NC in place  Lymphatic: No lymphadenopathy in cervical or axillary nodes  Cardiovascular: RRR, No murmurs, gallops or rubs appreciated; left leg edema > right leg  Respiratory: Lungs with expiratory wheezes, crackles at bases; diminished breath sounds at bases  Gastrointestinal:  Soft, nondistended, nontender, +BS	  Skin: No overt rash.   Neurologic: AOx3, CNII-XII grossly intact, motor and sensory function grossly intact  Extremities: Moving all extremities equally in ROM and strength.   MSK: no joint erythema or swelling   Vascular: palpable dp, pt and radial pulses 2+ bilaterally  	2+ edema noted bilaterally  Psych:  Normal mood and affect, responds to questions appropriately    LABS:                        13.5   6.0   )-----------( 122      ( 2019 06:52 )             42.8     04-20    137  |  99  |  23  ----------------------------<  116<H>  4.3   |  24  |  1.07    Ca    9.3      2019 06:51  Phos  3.3     04-20  Mg     2.0     04-20    TPro  7.9  /  Alb  4.1  /  TBili  1.3<H>  /  DBili  x   /  AST  55<H>  /  ALT  32  /  AlkPhos  171<H>  04-19    PT/INR - ( 2019 06:52 )   PT: 39.7 sec;   INR: 3.35 ratio         PTT - ( 2019 06:52 )  PTT:40.6 sec      Urinalysis Basic - ( 2019 15:59 )    Color: Light Yellow / Appearance: Clear / S.012 / pH: x  Gluc: x / Ketone: Negative  / Bili: Negative / Urobili: Negative   Blood: x / Protein: 100 / Nitrite: Negative   Leuk Esterase: Negative / RBC: 10 /hpf / WBC 7 /HPF   Sq Epi: x / Non Sq Epi: 0 /hpf / Bacteria: Negative    < from: CT Chest No Cont (19 @ 17:06) >  CHEST:     LUNGS AND LARGE AIRWAYS: Patent central airways. Motion artifact.   Bilateral mosaic attenuation due to air trapping. Bilateral apical   scarring/thickening with adjacent bronchiectasis. A few tiny scattered   calcified granulomas. Bilateral lower lobe subsegmental atelectasis.  PLEURA: No pleural effusion.  VESSELS: Atherosclerotic changes of the thoracic aorta. No thoracic   aneurysm. An 8 mm calcified splenic artery aneurysm.  HEART: Cardiomegaly with left atrial enlargement. Trace pericardial   effusion. Calcification involving the aortic valve and coronary arteries.  MEDIASTINUM AND VALERIE: No lymphadenopathy.  CHEST WALL AND LOWER NECK: Within normal limits.  VISUALIZED UPPER ABDOMEN: Within normal limits.  BONES: Bone demineralization. Thoracic dextroscoliosis with degenerative   changes of the spine.    IMPRESSION: No pneumonia.    < end of copied text >    < from: Xray Chest 1 View- PORTABLE-Urgent (19 @ 15:37) >  The heart is slightly enlarged. The lungs are clear. Calcified aortic   knob. No pneumothorax. No pleural effusions.     < end of copied text >      RADIOLOGY & ADDITIONAL TESTS:    Imaging Personally Reviewed:  Consultant(s) Notes Reviewed:    Care Discussed with Consultants/Other Providers: Authored by Dr John Stephens [phone] [facility] / [id number] [facility]    Patient is a 89y old  Female who presents with a chief complaint of Cough (2019 20:34)    SUBJECTIVE / OVERNIGHT EVENTS:    89F PMH of AF (on coumadin), HTN, HLD p/w productive cough x 4 days.  Pt states last week, her daughter-in-law was sick with cold-like symptoms.  Pt visited with her son and daughter in law intermittently in the last week and states for the last 4 days she has been having a "tickle in throat", running nose and chills.  Pt states she thought she had been catching a cold and thought it would get better.  She said hot tea made her feel better, nothing made it worse.  Because she was not getting better and feeling worse, pt went to PMD today for further care.  In the office of PMD, pt was found to be febrile to 102 and was sent to ED for further care.    Upon arrival, pt was febrile to 102.3F and had desaturation to 82%.  She was placed on NC.  She was given 1L fluids, ceftriaxone and azithro and admitted for further care.    Pt denied any associated nausea, vomiting, headaches, sob with the symptoms she has been having.  She denies fevers at home.  Pt lives home alone and is independent of ADLs and IADLs.  Her son lives nearby.    Overnight pt became hypertensive and was restarted on her losartan     This morning pt is awake and conversive on 6L NC. She is coughing and endorses yellow sputum production.     MEDICATIONS  (STANDING):  ALBUTerol/ipratropium for Nebulization 3 milliLiter(s) Nebulizer every 6 hours  atorvastatin 10 milliGRAM(s) Oral at bedtime  azithromycin  IVPB 500 milliGRAM(s) IV Intermittent every 24 hours  calcium carbonate   1250 mG (OsCal) 1 Tablet(s) Oral daily  cefTRIAXone   IVPB 1 Gram(s) IV Intermittent every 24 hours  losartan 25 milliGRAM(s) Oral daily  multivitamin 1 Tablet(s) Oral daily  oseltamivir 30 milliGRAM(s) Oral daily    MEDICATIONS  (PRN):  artificial  tears Solution 1 Drop(s) Both EYES two times a day PRN Dry Eyes      Vital Signs Last 24 Hrs  T(C): 36.4 (2019 05:42), Max: 39.1 (2019 14:41)  T(F): 97.6 (2019 05:42), Max: 102.3 (2019 14:41)  HR: 79 (2019 06:09) (71 - 90)  BP: 179/94 (2019 05:42) (105/65 - 193/91)  BP(mean): --  RR: 22 (2019 05:42) (16 - 27)  SpO2: 92% (2019 06:09) (82% - 96%)  CAPILLARY BLOOD GLUCOSE        I&O's Summary    2019 07:01  -  2019 07:00  --------------------------------------------------------  IN: 1000 mL / OUT: 0 mL / NET: 1000 mL        PHYSICAL EXAM  General: Sitting in bed, NAD, awake and alert.   HEENT:   Normal oral mucosa, PERRL, EOMI, anicteric sclera; NC in place  Lymphatic: No lymphadenopathy in cervical or axillary nodes  Cardiovascular: RRR, No murmurs, gallops or rubs appreciated; trace left leg edema > right leg  Respiratory: Lungs with expiratory wheezes, crackles at bases; diminished breath sounds at bases  Gastrointestinal:  Soft, nondistended, nontender, +BS	  Skin: No overt rash.   Neurologic: AOx3,  motor and sensory function grossly intact  Extremities: Moving all extremities equally in ROM and strength.   MSK: no joint erythema or swelling   Vascular: palpable dp, pt and radial pulses 2+ bilaterally  	2+ edema noted bilaterally  Psych:  Normal mood and affect, responds to questions appropriately    LABS:                        13.5   6.0   )-----------( 122      ( 2019 06:52 )             42.8     04-20    137  |  99  |  23  ----------------------------<  116<H>  4.3   |  24  |  1.07    Ca    9.3      2019 06:51  Phos  3.3     04-20  Mg     2.0     04-20    TPro  7.9  /  Alb  4.1  /  TBili  1.3<H>  /  DBili  x   /  AST  55<H>  /  ALT  32  /  AlkPhos  171<H>      PT/INR - ( 2019 06:52 )   PT: 39.7 sec;   INR: 3.35 ratio         PTT - ( 2019 06:52 )  PTT:40.6 sec      Urinalysis Basic - ( 2019 15:59 )    Color: Light Yellow / Appearance: Clear / S.012 / pH: x  Gluc: x / Ketone: Negative  / Bili: Negative / Urobili: Negative   Blood: x / Protein: 100 / Nitrite: Negative   Leuk Esterase: Negative / RBC: 10 /hpf / WBC 7 /HPF   Sq Epi: x / Non Sq Epi: 0 /hpf / Bacteria: Negative    < from: CT Chest No Cont (19 @ 17:06) >  CHEST:     LUNGS AND LARGE AIRWAYS: Patent central airways. Motion artifact.   Bilateral mosaic attenuation due to air trapping. Bilateral apical   scarring/thickening with adjacent bronchiectasis. A few tiny scattered   calcified granulomas. Bilateral lower lobe subsegmental atelectasis.  PLEURA: No pleural effusion.  VESSELS: Atherosclerotic changes of the thoracic aorta. No thoracic   aneurysm. An 8 mm calcified splenic artery aneurysm.  HEART: Cardiomegaly with left atrial enlargement. Trace pericardial   effusion. Calcification involving the aortic valve and coronary arteries.  MEDIASTINUM AND VALERIE: No lymphadenopathy.  CHEST WALL AND LOWER NECK: Within normal limits.  VISUALIZED UPPER ABDOMEN: Within normal limits.  BONES: Bone demineralization. Thoracic dextroscoliosis with degenerative   changes of the spine.    IMPRESSION: No pneumonia.    < end of copied text >    < from: Xray Chest 1 View- PORTABLE-Urgent (19 @ 15:37) >  The heart is slightly enlarged. The lungs are clear. Calcified aortic   knob. No pneumothorax. No pleural effusions.     < end of copied text >      RADIOLOGY & ADDITIONAL TESTS:    Imaging Personally Reviewed: Chest CT  Consultant(s) Notes Reviewed:    Care Discussed with Consultants/Other Providers: Authored by Dr John Stephens [phone] [facility] / [id number] [facility]    Patient is a 89y old  Female who presents with a chief complaint of Cough (2019 20:34)    SUBJECTIVE / OVERNIGHT EVENTS:    89F PMH of AF (on coumadin), HTN, HLD p/w productive cough x 4 days.  Pt states last week, her daughter-in-law was sick with cold-like symptoms.  Pt visited with her son and daughter in law intermittently in the last week and states for the last 4 days she has been having a "tickle in throat", running nose and chills.  Pt states she thought she had been catching a cold and thought it would get better.  She said hot tea made her feel better, nothing made it worse.  Because she was not getting better and feeling worse, pt went to PMD today for further care.  In the office of PMD, pt was found to be febrile to 102 and was sent to ED for further care.    Upon arrival, pt was febrile to 102.3F and had desaturation to 82%.  She was placed on NC.  She was given 1L fluids, ceftriaxone and azithro and admitted for further care.    Pt denied any associated nausea, vomiting, headaches, sob with the symptoms she has been having.  She denies fevers at home.  Pt lives home alone and is independent of ADLs and IADLs.  Her son lives nearby.    Overnight pt became hypertensive and was restarted on her losartan     This morning pt is awake and conversive on 6L NC. She is coughing and endorses yellow sputum production.     MEDICATIONS  (STANDING):  ALBUTerol/ipratropium for Nebulization 3 milliLiter(s) Nebulizer every 6 hours  atorvastatin 10 milliGRAM(s) Oral at bedtime  azithromycin  IVPB 500 milliGRAM(s) IV Intermittent every 24 hours  calcium carbonate   1250 mG (OsCal) 1 Tablet(s) Oral daily  cefTRIAXone   IVPB 1 Gram(s) IV Intermittent every 24 hours  losartan 25 milliGRAM(s) Oral daily  multivitamin 1 Tablet(s) Oral daily  oseltamivir 30 milliGRAM(s) Oral daily    MEDICATIONS  (PRN):  artificial  tears Solution 1 Drop(s) Both EYES two times a day PRN Dry Eyes      Vital Signs Last 24 Hrs  T(C): 36.4 (2019 05:42), Max: 39.1 (2019 14:41)  T(F): 97.6 (2019 05:42), Max: 102.3 (2019 14:41)  HR: 79 (2019 06:09) (71 - 90)  BP: 179/94 (2019 05:42) (105/65 - 193/91)  BP(mean): --  RR: 22 (2019 05:42) (16 - 27)  SpO2: 92% (2019 06:09) (82% - 96%)  CAPILLARY BLOOD GLUCOSE        I&O's Summary    2019 07:01  -  2019 07:00  --------------------------------------------------------  IN: 1000 mL / OUT: 0 mL / NET: 1000 mL        PHYSICAL EXAM  General: Sitting in bed, NAD, awake and alert.   HEENT:   Normal oral mucosa, PERRL, EOMI, anicteric sclera; NC in place  Lymphatic: No lymphadenopathy in cervical or axillary nodes  Cardiovascular: RRR, No murmurs, gallops or rubs appreciated; trace left leg edema > right leg  Respiratory: Lungs with expiratory wheezes, crackles at bases; diminished breath sounds at bases  Gastrointestinal:  Soft, nondistended, nontender, +BS	  Skin: No overt rash.   Neurologic: AOx3,  motor and sensory function grossly intact  Extremities: Moving all extremities equally in ROM and strength.   MSK: no joint erythema or swelling   Vascular: palpable dp, pt and radial pulses 2+ bilaterally  	2+ edema noted bilaterally  Psych:  Normal mood and affect, responds to questions appropriately    LABS:                        13.5   6.0   )-----------( 122      ( 2019 06:52 )             42.8     04-20    137  |  99  |  23  ----------------------------<  116<H>  4.3   |  24  |  1.07    Ca    9.3      2019 06:51  Phos  3.3     04-20  Mg     2.0     04-20    TPro  7.9  /  Alb  4.1  /  TBili  1.3<H>  /  DBili  x   /  AST  55<H>  /  ALT  32  /  AlkPhos  171<H>      PT/INR - ( 2019 06:52 )   PT: 39.7 sec;   INR: 3.35 ratio         PTT - ( 2019 06:52 )  PTT:40.6 sec  Serum Pro-Brain Natriuretic Peptide (19 @ 15:59)    Serum Pro-Brain Natriuretic Peptide: 6406 pg/mL        Urinalysis Basic - ( 2019 15:59 )    Color: Light Yellow / Appearance: Clear / S.012 / pH: x  Gluc: x / Ketone: Negative  / Bili: Negative / Urobili: Negative   Blood: x / Protein: 100 / Nitrite: Negative   Leuk Esterase: Negative / RBC: 10 /hpf / WBC 7 /HPF   Sq Epi: x / Non Sq Epi: 0 /hpf / Bacteria: Negative    < from: CT Chest No Cont (19 @ 17:06) >  CHEST:     LUNGS AND LARGE AIRWAYS: Patent central airways. Motion artifact.   Bilateral mosaic attenuation due to air trapping. Bilateral apical   scarring/thickening with adjacent bronchiectasis. A few tiny scattered   calcified granulomas. Bilateral lower lobe subsegmental atelectasis.  PLEURA: No pleural effusion.  VESSELS: Atherosclerotic changes of the thoracic aorta. No thoracic   aneurysm. An 8 mm calcified splenic artery aneurysm.  HEART: Cardiomegaly with left atrial enlargement. Trace pericardial   effusion. Calcification involving the aortic valve and coronary arteries.  MEDIASTINUM AND VALERIE: No lymphadenopathy.  CHEST WALL AND LOWER NECK: Within normal limits.  VISUALIZED UPPER ABDOMEN: Within normal limits.  BONES: Bone demineralization. Thoracic dextroscoliosis with degenerative   changes of the spine.    IMPRESSION: No pneumonia.    < end of copied text >    < from: Xray Chest 1 View- PORTABLE-Urgent (19 @ 15:37) >  The heart is slightly enlarged. The lungs are clear. Calcified aortic   knob. No pneumothorax. No pleural effusions.     < end of copied text >        RADIOLOGY & ADDITIONAL TESTS:    Imaging Personally Reviewed: Chest CT  Consultant(s) Notes Reviewed:    Care Discussed with Consultants/Other Providers:

## 2019-04-20 NOTE — PROGRESS NOTE ADULT - PROBLEM SELECTOR PLAN 1
Pt presenting with acute desaturations in setting of bronchiectasis and atelectasis. Now on O2 4L. Without O2, pt desats to 85% with good waveform. Suspect this is driven by the infectious state rather than alternate dx like pulmonary embolism. Her faint crackles likely from pneumonia process rather than overt CHF. Will eventually require titration of her O2-requirements.  - c/w O2 and wean slowly  - will start duonebs q6  - chest pt  - start duonebs for airway clearance  - If worsening will consider steroids.  - Hold metoprolol tonight given potential to worsen bronchospasm. Will need to be restarted when respiratory status improves given hx of afib. Pt presenting with acute desaturations in setting of bronchiectasis and atelectasis. Now on O2 4L. Without O2, pt desats to 85% with good waveform. Suspect this is driven by the infectious state rather than alternate dx like pulmonary embolism. Her faint crackles likely from pneumonia process rather than overt CHF. Will eventually require titration of her O2-requirements.  - c/w O2 and wean slowly  - duonebs q6  - chest pt, incentive spirometer  - start duonebs for airway clearance  - If worsening will consider steroids.  - Hold metoprolol tonight given potential to worsen bronchospasm. Will need to be restarted when respiratory status improves given hx of afib.

## 2019-04-20 NOTE — PROGRESS NOTE ADULT - PROBLEM SELECTOR PLAN 10
Pt already therapeutically anticoagulated  DASH diet    Rebeka Dempsey MD, PhD  PGY-2| Internal Medicine  [phone] / [id number] Pt already therapeutically anticoagulated  DASH diet

## 2019-04-20 NOTE — DISCHARGE NOTE PROVIDER - CARE PROVIDER_API CALL
Gnozález Miranda)  Internal Medicine  [address], [address] [address]  Phone: [phone]  Fax: [phone]  Follow Up Time:

## 2019-04-21 LAB
ALBUMIN SERPL ELPH-MCNC: 3.3 G/DL — SIGNIFICANT CHANGE UP (ref 3.3–5)
ALP SERPL-CCNC: 142 U/L — HIGH (ref 40–120)
ALT FLD-CCNC: 27 U/L — SIGNIFICANT CHANGE UP (ref 10–45)
ANION GAP SERPL CALC-SCNC: 10 MMOL/L — SIGNIFICANT CHANGE UP (ref 5–17)
APTT BLD: 43 SEC — HIGH (ref 27.5–36.3)
AST SERPL-CCNC: 56 U/L — HIGH (ref 10–40)
BILIRUB SERPL-MCNC: 0.7 MG/DL — SIGNIFICANT CHANGE UP (ref 0.2–1.2)
BUN SERPL-MCNC: 20 MG/DL — SIGNIFICANT CHANGE UP (ref 7–23)
CALCIUM SERPL-MCNC: 8.9 MG/DL — SIGNIFICANT CHANGE UP (ref 8.4–10.5)
CHLORIDE SERPL-SCNC: 100 MMOL/L — SIGNIFICANT CHANGE UP (ref 96–108)
CO2 SERPL-SCNC: 26 MMOL/L — SIGNIFICANT CHANGE UP (ref 22–31)
CREAT SERPL-MCNC: 1.01 MG/DL — SIGNIFICANT CHANGE UP (ref 0.5–1.3)
GLUCOSE SERPL-MCNC: 102 MG/DL — HIGH (ref 70–99)
HCT VFR BLD CALC: 37.8 % — SIGNIFICANT CHANGE UP (ref 34.5–45)
HGB BLD-MCNC: 11.6 G/DL — SIGNIFICANT CHANGE UP (ref 11.5–15.5)
INR BLD: 3.14 RATIO — HIGH (ref 0.88–1.16)
MAGNESIUM SERPL-MCNC: 2.1 MG/DL — SIGNIFICANT CHANGE UP (ref 1.6–2.6)
MCHC RBC-ENTMCNC: 30.7 GM/DL — LOW (ref 32–36)
MCHC RBC-ENTMCNC: 31.9 PG — SIGNIFICANT CHANGE UP (ref 27–34)
MCV RBC AUTO: 104 FL — HIGH (ref 80–100)
PHOSPHATE SERPL-MCNC: 2.6 MG/DL — SIGNIFICANT CHANGE UP (ref 2.5–4.5)
PLATELET # BLD AUTO: 123 K/UL — LOW (ref 150–400)
POTASSIUM SERPL-MCNC: 4.1 MMOL/L — SIGNIFICANT CHANGE UP (ref 3.5–5.3)
POTASSIUM SERPL-SCNC: 4.1 MMOL/L — SIGNIFICANT CHANGE UP (ref 3.5–5.3)
PROT SERPL-MCNC: 6.4 G/DL — SIGNIFICANT CHANGE UP (ref 6–8.3)
PROTHROM AB SERPL-ACNC: 37.4 SEC — HIGH (ref 10–12.9)
RBC # BLD: 3.64 M/UL — LOW (ref 3.8–5.2)
RBC # FLD: 13.2 % — SIGNIFICANT CHANGE UP (ref 10.3–14.5)
SODIUM SERPL-SCNC: 136 MMOL/L — SIGNIFICANT CHANGE UP (ref 135–145)
WBC # BLD: 6.1 K/UL — SIGNIFICANT CHANGE UP (ref 3.8–10.5)
WBC # FLD AUTO: 6.1 K/UL — SIGNIFICANT CHANGE UP (ref 3.8–10.5)

## 2019-04-21 PROCEDURE — 99233 SBSQ HOSP IP/OBS HIGH 50: CPT | Mod: GC

## 2019-04-21 RX ADMIN — Medication 30 MILLIGRAM(S): at 14:08

## 2019-04-21 RX ADMIN — Medication 200 MILLIGRAM(S): at 21:39

## 2019-04-21 RX ADMIN — CEFTRIAXONE 100 GRAM(S): 500 INJECTION, POWDER, FOR SOLUTION INTRAMUSCULAR; INTRAVENOUS at 21:39

## 2019-04-21 RX ADMIN — Medication 30 MILLIGRAM(S): at 11:11

## 2019-04-21 RX ADMIN — Medication 3 MILLILITER(S): at 17:37

## 2019-04-21 RX ADMIN — Medication 1 TABLET(S): at 11:11

## 2019-04-21 RX ADMIN — Medication 100 MILLIGRAM(S): at 21:39

## 2019-04-21 RX ADMIN — Medication 3 MILLILITER(S): at 05:26

## 2019-04-21 RX ADMIN — Medication 100 MILLIGRAM(S): at 13:42

## 2019-04-21 RX ADMIN — Medication 100 MILLIGRAM(S): at 06:29

## 2019-04-21 RX ADMIN — Medication 3 MILLILITER(S): at 23:49

## 2019-04-21 RX ADMIN — ATORVASTATIN CALCIUM 10 MILLIGRAM(S): 80 TABLET, FILM COATED ORAL at 21:39

## 2019-04-21 RX ADMIN — Medication 3 MILLILITER(S): at 11:21

## 2019-04-21 RX ADMIN — AZITHROMYCIN 250 MILLIGRAM(S): 500 TABLET, FILM COATED ORAL at 16:21

## 2019-04-21 NOTE — PROGRESS NOTE ADULT - PROBLEM SELECTOR PLAN 2
Cannot exclude superimposed bacterial PNA in setting of influenza and coronavirus infections.   - Continue empiric PNA treatment with azithromycin and ceftriaxone

## 2019-04-21 NOTE — PROGRESS NOTE ADULT - PROBLEM SELECTOR PLAN 10
DVT ppx: SCDs, Coumadin on hold in setting of supratherapeutic INR  Diet: DASH diet DVT ppx: SCDs, Coumadin on hold in setting of supratherapeutic INR  Diet: DASH diet  pending PT

## 2019-04-21 NOTE — PROGRESS NOTE ADULT - SUBJECTIVE AND OBJECTIVE BOX
ESTRELLA ESPINOZA  Patient is a 89y old  Female who presents with a chief complaint of Cough (20 Apr 2019 14:08)    INTERVAL HPI / SUBJECTIVE:  No overnight events noted. Patient reports symptoms are improved this morning. Continues to have cough. Feels tired.     REVIEW OF SYSTEMS:   Constitutional: no fatigue, fever, chills, generalized weakness  HEENT: no eye pain, vision changes, rhinorrhea, sore throat  Respiratory: no cough, wheezing, shortness of breath  CV: no chest pain, palpitations, dyspnea on exertion, orthopnea, PND  GI: no decreased appetite, nausea, vomiting, abdominal pain, diarrhea, constipation, melena  : no dysuria, hematuria, urinary frequency, incontinence, nocturia  MSK: no joint or muscle pain, muscle weakness, joint swelling  Skin: no rashes, bruising, hair loss, color change  Neuro: no headache, dizziness, fainting, paresthesias, memory loss  Endo: no heat or cold intolerance, increased thirst, hot flashes  Psych: no changes in mood    MEDICATIONS:   MEDICATIONS  (STANDING):  ALBUTerol/ipratropium for Nebulization 3 milliLiter(s) Nebulizer every 6 hours  atorvastatin 10 milliGRAM(s) Oral at bedtime  azithromycin  IVPB 500 milliGRAM(s) IV Intermittent every 24 hours  benzonatate 100 milliGRAM(s) Oral every 8 hours  calcium carbonate   1250 mG (OsCal) 1 Tablet(s) Oral daily  cefTRIAXone   IVPB 1 Gram(s) IV Intermittent every 24 hours  losartan 25 milliGRAM(s) Oral daily  multivitamin 1 Tablet(s) Oral daily  oseltamivir 30 milliGRAM(s) Oral daily  predniSONE   Tablet 30 milliGRAM(s) Oral daily    MEDICATIONS  (PRN):  acetaminophen   Tablet .. 650 milliGRAM(s) Oral every 6 hours PRN Temp greater or equal to 38C (100.4F), Mild Pain (1 - 3)  artificial  tears Solution 1 Drop(s) Both EYES two times a day PRN Dry Eyes  guaiFENesin    Syrup 200 milliGRAM(s) Oral every 6 hours PRN Cough      ALLERGIES:   No Known Allergies      OBJECTIVE:  Vital Signs Last 24 Hrs  T(C): 37.3 (21 Apr 2019 14:04), Max: 37.7 (20 Apr 2019 21:15)  T(F): 99.1 (21 Apr 2019 14:04), Max: 99.9 (20 Apr 2019 21:15)  HR: 85 (21 Apr 2019 14:04) (78 - 94)  BP: 150/74 (21 Apr 2019 14:04) (124/71 - 157/73)  RR: 20 (21 Apr 2019 14:04) (17 - 22)  SpO2: 91% (21 Apr 2019 14:04) (90% - 95%)    PHYSICAL EXAM:  General: Pleasant elderly woman in NAD, nasal cannula in place  HEENT: EOMI, PERRL, conjunctiva and sclera clear, nasal congestion  Neck: Supple, no JVD, normal thyroid  Chest/Lungs: Coarse breath sounds bilaterally, no wheezing  Heart: RRR, normal S1, S2, 2/6 holosystolic murmur throughout  Abdomen: Soft, nondistended, NTTP, normoactive bowel sounds  Extremities: Peripheral pulses 2+ B/L, trace pedal edema  Skin: Warm, well-perfused, no rashes or lesions  Neurological: A&Ox3, no focal deficits    LABS:  CBC Full  -  ( 21 Apr 2019 07:20 )  WBC Count : 6.1 K/uL  RBC Count : 3.64 M/uL  Hemoglobin : 11.6 g/dL  Hematocrit : 37.8 %  Platelet Count - Automated : 123 K/uL  Mean Cell Volume : 104.0 fl  Mean Cell Hemoglobin : 31.9 pg  Mean Cell Hemoglobin Concentration : 30.7 gm/dL  Auto Neutrophil # : x  Auto Lymphocyte # : x  Auto Monocyte # : x  Auto Eosinophil # : x  Auto Basophil # : x  Auto Neutrophil % : x  Auto Lymphocyte % : x  Auto Monocyte % : x  Auto Eosinophil % : x  Auto Basophil % : x    04-21    136  |  100  |  20  ----------------------------<  102<H>  4.1   |  26  |  1.01    Ca    8.9      21 Apr 2019 07:20  Phos  2.6     04-21  Mg     2.1     04-21    TPro  6.4  /  Alb  3.3  /  TBili  0.7  /  DBili  x   /  AST  56<H>  /  ALT  27  /  AlkPhos  142<H>  04-21    PT/INR - ( 21 Apr 2019 07:20 )   PT: 37.4 sec;   INR: 3.14 ratio    PTT - ( 21 Apr 2019 07:20 )  PTT:43.0 sec    Culture - Sputum (collected 20 Apr 2019 08:48)  Source: .Sputum CUP  Gram Stain (20 Apr 2019 14:20):    Numerous polymorphonuclear leukocytes per low power field    Moderate Squamous epithelial cells per low power field    Rare Gram Positive Rods per oil power field    Rare Gram positive cocci in pairs per oil power field  Preliminary Report (21 Apr 2019 09:47):    Normal Respiratory Larissa present    Culture - Urine (collected 19 Apr 2019 18:54)  Source: .Urine  Final Report (20 Apr 2019 17:38):    <10,000 CFU/mL Normal Urogenital Larissa    Culture - Blood (collected 19 Apr 2019 18:20)  Source: .Blood  Preliminary Report (20 Apr 2019 19:01):    No growth to date.    Culture - Blood (collected 19 Apr 2019 18:20)  Source: .Blood  Preliminary Report (20 Apr 2019 19:01):    No growth to date.      IMAGING:   No new imaging.     Labs and imaging personally reviewed and interpreted.

## 2019-04-21 NOTE — PROGRESS NOTE ADULT - PROBLEM SELECTOR PLAN 1
Pt presenting with acute desaturations in setting of bronchiectasis and atelectasis, cannot r/o PNA given +influenza and rhinovirus. Requiring supplemental O2 with O2 saturations in 90s.   - Continue oxygen supplementation to maintain SpO2 >92%  - DuoNebs Q6  - Chest PT, incentive spirometry  - Start prednisone 30mg daily today for continued O2 requirement and rhonchi on exam

## 2019-04-21 NOTE — PROGRESS NOTE ADULT - PROBLEM SELECTOR PLAN 7
BP within normal range.   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  - BNP 6406  - Normal LVEF as per cardiology documentation: mitral valve prolapse with moderate mitral regurgitation  - Resume home torsemide to avoid CHF exacerbation BP within normal range.   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  - BNP 6406  - Normal LVEF as per cardiology documentation: mitral valve prolapse with moderate mitral regurgitation  - Resume home torsemide to avoid CHF exacerbation

## 2019-04-21 NOTE — PROGRESS NOTE ADULT - PROBLEM SELECTOR PLAN 4
Pt presenting with fevers and tachypnea in the setting of flu positive, coronavirus positive and likely bacterial pneumonia c/w sepsis. Lactate 2.6 on admission.   - No further IVF given underlying CHF, s/p 1L in ED  - F/u UCx, BCx  - Antibiotic therapy as above

## 2019-04-22 LAB
ALBUMIN SERPL ELPH-MCNC: 3.1 G/DL — LOW (ref 3.3–5)
ALP SERPL-CCNC: 138 U/L — HIGH (ref 40–120)
ALT FLD-CCNC: 26 U/L — SIGNIFICANT CHANGE UP (ref 10–45)
ANION GAP SERPL CALC-SCNC: 10 MMOL/L — SIGNIFICANT CHANGE UP (ref 5–17)
APTT BLD: 39.5 SEC — HIGH (ref 27.5–36.3)
AST SERPL-CCNC: 46 U/L — HIGH (ref 10–40)
BILIRUB SERPL-MCNC: 0.4 MG/DL — SIGNIFICANT CHANGE UP (ref 0.2–1.2)
BUN SERPL-MCNC: 25 MG/DL — HIGH (ref 7–23)
CALCIUM SERPL-MCNC: 8.8 MG/DL — SIGNIFICANT CHANGE UP (ref 8.4–10.5)
CHLORIDE SERPL-SCNC: 99 MMOL/L — SIGNIFICANT CHANGE UP (ref 96–108)
CO2 SERPL-SCNC: 26 MMOL/L — SIGNIFICANT CHANGE UP (ref 22–31)
CREAT SERPL-MCNC: 0.99 MG/DL — SIGNIFICANT CHANGE UP (ref 0.5–1.3)
CULTURE RESULTS: SIGNIFICANT CHANGE UP
GLUCOSE SERPL-MCNC: 134 MG/DL — HIGH (ref 70–99)
HCT VFR BLD CALC: 39.1 % — SIGNIFICANT CHANGE UP (ref 34.5–45)
HGB BLD-MCNC: 12.6 G/DL — SIGNIFICANT CHANGE UP (ref 11.5–15.5)
INR BLD: 2.87 RATIO — HIGH (ref 0.88–1.16)
MCHC RBC-ENTMCNC: 32.1 GM/DL — SIGNIFICANT CHANGE UP (ref 32–36)
MCHC RBC-ENTMCNC: 33.4 PG — SIGNIFICANT CHANGE UP (ref 27–34)
MCV RBC AUTO: 104 FL — HIGH (ref 80–100)
PHOSPHATE SERPL-MCNC: 2.9 MG/DL — SIGNIFICANT CHANGE UP (ref 2.5–4.5)
PLATELET # BLD AUTO: 129 K/UL — LOW (ref 150–400)
POTASSIUM SERPL-MCNC: 4.4 MMOL/L — SIGNIFICANT CHANGE UP (ref 3.5–5.3)
POTASSIUM SERPL-SCNC: 4.4 MMOL/L — SIGNIFICANT CHANGE UP (ref 3.5–5.3)
PROT SERPL-MCNC: 6.7 G/DL — SIGNIFICANT CHANGE UP (ref 6–8.3)
PROTHROM AB SERPL-ACNC: 33.8 SEC — HIGH (ref 10–12.9)
RBC # BLD: 3.76 M/UL — LOW (ref 3.8–5.2)
RBC # FLD: 13.1 % — SIGNIFICANT CHANGE UP (ref 10.3–14.5)
SODIUM SERPL-SCNC: 135 MMOL/L — SIGNIFICANT CHANGE UP (ref 135–145)
SPECIMEN SOURCE: SIGNIFICANT CHANGE UP
WBC # BLD: 3.8 K/UL — SIGNIFICANT CHANGE UP (ref 3.8–10.5)
WBC # FLD AUTO: 3.8 K/UL — SIGNIFICANT CHANGE UP (ref 3.8–10.5)

## 2019-04-22 PROCEDURE — 93306 TTE W/DOPPLER COMPLETE: CPT | Mod: 26

## 2019-04-22 PROCEDURE — 71045 X-RAY EXAM CHEST 1 VIEW: CPT | Mod: 26

## 2019-04-22 PROCEDURE — 99233 SBSQ HOSP IP/OBS HIGH 50: CPT | Mod: GC

## 2019-04-22 RX ORDER — WARFARIN SODIUM 2.5 MG/1
2.5 TABLET ORAL ONCE
Qty: 0 | Refills: 0 | Status: COMPLETED | OUTPATIENT
Start: 2019-04-22 | End: 2019-04-22

## 2019-04-22 RX ADMIN — Medication 100 MILLIGRAM(S): at 06:16

## 2019-04-22 RX ADMIN — Medication 1 TABLET(S): at 12:36

## 2019-04-22 RX ADMIN — Medication 3 MILLILITER(S): at 17:39

## 2019-04-22 RX ADMIN — WARFARIN SODIUM 2.5 MILLIGRAM(S): 2.5 TABLET ORAL at 21:51

## 2019-04-22 RX ADMIN — Medication 100 MILLIGRAM(S): at 12:36

## 2019-04-22 RX ADMIN — ATORVASTATIN CALCIUM 10 MILLIGRAM(S): 80 TABLET, FILM COATED ORAL at 21:51

## 2019-04-22 RX ADMIN — Medication 3 MILLILITER(S): at 11:16

## 2019-04-22 RX ADMIN — LOSARTAN POTASSIUM 25 MILLIGRAM(S): 100 TABLET, FILM COATED ORAL at 06:16

## 2019-04-22 RX ADMIN — Medication 30 MILLIGRAM(S): at 06:16

## 2019-04-22 RX ADMIN — Medication 10 MILLIGRAM(S): at 06:16

## 2019-04-22 RX ADMIN — Medication 100 MILLIGRAM(S): at 21:51

## 2019-04-22 RX ADMIN — Medication 3 MILLILITER(S): at 05:47

## 2019-04-22 RX ADMIN — Medication 30 MILLIGRAM(S): at 12:36

## 2019-04-22 NOTE — PHYSICAL THERAPY INITIAL EVALUATION ADULT - GAIT DEVIATIONS NOTED, PT EVAL
decreased step length/decreased stride length/decreased weight-shifting ability/increased time in double stance

## 2019-04-22 NOTE — PROGRESS NOTE ADULT - PROBLEM SELECTOR PLAN 2
Cannot exclude superimposed bacterial PNA in setting of influenza and coronavirus infections.   - Continue empiric PNA treatment with azithromycin and ceftriaxone Cannot exclude superimposed bacterial PNA in setting of influenza and coronavirus infections.   - d/c empiric PNA treatment with azithromycin and ceftriaxone

## 2019-04-22 NOTE — PHYSICAL THERAPY INITIAL EVALUATION ADULT - DISCHARGE DISPOSITION, PT EVAL
home w/ assist/Subacute rehab; if pt is D/C'd home will require assist with all functional mobility and Home pT/home w/ home PT/rehabilitation facility

## 2019-04-22 NOTE — PHYSICAL THERAPY INITIAL EVALUATION ADULT - TRANSFER TRAINING, PT EVAL
GOAL: Pt will perform ALL transfers (I), w/use of least restrictive assistive device as needed, in 4 weeks.

## 2019-04-22 NOTE — PHYSICAL THERAPY INITIAL EVALUATION ADULT - PRECAUTIONS/LIMITATIONS, REHAB EVAL
droplet precautions; pt was found to be febrile to 102 and was sent to ED for further care. Pt  found hypoxic in ED with desaturation to 82%, placed on NC. Admitted with sepsis 2/2 influenza and rhinovirus infection.  CT Chest (-) PNA, [facility] Duplex (-) DVTs. Cxr 4/22: Trace B/L pleural effusions/isolation precautions/fall precautions

## 2019-04-22 NOTE — PROGRESS NOTE ADULT - PROBLEM SELECTOR PLAN 7
BP within normal range.   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  - BNP 6406  - Normal LVEF as per cardiology documentation: mitral valve prolapse with moderate mitral regurgitation  - Resume home torsemide to avoid CHF exacerbation

## 2019-04-22 NOTE — PROGRESS NOTE ADULT - SUBJECTIVE AND OBJECTIVE BOX
Authored by Dr John Stephens [phone] [facility] / [id number] [facility]    Patient is a 89y old  Female who presents with a chief complaint of Cough (21 Apr 2019 14:13)    SUBJECTIVE / OVERNIGHT EVENTS: No acute events overnight     MEDICATIONS  (STANDING):  ALBUTerol/ipratropium for Nebulization 3 milliLiter(s) Nebulizer every 6 hours  atorvastatin 10 milliGRAM(s) Oral at bedtime  azithromycin  IVPB 500 milliGRAM(s) IV Intermittent every 24 hours  benzonatate 100 milliGRAM(s) Oral every 8 hours  calcium carbonate   1250 mG (OsCal) 1 Tablet(s) Oral daily  cefTRIAXone   IVPB 1 Gram(s) IV Intermittent every 24 hours  losartan 25 milliGRAM(s) Oral daily  multivitamin 1 Tablet(s) Oral daily  oseltamivir 30 milliGRAM(s) Oral daily  predniSONE   Tablet 30 milliGRAM(s) Oral daily  torsemide 10 milliGRAM(s) Oral daily    MEDICATIONS  (PRN):  acetaminophen   Tablet .. 650 milliGRAM(s) Oral every 6 hours PRN Temp greater or equal to 38C (100.4F), Mild Pain (1 - 3)  artificial  tears Solution 1 Drop(s) Both EYES two times a day PRN Dry Eyes  guaiFENesin    Syrup 200 milliGRAM(s) Oral every 6 hours PRN Cough      Vital Signs Last 24 Hrs  T(C): 36.4 (22 Apr 2019 04:29), Max: 37.3 (21 Apr 2019 14:04)  T(F): 97.5 (22 Apr 2019 04:29), Max: 99.1 (21 Apr 2019 14:04)  HR: 82 (22 Apr 2019 05:48) (74 - 89)  BP: 150/79 (22 Apr 2019 04:29) (114/60 - 150/79)  BP(mean): --  RR: 20 (22 Apr 2019 04:29) (17 - 20)  SpO2: 92% (22 Apr 2019 05:48) (90% - 95%)  CAPILLARY BLOOD GLUCOSE        I&O's Summary    21 Apr 2019 07:01  -  22 Apr 2019 07:00  --------------------------------------------------------  IN: 50 mL / OUT: 0 mL / NET: 50 mL        PHYSICAL EXAM  General: Pleasant elderly woman in NAD, nasal cannula in place  HEENT: EOMI, PERRL, conjunctiva and sclera clear, nasal congestion  Neck: Supple, no JVD, normal thyroid  Chest/Lungs: Coarse breath sounds bilaterally, no wheezing  Heart: RRR, normal S1, S2, 2/6 holosystolic murmur throughout  Abdomen: Soft, nondistended, NTTP, normoactive bowel sounds  Extremities: Peripheral pulses 2+ B/L, trace pedal edema  Skin: Warm, well-perfused, no rashes or lesions  Neurological: A&Ox3, no focal     LABS:                        11.6   6.1   )-----------( 123      ( 21 Apr 2019 07:20 )             37.8     04-21    136  |  100  |  20  ----------------------------<  102<H>  4.1   |  26  |  1.01    Ca    8.9      21 Apr 2019 07:20  Phos  2.6     04-21  Mg     2.1     04-21    TPro  6.4  /  Alb  3.3  /  TBili  0.7  /  DBili  x   /  AST  56<H>  /  ALT  27  /  AlkPhos  142<H>  04-21    PT/INR - ( 21 Apr 2019 07:20 )   PT: 37.4 sec;   INR: 3.14 ratio         PTT - ( 21 Apr 2019 07:20 )  PTT:43.0 sec          Culture - Sputum (collected 20 Apr 2019 08:48)  Source: .Sputum CUP  Gram Stain (20 Apr 2019 14:20):    Numerous polymorphonuclear leukocytes per low power field    Moderate Squamous epithelial cells per low power field    Rare Gram Positive Rods per oil power field    Rare Gram positive cocci in pairs per oil power field  Preliminary Report (21 Apr 2019 09:47):    Normal Respiratory Larissa present    Culture - Urine (collected 19 Apr 2019 18:54)  Source: .Urine  Final Report (20 Apr 2019 17:38):    <10,000 CFU/mL Normal Urogenital Larissa    Culture - Blood (collected 19 Apr 2019 18:20)  Source: .Blood  Preliminary Report (20 Apr 2019 19:01):    No growth to date.    Culture - Blood (collected 19 Apr 2019 18:20)  Source: .Blood  Preliminary Report (20 Apr 2019 19:01):    No growth to date.        RADIOLOGY & ADDITIONAL TESTS:    Imaging Personally Reviewed:  Consultant(s) Notes Reviewed:    Care Discussed with Consultants/Other Providers: Authored by Dr John Stephens [phone] [facility] / [id number] [address]    Patient is a 89y old  Female who presents with a chief complaint of Cough (21 Apr 2019 14:13)    SUBJECTIVE / OVERNIGHT EVENTS: No acute events overnight     This morning pt reports she feels better, without fever/chills, N/V, CP, or SOB. Continues to have productive cough.     MEDICATIONS  (STANDING):  ALBUTerol/ipratropium for Nebulization 3 milliLiter(s) Nebulizer every 6 hours  atorvastatin 10 milliGRAM(s) Oral at bedtime  azithromycin  IVPB 500 milliGRAM(s) IV Intermittent every 24 hours  benzonatate 100 milliGRAM(s) Oral every 8 hours  calcium carbonate   1250 mG (OsCal) 1 Tablet(s) Oral daily  cefTRIAXone   IVPB 1 Gram(s) IV Intermittent every 24 hours  losartan 25 milliGRAM(s) Oral daily  multivitamin 1 Tablet(s) Oral daily  oseltamivir 30 milliGRAM(s) Oral daily  predniSONE   Tablet 30 milliGRAM(s) Oral daily  torsemide 10 milliGRAM(s) Oral daily    MEDICATIONS  (PRN):  acetaminophen   Tablet .. 650 milliGRAM(s) Oral every 6 hours PRN Temp greater or equal to 38C (100.4F), Mild Pain (1 - 3)  artificial  tears Solution 1 Drop(s) Both EYES two times a day PRN Dry Eyes  guaiFENesin    Syrup 200 milliGRAM(s) Oral every 6 hours PRN Cough      Vital Signs Last 24 Hrs  T(C): 36.4 (22 Apr 2019 04:29), Max: 37.3 (21 Apr 2019 14:04)  T(F): 97.5 (22 Apr 2019 04:29), Max: 99.1 (21 Apr 2019 14:04)  HR: 82 (22 Apr 2019 05:48) (74 - 89)  BP: 150/79 (22 Apr 2019 04:29) (114/60 - 150/79)  BP(mean): --  RR: 20 (22 Apr 2019 04:29) (17 - 20)  SpO2: 92% (22 Apr 2019 05:48) (90% - 95%)  CAPILLARY BLOOD GLUCOSE        I&O's Summary    21 Apr 2019 07:01  -  22 Apr 2019 07:00  --------------------------------------------------------  IN: 50 mL / OUT: 0 mL / NET: 50 mL        PHYSICAL EXAM  General: Pleasant elderly woman in NAD, nasal cannula in place on 2.5L  HEENT: EOMI, PERRL, conjunctiva and sclera clear, nasal congestion  Neck: Supple, no JVD, normal thyroid  Chest/Lungs: Coarse breath sounds bilaterally, no wheezing  Heart: RRR, normal S1, S2, 2/6 holosystolic murmur throughout  Abdomen: Soft, nondistended, NTTP, normoactive bowel sounds  Extremities: Peripheral pulses 2+ B/L, trace pedal edema  Skin: Warm, well-perfused, no rashes or lesions  Neurological: A&Ox3, no focal     LABS:                        11.6   6.1   )-----------( 123      ( 21 Apr 2019 07:20 )             37.8     04-21    136  |  100  |  20  ----------------------------<  102<H>  4.1   |  26  |  1.01    Ca    8.9      21 Apr 2019 07:20  Phos  2.6     04-21  Mg     2.1     04-21    TPro  6.4  /  Alb  3.3  /  TBili  0.7  /  DBili  x   /  AST  56<H>  /  ALT  27  /  AlkPhos  142<H>  04-21    PT/INR - ( 21 Apr 2019 07:20 )   PT: 37.4 sec;   INR: 3.14 ratio         PTT - ( 21 Apr 2019 07:20 )  PTT:43.0 sec          Culture - Sputum (collected 20 Apr 2019 08:48)  Source: .Sputum CUP  Gram Stain (20 Apr 2019 14:20):    Numerous polymorphonuclear leukocytes per low power field    Moderate Squamous epithelial cells per low power field    Rare Gram Positive Rods per oil power field    Rare Gram positive cocci in pairs per oil power field  Preliminary Report (21 Apr 2019 09:47):    Normal Respiratory Larissa present    Culture - Urine (collected 19 Apr 2019 18:54)  Source: .Urine  Final Report (20 Apr 2019 17:38):    <10,000 CFU/mL Normal Urogenital Larissa    Culture - Blood (collected 19 Apr 2019 18:20)  Source: .Blood  Preliminary Report (20 Apr 2019 19:01):    No growth to date.    Culture - Blood (collected 19 Apr 2019 18:20)  Source: .Blood  Preliminary Report (20 Apr 2019 19:01):    No growth to date.        RADIOLOGY & ADDITIONAL TESTS:    Imaging Personally Reviewed:  Consultant(s) Notes Reviewed:    Care Discussed with Consultants/Other Providers:

## 2019-04-22 NOTE — PROGRESS NOTE ADULT - PROBLEM SELECTOR PLAN 4
Pt presenting with fevers and tachypnea in the setting of flu positive, coronavirus positive and likely bacterial pneumonia c/w sepsis. Lactate 2.6 on admission.   - No further IVF given underlying CHF, s/p 1L in ED  - F/u UCx, BCx  - Antibiotic therapy as above Pt presenting with fevers and tachypnea in the setting of flu positive, coronavirus positive and likely bacterial pneumonia c/w sepsis. Lactate 2.6 on admission.   - No further IVF given underlying CHF, s/p 1L in ED  - F/u UCx, BCx

## 2019-04-22 NOTE — PHYSICAL THERAPY INITIAL EVALUATION ADULT - ADDITIONAL COMMENTS
Pt lives alone in an apartment building, no steps to negotiate +Elevator. Pt was ambulating (I) without use of an AD and was (I) with all ADLS PTA. Pt owns rollator and R/W

## 2019-04-22 NOTE — PROGRESS NOTE ADULT - PROBLEM SELECTOR PLAN 10
DVT ppx: SCDs, Coumadin on hold in setting of supratherapeutic INR  Diet: DASH diet  pending PT DVT ppx: SCDs, therapeutic INR this morning  Diet: DASH diet  pending PT

## 2019-04-23 LAB
ALBUMIN SERPL ELPH-MCNC: 3.1 G/DL — LOW (ref 3.3–5)
ALP SERPL-CCNC: 138 U/L — HIGH (ref 40–120)
ALT FLD-CCNC: 37 U/L — SIGNIFICANT CHANGE UP (ref 10–45)
ANION GAP SERPL CALC-SCNC: 12 MMOL/L — SIGNIFICANT CHANGE UP (ref 5–17)
APTT BLD: 35.1 SEC — SIGNIFICANT CHANGE UP (ref 27.5–36.3)
AST SERPL-CCNC: 61 U/L — HIGH (ref 10–40)
BILIRUB SERPL-MCNC: 0.4 MG/DL — SIGNIFICANT CHANGE UP (ref 0.2–1.2)
BUN SERPL-MCNC: 32 MG/DL — HIGH (ref 7–23)
CALCIUM SERPL-MCNC: 9.1 MG/DL — SIGNIFICANT CHANGE UP (ref 8.4–10.5)
CHLORIDE SERPL-SCNC: 99 MMOL/L — SIGNIFICANT CHANGE UP (ref 96–108)
CO2 SERPL-SCNC: 26 MMOL/L — SIGNIFICANT CHANGE UP (ref 22–31)
CREAT SERPL-MCNC: 1.04 MG/DL — SIGNIFICANT CHANGE UP (ref 0.5–1.3)
GLUCOSE SERPL-MCNC: 108 MG/DL — HIGH (ref 70–99)
HCT VFR BLD CALC: 39.1 % — SIGNIFICANT CHANGE UP (ref 34.5–45)
HGB BLD-MCNC: 12.5 G/DL — SIGNIFICANT CHANGE UP (ref 11.5–15.5)
INR BLD: 2.65 RATIO — HIGH (ref 0.88–1.16)
MCHC RBC-ENTMCNC: 32 GM/DL — SIGNIFICANT CHANGE UP (ref 32–36)
MCHC RBC-ENTMCNC: 32.8 PG — SIGNIFICANT CHANGE UP (ref 27–34)
MCV RBC AUTO: 103 FL — HIGH (ref 80–100)
PHOSPHATE SERPL-MCNC: 2 MG/DL — LOW (ref 2.5–4.5)
PLATELET # BLD AUTO: 169 K/UL — SIGNIFICANT CHANGE UP (ref 150–400)
POTASSIUM SERPL-MCNC: 4.2 MMOL/L — SIGNIFICANT CHANGE UP (ref 3.5–5.3)
POTASSIUM SERPL-SCNC: 4.2 MMOL/L — SIGNIFICANT CHANGE UP (ref 3.5–5.3)
PROT SERPL-MCNC: 6.7 G/DL — SIGNIFICANT CHANGE UP (ref 6–8.3)
PROTHROM AB SERPL-ACNC: 31.4 SEC — HIGH (ref 10–12.9)
RBC # BLD: 3.81 M/UL — SIGNIFICANT CHANGE UP (ref 3.8–5.2)
RBC # FLD: 12.8 % — SIGNIFICANT CHANGE UP (ref 10.3–14.5)
SODIUM SERPL-SCNC: 137 MMOL/L — SIGNIFICANT CHANGE UP (ref 135–145)
WBC # BLD: 6.4 K/UL — SIGNIFICANT CHANGE UP (ref 3.8–10.5)
WBC # FLD AUTO: 6.4 K/UL — SIGNIFICANT CHANGE UP (ref 3.8–10.5)

## 2019-04-23 PROCEDURE — 99233 SBSQ HOSP IP/OBS HIGH 50: CPT | Mod: GC

## 2019-04-23 RX ORDER — METOPROLOL TARTRATE 50 MG
50 TABLET ORAL DAILY
Qty: 0 | Refills: 0 | Status: DISCONTINUED | OUTPATIENT
Start: 2019-04-23 | End: 2019-04-24

## 2019-04-23 RX ORDER — WARFARIN SODIUM 2.5 MG/1
2.5 TABLET ORAL ONCE
Qty: 0 | Refills: 0 | Status: COMPLETED | OUTPATIENT
Start: 2019-04-23 | End: 2019-04-23

## 2019-04-23 RX ORDER — SODIUM,POTASSIUM PHOSPHATES 278-250MG
1 POWDER IN PACKET (EA) ORAL ONCE
Qty: 0 | Refills: 0 | Status: COMPLETED | OUTPATIENT
Start: 2019-04-23 | End: 2019-04-23

## 2019-04-23 RX ADMIN — Medication 30 MILLIGRAM(S): at 13:34

## 2019-04-23 RX ADMIN — WARFARIN SODIUM 2.5 MILLIGRAM(S): 2.5 TABLET ORAL at 22:05

## 2019-04-23 RX ADMIN — Medication 3 MILLILITER(S): at 05:56

## 2019-04-23 RX ADMIN — Medication 100 MILLIGRAM(S): at 06:56

## 2019-04-23 RX ADMIN — Medication 1 TABLET(S): at 13:34

## 2019-04-23 RX ADMIN — LOSARTAN POTASSIUM 25 MILLIGRAM(S): 100 TABLET, FILM COATED ORAL at 06:55

## 2019-04-23 RX ADMIN — Medication 100 MILLIGRAM(S): at 22:05

## 2019-04-23 RX ADMIN — Medication 1 TABLET(S): at 13:33

## 2019-04-23 RX ADMIN — Medication 3 MILLILITER(S): at 17:33

## 2019-04-23 RX ADMIN — ATORVASTATIN CALCIUM 10 MILLIGRAM(S): 80 TABLET, FILM COATED ORAL at 22:05

## 2019-04-23 RX ADMIN — Medication 10 MILLIGRAM(S): at 06:56

## 2019-04-23 RX ADMIN — Medication 100 MILLIGRAM(S): at 13:32

## 2019-04-23 RX ADMIN — Medication 1 PACKET(S): at 13:34

## 2019-04-23 RX ADMIN — Medication 3 MILLILITER(S): at 23:33

## 2019-04-23 RX ADMIN — Medication 3 MILLILITER(S): at 00:29

## 2019-04-23 RX ADMIN — Medication 30 MILLIGRAM(S): at 06:55

## 2019-04-23 NOTE — PROGRESS NOTE ADULT - PROBLEM SELECTOR PLAN 10
DVT ppx: SCDs, therapeutic INR this morning  Diet: DASH diet  PT; Subacute rehab; if pt is D/C'd home will require assist with all functional mobility and Home pT

## 2019-04-23 NOTE — PROGRESS NOTE ADULT - PROBLEM SELECTOR PLAN 1
Pt presenting with acute desaturations in setting of bronchiectasis and atelectasis, cannot r/o PNA given +influenza and rhinovirus. Requiring supplemental O2 with O2 saturations in 90s.   - Continue oxygen supplementation to maintain SpO2 >92%  - DuoNebs Q6  - Chest PT, incentive spirometry  - completed prednisone 30mg daily for continued O2 requirement and rhonchi on exam

## 2019-04-23 NOTE — PROGRESS NOTE ADULT - PROBLEM SELECTOR PLAN 4
Pt presenting with fevers and tachypnea in the setting of flu positive, coronavirus positive and likely bacterial pneumonia c/w sepsis. Lactate 2.6 on admission.   - No further IVF given underlying CHF, s/p 1L in ED  - F/u UCx, BCx

## 2019-04-23 NOTE — PROGRESS NOTE ADULT - SUBJECTIVE AND OBJECTIVE BOX
Authored by Dr John Stephens [phone] [facility] / [id number] [facility]    Patient is a 89y old  Female who presents with a chief complaint of Cough (22 Apr 2019 07:10)    SUBJECTIVE / OVERNIGHT EVENTS: No acute events overnight     MEDICATIONS  (STANDING):  ALBUTerol/ipratropium for Nebulization 3 milliLiter(s) Nebulizer every 6 hours  atorvastatin 10 milliGRAM(s) Oral at bedtime  benzonatate 100 milliGRAM(s) Oral every 8 hours  calcium carbonate   1250 mG (OsCal) 1 Tablet(s) Oral daily  losartan 25 milliGRAM(s) Oral daily  multivitamin 1 Tablet(s) Oral daily  oseltamivir 30 milliGRAM(s) Oral daily  torsemide 10 milliGRAM(s) Oral daily    MEDICATIONS  (PRN):  acetaminophen   Tablet .. 650 milliGRAM(s) Oral every 6 hours PRN Temp greater or equal to 38C (100.4F), Mild Pain (1 - 3)  artificial  tears Solution 1 Drop(s) Both EYES two times a day PRN Dry Eyes  guaiFENesin    Syrup 200 milliGRAM(s) Oral every 6 hours PRN Cough      Vital Signs Last 24 Hrs  T(C): 36.4 (23 Apr 2019 06:24), Max: 37 (22 Apr 2019 13:06)  T(F): 97.6 (23 Apr 2019 06:24), Max: 98.6 (22 Apr 2019 13:06)  HR: 97 (23 Apr 2019 06:24) (77 - 97)  BP: 165/77 (23 Apr 2019 06:24) (117/71 - 165/77)  BP(mean): --  RR: 18 (23 Apr 2019 06:24) (18 - 21)  SpO2: 94% (23 Apr 2019 06:24) (88% - 99%)  CAPILLARY BLOOD GLUCOSE        I&O's Summary      PHYSICAL EXAM  General: Pleasant elderly woman in NAD, nasal cannula in place on 2L  HEENT: EOMI, PERRL, conjunctiva and sclera clear, nasal congestion  Neck: Supple, no JVD, normal thyroid  Chest/Lungs: Coarse breath sounds bilaterally, no wheezing  Heart: RRR, normal S1, S2, 2/6 holosystolic murmur throughout  Abdomen: Soft, nondistended, NTTP, normoactive bowel sounds  Extremities: Peripheral pulses 2+ B/L, trace pedal edema  Skin: Warm, well-perfused, no rashes or lesions  Neurological: A&Ox3, no focal     LABS:                        12.6   3.8   )-----------( 129      ( 22 Apr 2019 07:19 )             39.1     04-22    135  |  99  |  25<H>  ----------------------------<  134<H>  4.4   |  26  |  0.99    Ca    8.8      22 Apr 2019 07:19  Phos  2.9     04-22  Mg     2.1     04-21    TPro  6.7  /  Alb  3.1<L>  /  TBili  0.4  /  DBili  x   /  AST  46<H>  /  ALT  26  /  AlkPhos  138<H>  04-22    PT/INR - ( 22 Apr 2019 07:19 )   PT: 33.8 sec;   INR: 2.87 ratio         PTT - ( 22 Apr 2019 07:19 )  PTT:39.5 sec          Culture - Sputum (collected 20 Apr 2019 08:48)  Source: .Sputum CUP  Gram Stain (20 Apr 2019 14:20):    Numerous polymorphonuclear leukocytes per low power field    Moderate Squamous epithelial cells per low power field    Rare Gram Positive Rods per oil power field    Rare Gram positive cocci in pairs per oil power field  Final Report (22 Apr 2019 08:43):    Normal Respiratory Larissa present    < from: Transthoracic Echocardiogram (04.22.19 @ 23:23) >  Conclusions:  1. Mitral annular calcification, otherwise normal mitral  valve. Moderate mitral regurgitation.  2. Sclerotic aortic valve leaflets with normal opening.  Mild-moderate aortic regurgitation.  3. Increased relative wall thickness with normal left  ventricular mass index, consistent with concentric left  ventricular remodeling.  4. Normal left ventricular systolic function. No segmental  wall motion abnormalities.  5. Severe right atrial enlargement.  6. Right ventricular enlargement with decreased right  ventricular systolic function.  7. Estimated right ventricular systolic pressure equals 47  mm Hg, assuming right atrial pressure equals 8 mm Hg,  consistent with mild pulmonary hypertension.  8. Normal tricuspid valve. Severe tricuspid regurgitation.    < end of copied text >      RADIOLOGY & ADDITIONAL TESTS:    Imaging Personally Reviewed:  Consultant(s) Notes Reviewed:    Care Discussed with Consultants/Other Providers: Authored by Dr John Stephens [phone] [facility] / [id number] [address]    Patient is a 89y old  Female who presents with a chief complaint of Cough (22 Apr 2019 07:10)    SUBJECTIVE / OVERNIGHT EVENTS: No acute events overnight     This morning pt is on 1L NC, denies any respiratory complaints, fever/chills or CP.     MEDICATIONS  (STANDING):  ALBUTerol/ipratropium for Nebulization 3 milliLiter(s) Nebulizer every 6 hours  atorvastatin 10 milliGRAM(s) Oral at bedtime  benzonatate 100 milliGRAM(s) Oral every 8 hours  calcium carbonate   1250 mG (OsCal) 1 Tablet(s) Oral daily  losartan 25 milliGRAM(s) Oral daily  multivitamin 1 Tablet(s) Oral daily  oseltamivir 30 milliGRAM(s) Oral daily  torsemide 10 milliGRAM(s) Oral daily    MEDICATIONS  (PRN):  acetaminophen   Tablet .. 650 milliGRAM(s) Oral every 6 hours PRN Temp greater or equal to 38C (100.4F), Mild Pain (1 - 3)  artificial  tears Solution 1 Drop(s) Both EYES two times a day PRN Dry Eyes  guaiFENesin    Syrup 200 milliGRAM(s) Oral every 6 hours PRN Cough      Vital Signs Last 24 Hrs  T(C): 36.4 (23 Apr 2019 06:24), Max: 37 (22 Apr 2019 13:06)  T(F): 97.6 (23 Apr 2019 06:24), Max: 98.6 (22 Apr 2019 13:06)  HR: 97 (23 Apr 2019 06:24) (77 - 97)  BP: 165/77 (23 Apr 2019 06:24) (117/71 - 165/77)  BP(mean): --  RR: 18 (23 Apr 2019 06:24) (18 - 21)  SpO2: 94% (23 Apr 2019 06:24) (88% - 99%)  CAPILLARY BLOOD GLUCOSE        I&O's Summary      PHYSICAL EXAM  General: Pleasant elderly woman in NAD, nasal cannula in place   HEENT: EOMI, PERRL, conjunctiva and sclera clear, nasal congestion  Neck: Supple, no JVD, normal thyroid  Chest/Lungs: Coarse breath sounds bilaterally, no wheezing  Heart: RRR, normal S1, S2, 2/6 holosystolic murmur throughout  Abdomen: Soft, nondistended, NTTP, normoactive bowel sounds  Extremities: Peripheral pulses 2+ B/L, trace pedal edema  Skin: Warm, well-perfused, no rashes or lesions  Neurological: A&Ox3, no focal     LABS:                        12.6   3.8   )-----------( 129      ( 22 Apr 2019 07:19 )             39.1     04-22    135  |  99  |  25<H>  ----------------------------<  134<H>  4.4   |  26  |  0.99    Ca    8.8      22 Apr 2019 07:19  Phos  2.9     04-22  Mg     2.1     04-21    TPro  6.7  /  Alb  3.1<L>  /  TBili  0.4  /  DBili  x   /  AST  46<H>  /  ALT  26  /  AlkPhos  138<H>  04-22    PT/INR - ( 22 Apr 2019 07:19 )   PT: 33.8 sec;   INR: 2.87 ratio         PTT - ( 22 Apr 2019 07:19 )  PTT:39.5 sec          Culture - Sputum (collected 20 Apr 2019 08:48)  Source: .Sputum CUP  Gram Stain (20 Apr 2019 14:20):    Numerous polymorphonuclear leukocytes per low power field    Moderate Squamous epithelial cells per low power field    Rare Gram Positive Rods per oil power field    Rare Gram positive cocci in pairs per oil power field  Final Report (22 Apr 2019 08:43):    Normal Respiratory Larissa present    < from: Transthoracic Echocardiogram (04.22.19 @ 23:23) >  Conclusions:  1. Mitral annular calcification, otherwise normal mitral  valve. Moderate mitral regurgitation.  2. Sclerotic aortic valve leaflets with normal opening.  Mild-moderate aortic regurgitation.  3. Increased relative wall thickness with normal left  ventricular mass index, consistent with concentric left  ventricular remodeling.  4. Normal left ventricular systolic function. No segmental  wall motion abnormalities.  5. Severe right atrial enlargement.  6. Right ventricular enlargement with decreased right  ventricular systolic function.  7. Estimated right ventricular systolic pressure equals 47  mm Hg, assuming right atrial pressure equals 8 mm Hg,  consistent with mild pulmonary hypertension.  8. Normal tricuspid valve. Severe tricuspid regurgitation.    < end of copied text >      RADIOLOGY & ADDITIONAL TESTS:    Imaging Personally Reviewed:  Consultant(s) Notes Reviewed:    Care Discussed with Consultants/Other Providers:

## 2019-04-23 NOTE — PROGRESS NOTE ADULT - PROBLEM SELECTOR PLAN 2
Cannot exclude superimposed bacterial PNA in setting of influenza and coronavirus infections.   - d/c empiric PNA treatment with azithromycin and ceftriaxone

## 2019-04-24 ENCOUNTER — TRANSCRIPTION ENCOUNTER (OUTPATIENT)
Age: 84
End: 2019-04-24

## 2019-04-24 VITALS
SYSTOLIC BLOOD PRESSURE: 159 MMHG | RESPIRATION RATE: 18 BRPM | DIASTOLIC BLOOD PRESSURE: 78 MMHG | TEMPERATURE: 99 F | HEART RATE: 69 BPM

## 2019-04-24 LAB
ALBUMIN SERPL ELPH-MCNC: 3 G/DL — LOW (ref 3.3–5)
ALP SERPL-CCNC: 124 U/L — HIGH (ref 40–120)
ALT FLD-CCNC: 45 U/L — SIGNIFICANT CHANGE UP (ref 10–45)
ANION GAP SERPL CALC-SCNC: 10 MMOL/L — SIGNIFICANT CHANGE UP (ref 5–17)
APTT BLD: 35.2 SEC — SIGNIFICANT CHANGE UP (ref 27.5–36.3)
AST SERPL-CCNC: 60 U/L — HIGH (ref 10–40)
BILIRUB SERPL-MCNC: 0.5 MG/DL — SIGNIFICANT CHANGE UP (ref 0.2–1.2)
BUN SERPL-MCNC: 30 MG/DL — HIGH (ref 7–23)
CALCIUM SERPL-MCNC: 9.1 MG/DL — SIGNIFICANT CHANGE UP (ref 8.4–10.5)
CHLORIDE SERPL-SCNC: 100 MMOL/L — SIGNIFICANT CHANGE UP (ref 96–108)
CO2 SERPL-SCNC: 28 MMOL/L — SIGNIFICANT CHANGE UP (ref 22–31)
CREAT SERPL-MCNC: 1 MG/DL — SIGNIFICANT CHANGE UP (ref 0.5–1.3)
CULTURE RESULTS: SIGNIFICANT CHANGE UP
CULTURE RESULTS: SIGNIFICANT CHANGE UP
GLUCOSE SERPL-MCNC: 90 MG/DL — SIGNIFICANT CHANGE UP (ref 70–99)
HCT VFR BLD CALC: 39.5 % — SIGNIFICANT CHANGE UP (ref 34.5–45)
HGB BLD-MCNC: 13.1 G/DL — SIGNIFICANT CHANGE UP (ref 11.5–15.5)
INR BLD: 3.21 RATIO — HIGH (ref 0.88–1.16)
MCHC RBC-ENTMCNC: 33.1 GM/DL — SIGNIFICANT CHANGE UP (ref 32–36)
MCHC RBC-ENTMCNC: 33.9 PG — SIGNIFICANT CHANGE UP (ref 27–34)
MCV RBC AUTO: 103 FL — HIGH (ref 80–100)
PHOSPHATE SERPL-MCNC: 1.8 MG/DL — LOW (ref 2.5–4.5)
PLATELET # BLD AUTO: 172 K/UL — SIGNIFICANT CHANGE UP (ref 150–400)
POTASSIUM SERPL-MCNC: 4.5 MMOL/L — SIGNIFICANT CHANGE UP (ref 3.5–5.3)
POTASSIUM SERPL-SCNC: 4.5 MMOL/L — SIGNIFICANT CHANGE UP (ref 3.5–5.3)
PROT SERPL-MCNC: 6.4 G/DL — SIGNIFICANT CHANGE UP (ref 6–8.3)
PROTHROM AB SERPL-ACNC: 37.9 SEC — HIGH (ref 10–12.9)
RBC # BLD: 3.85 M/UL — SIGNIFICANT CHANGE UP (ref 3.8–5.2)
RBC # FLD: 12.9 % — SIGNIFICANT CHANGE UP (ref 10.3–14.5)
SODIUM SERPL-SCNC: 138 MMOL/L — SIGNIFICANT CHANGE UP (ref 135–145)
SPECIMEN SOURCE: SIGNIFICANT CHANGE UP
SPECIMEN SOURCE: SIGNIFICANT CHANGE UP
WBC # BLD: 5.6 K/UL — SIGNIFICANT CHANGE UP (ref 3.8–10.5)
WBC # FLD AUTO: 5.6 K/UL — SIGNIFICANT CHANGE UP (ref 3.8–10.5)

## 2019-04-24 PROCEDURE — 71250 CT THORAX DX C-: CPT

## 2019-04-24 PROCEDURE — 96365 THER/PROPH/DIAG IV INF INIT: CPT

## 2019-04-24 PROCEDURE — 84145 PROCALCITONIN (PCT): CPT

## 2019-04-24 PROCEDURE — 86901 BLOOD TYPING SEROLOGIC RH(D): CPT

## 2019-04-24 PROCEDURE — 80048 BASIC METABOLIC PNL TOTAL CA: CPT

## 2019-04-24 PROCEDURE — 96367 TX/PROPH/DG ADDL SEQ IV INF: CPT

## 2019-04-24 PROCEDURE — 87633 RESP VIRUS 12-25 TARGETS: CPT

## 2019-04-24 PROCEDURE — 87040 BLOOD CULTURE FOR BACTERIA: CPT

## 2019-04-24 PROCEDURE — 82947 ASSAY GLUCOSE BLOOD QUANT: CPT

## 2019-04-24 PROCEDURE — 93971 EXTREMITY STUDY: CPT

## 2019-04-24 PROCEDURE — 87086 URINE CULTURE/COLONY COUNT: CPT

## 2019-04-24 PROCEDURE — 85610 PROTHROMBIN TIME: CPT

## 2019-04-24 PROCEDURE — 93005 ELECTROCARDIOGRAM TRACING: CPT

## 2019-04-24 PROCEDURE — 82435 ASSAY OF BLOOD CHLORIDE: CPT

## 2019-04-24 PROCEDURE — 85730 THROMBOPLASTIN TIME PARTIAL: CPT

## 2019-04-24 PROCEDURE — 83735 ASSAY OF MAGNESIUM: CPT

## 2019-04-24 PROCEDURE — 85027 COMPLETE CBC AUTOMATED: CPT

## 2019-04-24 PROCEDURE — 87581 M.PNEUMON DNA AMP PROBE: CPT

## 2019-04-24 PROCEDURE — 84295 ASSAY OF SERUM SODIUM: CPT

## 2019-04-24 PROCEDURE — 87486 CHLMYD PNEUM DNA AMP PROBE: CPT

## 2019-04-24 PROCEDURE — 81001 URINALYSIS AUTO W/SCOPE: CPT

## 2019-04-24 PROCEDURE — 85014 HEMATOCRIT: CPT

## 2019-04-24 PROCEDURE — 82330 ASSAY OF CALCIUM: CPT

## 2019-04-24 PROCEDURE — 94640 AIRWAY INHALATION TREATMENT: CPT

## 2019-04-24 PROCEDURE — 87070 CULTURE OTHR SPECIMN AEROBIC: CPT

## 2019-04-24 PROCEDURE — 84132 ASSAY OF SERUM POTASSIUM: CPT

## 2019-04-24 PROCEDURE — 93306 TTE W/DOPPLER COMPLETE: CPT

## 2019-04-24 PROCEDURE — 86900 BLOOD TYPING SEROLOGIC ABO: CPT

## 2019-04-24 PROCEDURE — 80053 COMPREHEN METABOLIC PANEL: CPT

## 2019-04-24 PROCEDURE — 87449 NOS EACH ORGANISM AG IA: CPT

## 2019-04-24 PROCEDURE — 71045 X-RAY EXAM CHEST 1 VIEW: CPT

## 2019-04-24 PROCEDURE — 84100 ASSAY OF PHOSPHORUS: CPT

## 2019-04-24 PROCEDURE — 83605 ASSAY OF LACTIC ACID: CPT

## 2019-04-24 PROCEDURE — 83880 ASSAY OF NATRIURETIC PEPTIDE: CPT

## 2019-04-24 PROCEDURE — 97161 PT EVAL LOW COMPLEX 20 MIN: CPT

## 2019-04-24 PROCEDURE — 87798 DETECT AGENT NOS DNA AMP: CPT

## 2019-04-24 PROCEDURE — 99285 EMERGENCY DEPT VISIT HI MDM: CPT | Mod: 25

## 2019-04-24 PROCEDURE — 86850 RBC ANTIBODY SCREEN: CPT

## 2019-04-24 PROCEDURE — 99239 HOSP IP/OBS DSCHRG MGMT >30: CPT

## 2019-04-24 PROCEDURE — 82803 BLOOD GASES ANY COMBINATION: CPT

## 2019-04-24 RX ORDER — LOSARTAN POTASSIUM 100 MG/1
1 TABLET, FILM COATED ORAL
Qty: 0 | Refills: 0 | COMMUNITY

## 2019-04-24 RX ORDER — LOSARTAN POTASSIUM 100 MG/1
1 TABLET, FILM COATED ORAL
Qty: 0 | Refills: 0 | COMMUNITY
Start: 2019-04-24

## 2019-04-24 RX ORDER — WARFARIN SODIUM 2.5 MG/1
1 TABLET ORAL
Qty: 0 | Refills: 0 | COMMUNITY

## 2019-04-24 RX ORDER — SODIUM,POTASSIUM PHOSPHATES 278-250MG
1 POWDER IN PACKET (EA) ORAL ONCE
Qty: 0 | Refills: 0 | Status: COMPLETED | OUTPATIENT
Start: 2019-04-24 | End: 2019-04-24

## 2019-04-24 RX ADMIN — Medication 1 TABLET(S): at 11:52

## 2019-04-24 RX ADMIN — Medication 100 MILLIGRAM(S): at 06:53

## 2019-04-24 RX ADMIN — Medication 3 MILLILITER(S): at 11:21

## 2019-04-24 RX ADMIN — Medication 3 MILLILITER(S): at 06:25

## 2019-04-24 RX ADMIN — LOSARTAN POTASSIUM 25 MILLIGRAM(S): 100 TABLET, FILM COATED ORAL at 06:53

## 2019-04-24 RX ADMIN — Medication 10 MILLIGRAM(S): at 06:53

## 2019-04-24 RX ADMIN — Medication 50 MILLIGRAM(S): at 06:54

## 2019-04-24 RX ADMIN — Medication 100 MILLIGRAM(S): at 13:56

## 2019-04-24 RX ADMIN — Medication 1 PACKET(S): at 11:52

## 2019-04-24 NOTE — PROGRESS NOTE ADULT - SUBJECTIVE AND OBJECTIVE BOX
Authored by Dr John Stephens [phone] [facility] / [id number] [facility]    Patient is a 89y old  Female who presents with a chief complaint of Cough (23 Apr 2019 07:00)    SUBJECTIVE / OVERNIGHT EVENTS: No acute events overnight    This morning pt is resting comfortably in bed on RA. She reports improved cough and no SOB, fever/chills or CP    MEDICATIONS  (STANDING):  ALBUTerol/ipratropium for Nebulization 3 milliLiter(s) Nebulizer every 6 hours  atorvastatin 10 milliGRAM(s) Oral at bedtime  benzonatate 100 milliGRAM(s) Oral every 8 hours  calcium carbonate   1250 mG (OsCal) 1 Tablet(s) Oral daily  losartan 25 milliGRAM(s) Oral daily  metoprolol succinate ER 50 milliGRAM(s) Oral daily  multivitamin 1 Tablet(s) Oral daily  torsemide 10 milliGRAM(s) Oral daily    MEDICATIONS  (PRN):  acetaminophen   Tablet .. 650 milliGRAM(s) Oral every 6 hours PRN Temp greater or equal to 38C (100.4F), Mild Pain (1 - 3)  artificial  tears Solution 1 Drop(s) Both EYES two times a day PRN Dry Eyes  guaiFENesin    Syrup 200 milliGRAM(s) Oral every 6 hours PRN Cough      Vital Signs Last 24 Hrs  T(C): 37 (24 Apr 2019 14:16), Max: 37 (24 Apr 2019 14:16)  T(F): 98.6 (24 Apr 2019 14:16), Max: 98.6 (24 Apr 2019 14:16)  HR: 69 (24 Apr 2019 14:16) (67 - 89)  BP: 159/78 (24 Apr 2019 14:16) (153/65 - 161/72)  BP(mean): --  RR: 18 (24 Apr 2019 14:16) (18 - 18)  SpO2: 94% (24 Apr 2019 11:22) (90% - 99%)  CAPILLARY BLOOD GLUCOSE        I&O's Summary      PHYSICAL EXAM  General: Pleasant elderly woman in NAD,   HEENT: EOMI, PERRL, conjunctiva and sclera clear, nasal congestion  Neck: Supple, no JVD, normal thyroid  Chest/Lungs: CTA bilaterally, no wheezing  Heart: RRR, normal S1, S2, 2/6 holosystolic murmur throughout  Abdomen: Soft, nondistended, NTTP, normoactive bowel sounds  Extremities: Peripheral pulses 2+ B/L, trace pedal edema  Skin: Warm, well-perfused, no rashes or lesions  Neurological: A&Ox3, no focal     LABS:                        13.1   5.6   )-----------( 172      ( 24 Apr 2019 06:53 )             39.5     04-24    138  |  100  |  30<H>  ----------------------------<  90  4.5   |  28  |  1.00    Ca    9.1      24 Apr 2019 06:53  Phos  1.8     04-24    TPro  6.4  /  Alb  3.0<L>  /  TBili  0.5  /  DBili  x   /  AST  60<H>  /  ALT  45  /  AlkPhos  124<H>  04-24    PT/INR - ( 24 Apr 2019 06:53 )   PT: 37.9 sec;   INR: 3.21 ratio         PTT - ( 24 Apr 2019 06:53 )  PTT:35.2 sec            RADIOLOGY & ADDITIONAL TESTS:    Imaging Personally Reviewed:  Consultant(s) Notes Reviewed:    Care Discussed with Consultants/Other Providers:

## 2019-04-24 NOTE — PROGRESS NOTE ADULT - ATTENDING COMMENTS
Pt on day 5 of tamiflu, with minimal O2 requirement. Will check O2 sat on ambulation on RA if required for home O2.  PT recommended NIDHI but pt prefers home with home care - CM on board  DC planning    Erin Conway MD  Division of Hospital Medicine  Pager: [phone]  Office: [phone]
Pt presents with sepsis 2/2 influenza s/p completion of tamiflu with clinical improvement, now in no acute respiratory distress on RA. Pt is medically stable for discharge with home care. Pt in agreement with dc planning. All questions and concerns were addressed.    46 minutes spent on discharge process    Erin Conway MD  Division of Hospital Medicine  Pager: [phone]  Office: [phone]
89F with sepsis 2/2 influenza and rhinovirus, started on tamiflu  Initially on ceftriaxone/azithromycin as concern for superimposed PNA, but CT chest negative. DC abx  Pt clinically improving with low O2 requirement  PT consult pending    Erin Conway MD  Division of Hospital Medicine  Pager: [phone]  Office: [phone]

## 2019-04-24 NOTE — PROGRESS NOTE ADULT - PROBLEM SELECTOR PLAN 8
-Renally dose meds  -avoid nephrotoxins  -monitor lytes.
- Renally dose medications  - Avoid nephrotoxins  - Monitor electrolytes

## 2019-04-24 NOTE — DISCHARGE NOTE NURSING/CASE MANAGEMENT/SOCIAL WORK - NSDCDPATPORTLINK_GEN_ALL_CORE
You can access the [facility] Patient Portal, offered by [facility], by registering with the following website: [email or web address]

## 2019-04-24 NOTE — PROGRESS NOTE ADULT - PROBLEM SELECTOR PLAN 4
Pt presenting with fevers and tachypnea in the setting of flu positive, coronavirus positive and likely bacterial pneumonia c/w sepsis. Lactate 2.6 on admission.   - No further IVF given underlying CHF, s/p 1L in ED  - F/u UCx, BCx: NGTD

## 2019-04-24 NOTE — PROGRESS NOTE ADULT - ASSESSMENT
89F PMH HTN, HLD, Afib on coumadin, presenting with sepsis 2/2 to influenza and rhinovirus
89F with PMH HTN, HLD, Afib on coumadin, and CHF (no prior echo on file) presenting with sepsis 2/2 influenza and rhinovirus infection.
89F with PMH HTN, HLD, Afib on coumadin, and CHF (no prior echo on file) presenting with sepsis 2/2 influenza and rhinovirus infection.
89F with PMH HTN, HLD, Afib on coumadin, and Pulm HTN presenting with sepsis 2/2 influenza and rhinovirus infection.
89F with PMH HTN, HLD, Afib on coumadin, and Pulm HTN presenting with sepsis 2/2 influenza and rhinovirus infection.

## 2019-04-24 NOTE — PROGRESS NOTE ADULT - PROBLEM SELECTOR PLAN 1
Resolved. Pt presenting with acute desaturations in setting of bronchiectasis and atelectasis, cannot r/o PNA given +influenza and rhinovirus. Requiring supplemental O2 with O2 saturations in 90s.   - Continue oxygen supplementation to maintain SpO2 >92%  - DuoNebs Q6  - Chest PT, incentive spirometry  - completed prednisone 30mg daily for continued O2 requirement and rhonchi on exam

## 2019-04-24 NOTE — DISCHARGE NOTE NURSING/CASE MANAGEMENT/SOCIAL WORK - NSDCPEPTCOWAR_GEN_ALL_CORE
Coumadin/Warfarin - Potential for adverse drug reactions and interactions/Coumadin/Warfarin - Dietary Advice/Coumadin/Warfarin - Compliance/Coumadin/Warfarin - Follow up monitoring

## 2019-04-24 NOTE — PROGRESS NOTE ADULT - PROBLEM SELECTOR PLAN 9
- Patient who full decision making capacity and insight into her disease process reports she would not want to be in a vegetative state if something happens. Discussed measures such as CPR and/or intubation and she would like a trial of these measures but if the outcome was that she would be in a  prolonged vegetative state she would not want these measures. Son, Girma is proxy and he reports he supports this decision. Patient is Full Code.
Patient with full decision making capacity and insight into her disease process. Discussed measures such as CPR and intubation, and patient stating she would like a trial of these measures. If outcome were that she would be in vegetative state or dependent upon life support, she would not want these interventions any longer. Son, Girma is HCP and he is aware of patient's wishes. Patient is Full Code.

## 2019-04-24 NOTE — PROGRESS NOTE ADULT - PROBLEM SELECTOR PROBLEM 2
R/O Pneumonia, bacterial

## 2019-04-24 NOTE — PROGRESS NOTE ADULT - PROBLEM SELECTOR PLAN 5
-Total tim mildly elevated along with AST. However she has no findings to support acute cholecystitis or acute cholangitis. Can consider abdominal RUQ u/s outpatient unless clinically there is a change while in hospital or her LFT uptrends.  -If this is from statin therapy and she continues to have uptrending enzymes then one consideration is statin hepatotoxicity. In which case we will have to d.c the statin. For now will continue to monitor as the elevation is very minor.
Mild elevation of bilirubin and AST likely in setting of acute infection. Less likely from statin.   - Continue to monitor CMP

## 2019-04-24 NOTE — PROGRESS NOTE ADULT - PROBLEM SELECTOR PLAN 7
BP within normal range.   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  - BNP 6406  - Normal LVEF as per cardiology documentation: mitral valve prolapse with moderate mitral regurgitation  - Resume home torsemide to avoid CHF exacerbation

## 2019-04-24 NOTE — PROGRESS NOTE ADULT - PROBLEM SELECTOR PROBLEM 5
Elevated liver enzymes

## 2019-04-24 NOTE — PROGRESS NOTE ADULT - PROBLEM SELECTOR PLAN 3
-c/w tamiflu but renally dose medication.
- Continue renally-dosed Tamiflu for total of 5 days
- completed renally-dosed Tamiflu for total of 5 days

## 2019-04-25 RX ORDER — WARFARIN SODIUM 2.5 MG/1
1 TABLET ORAL
Qty: 0 | Refills: 0 | DISCHARGE
Start: 2019-04-25

## 2019-04-25 RX ORDER — WARFARIN SODIUM 2.5 MG/1
1 TABLET ORAL
Qty: 0 | Refills: 0 | COMMUNITY
Start: 2019-04-25

## 2019-04-26 ENCOUNTER — APPOINTMENT (OUTPATIENT)
Dept: CARDIOLOGY | Facility: CLINIC | Age: 84
End: 2019-04-26
Payer: MEDICARE

## 2019-04-26 VITALS — OXYGEN SATURATION: 99 % | SYSTOLIC BLOOD PRESSURE: 140 MMHG | DIASTOLIC BLOOD PRESSURE: 70 MMHG | HEART RATE: 72 BPM

## 2019-04-26 PROCEDURE — 85610 PROTHROMBIN TIME: CPT | Mod: QW

## 2019-04-26 PROCEDURE — 93793 ANTICOAG MGMT PT WARFARIN: CPT

## 2019-04-27 RX ORDER — WARFARIN SODIUM 2.5 MG/1
1 TABLET ORAL
Qty: 0 | Refills: 0 | COMMUNITY
Start: 2019-04-27

## 2019-05-01 ENCOUNTER — LABORATORY RESULT (OUTPATIENT)
Age: 84
End: 2019-05-01

## 2019-05-06 ENCOUNTER — CHART COPY (OUTPATIENT)
Age: 84
End: 2019-05-06

## 2019-05-15 ENCOUNTER — LABORATORY RESULT (OUTPATIENT)
Age: 84
End: 2019-05-15

## 2019-05-29 ENCOUNTER — RESULT REVIEW (OUTPATIENT)
Age: 84
End: 2019-05-29

## 2019-05-29 ENCOUNTER — LABORATORY RESULT (OUTPATIENT)
Age: 84
End: 2019-05-29

## 2019-06-12 ENCOUNTER — LABORATORY RESULT (OUTPATIENT)
Age: 84
End: 2019-06-12

## 2019-06-17 ENCOUNTER — APPOINTMENT (OUTPATIENT)
Dept: CARDIOLOGY | Facility: CLINIC | Age: 84
End: 2019-06-17
Payer: MEDICARE

## 2019-06-17 ENCOUNTER — RX RENEWAL (OUTPATIENT)
Age: 84
End: 2019-06-17

## 2019-06-17 ENCOUNTER — NON-APPOINTMENT (OUTPATIENT)
Age: 84
End: 2019-06-17

## 2019-06-17 VITALS
WEIGHT: 127 LBS | SYSTOLIC BLOOD PRESSURE: 147 MMHG | BODY MASS INDEX: 20.41 KG/M2 | HEIGHT: 66 IN | HEART RATE: 65 BPM | DIASTOLIC BLOOD PRESSURE: 66 MMHG | OXYGEN SATURATION: 90 %

## 2019-06-17 PROCEDURE — 93000 ELECTROCARDIOGRAM COMPLETE: CPT

## 2019-06-17 PROCEDURE — 99214 OFFICE O/P EST MOD 30 MIN: CPT

## 2019-06-17 RX ORDER — CARVEDILOL 3.12 MG/1
3.12 TABLET, FILM COATED ORAL
Refills: 0 | Status: ACTIVE | COMMUNITY
Start: 2019-06-17

## 2019-06-17 NOTE — HISTORY OF PRESENT ILLNESS
[FreeTextEntry1] : Mrs. Eric presents in Scheduled f/u.  In Aprill, she was hospitalized at [place] with hypoxic respiratory failure, virus positive.  Transthoracic echo revealed normal left ventricular systolic function with mild mitral regurgitation and mild to moderate aortic sufficiency with eccentric left ventricular remodeling. She believes that she is now "back to myself.  Reports general fatigue and tiredness somewhat easily but tolerates will test of daily living without any specific effort provoked symptoms.\par No c/o chest, throat,jaw, arm or upper back discomfort.  Transient dyspnea with bending. No orthopnea or PND.  No palpitations, dizziness or syncope.  No claudication.\par \par \par

## 2019-06-27 LAB
INR PPP: 2.08 RATIO
PT BLD: 24.5 SEC

## 2019-07-10 ENCOUNTER — LABORATORY RESULT (OUTPATIENT)
Age: 84
End: 2019-07-10

## 2019-07-24 ENCOUNTER — LABORATORY RESULT (OUTPATIENT)
Age: 84
End: 2019-07-24

## 2019-07-26 ENCOUNTER — MEDICATION RENEWAL (OUTPATIENT)
Age: 84
End: 2019-07-26

## 2019-08-08 LAB
INR PPP: 1.99 RATIO
PT BLD: 23.4 SEC

## 2019-08-21 ENCOUNTER — LABORATORY RESULT (OUTPATIENT)
Age: 84
End: 2019-08-21

## 2019-09-04 ENCOUNTER — LABORATORY RESULT (OUTPATIENT)
Age: 84
End: 2019-09-04

## 2019-09-18 ENCOUNTER — LABORATORY RESULT (OUTPATIENT)
Age: 84
End: 2019-09-18

## 2019-09-23 ENCOUNTER — APPOINTMENT (OUTPATIENT)
Dept: CARDIOLOGY | Facility: CLINIC | Age: 84
End: 2019-09-23
Payer: MEDICARE

## 2019-09-23 ENCOUNTER — NON-APPOINTMENT (OUTPATIENT)
Age: 84
End: 2019-09-23

## 2019-09-23 VITALS
DIASTOLIC BLOOD PRESSURE: 65 MMHG | WEIGHT: 126 LBS | SYSTOLIC BLOOD PRESSURE: 166 MMHG | HEIGHT: 66 IN | HEART RATE: 68 BPM | OXYGEN SATURATION: 93 % | BODY MASS INDEX: 20.25 KG/M2

## 2019-09-23 VITALS — DIASTOLIC BLOOD PRESSURE: 54 MMHG | SYSTOLIC BLOOD PRESSURE: 120 MMHG

## 2019-09-23 PROCEDURE — 99214 OFFICE O/P EST MOD 30 MIN: CPT

## 2019-09-23 PROCEDURE — 93000 ELECTROCARDIOGRAM COMPLETE: CPT

## 2019-09-23 NOTE — HISTORY OF PRESENT ILLNESS
[FreeTextEntry1] : Mrs. Eric presents in Scheduled f/u reporting that he has been feeling generally well.  She reports no specific symptoms, generalized fatigue which she attributes to "aging"..  No c/o chest, throat,jaw, arm or upper back discomfort.  No dyspnea, orthopnea or PND.  No palpitations, dizziness or syncope.  No edema or claudication.\par \par \par

## 2019-10-02 ENCOUNTER — LABORATORY RESULT (OUTPATIENT)
Age: 84
End: 2019-10-02

## 2019-10-16 ENCOUNTER — LABORATORY RESULT (OUTPATIENT)
Age: 84
End: 2019-10-16

## 2019-10-30 ENCOUNTER — LABORATORY RESULT (OUTPATIENT)
Age: 84
End: 2019-10-30

## 2019-11-13 ENCOUNTER — RESULT REVIEW (OUTPATIENT)
Age: 84
End: 2019-11-13

## 2019-11-13 ENCOUNTER — LABORATORY RESULT (OUTPATIENT)
Age: 84
End: 2019-11-13

## 2019-11-27 ENCOUNTER — LABORATORY RESULT (OUTPATIENT)
Age: 84
End: 2019-11-27

## 2019-12-09 ENCOUNTER — MEDICATION RENEWAL (OUTPATIENT)
Age: 84
End: 2019-12-09

## 2019-12-10 ENCOUNTER — MOBILE ON CALL (OUTPATIENT)
Age: 84
End: 2019-12-10

## 2019-12-11 ENCOUNTER — LABORATORY RESULT (OUTPATIENT)
Age: 84
End: 2019-12-11

## 2019-12-12 ENCOUNTER — MOBILE ON CALL (OUTPATIENT)
Age: 84
End: 2019-12-12

## 2019-12-26 ENCOUNTER — LABORATORY RESULT (OUTPATIENT)
Age: 84
End: 2019-12-26

## 2019-12-30 ENCOUNTER — MEDICATION RENEWAL (OUTPATIENT)
Age: 84
End: 2019-12-30

## 2020-01-01 ENCOUNTER — NON-APPOINTMENT (OUTPATIENT)
Age: 85
End: 2020-01-01

## 2020-01-01 ENCOUNTER — APPOINTMENT (OUTPATIENT)
Dept: CARDIOLOGY | Facility: CLINIC | Age: 85
End: 2020-01-01
Payer: MEDICARE

## 2020-01-01 VITALS
BODY MASS INDEX: 19.93 KG/M2 | TEMPERATURE: 97.6 F | WEIGHT: 124 LBS | DIASTOLIC BLOOD PRESSURE: 68 MMHG | OXYGEN SATURATION: 94 % | HEIGHT: 66 IN | SYSTOLIC BLOOD PRESSURE: 166 MMHG | RESPIRATION RATE: 17 BRPM | HEART RATE: 59 BPM

## 2020-01-01 VITALS
DIASTOLIC BLOOD PRESSURE: 64 MMHG | SYSTOLIC BLOOD PRESSURE: 158 MMHG | WEIGHT: 129 LBS | HEART RATE: 70 BPM | HEIGHT: 66 IN | OXYGEN SATURATION: 99 % | BODY MASS INDEX: 20.73 KG/M2

## 2020-01-01 VITALS
SYSTOLIC BLOOD PRESSURE: 140 MMHG | BODY MASS INDEX: 18.48 KG/M2 | DIASTOLIC BLOOD PRESSURE: 70 MMHG | HEART RATE: 61 BPM | WEIGHT: 115 LBS | TEMPERATURE: 97.3 F | HEIGHT: 66 IN

## 2020-01-01 VITALS — DIASTOLIC BLOOD PRESSURE: 62 MMHG | SYSTOLIC BLOOD PRESSURE: 148 MMHG

## 2020-01-01 DIAGNOSIS — E78.5 HYPERLIPIDEMIA, UNSPECIFIED: ICD-10-CM

## 2020-01-01 DIAGNOSIS — I35.1 NONRHEUMATIC AORTIC (VALVE) INSUFFICIENCY: ICD-10-CM

## 2020-01-01 DIAGNOSIS — R00.1 BRADYCARDIA, UNSPECIFIED: ICD-10-CM

## 2020-01-01 DIAGNOSIS — I10 ESSENTIAL (PRIMARY) HYPERTENSION: ICD-10-CM

## 2020-01-01 LAB
ALBUMIN SERPL ELPH-MCNC: 3.9 G/DL
ALP BLD-CCNC: 153 U/L
ALT SERPL-CCNC: 16 U/L
ANION GAP SERPL CALC-SCNC: 12 MMOL/L
AST SERPL-CCNC: 31 U/L
BASOPHILS # BLD AUTO: 0.06 K/UL
BASOPHILS NFR BLD AUTO: 1.3 %
BILIRUB SERPL-MCNC: 1.7 MG/DL
BUN SERPL-MCNC: 28 MG/DL
CALCIUM SERPL-MCNC: 10 MG/DL
CHLORIDE SERPL-SCNC: 97 MMOL/L
CHOLEST SERPL-MCNC: 94 MG/DL
CHOLEST/HDLC SERPL: 2.3 RATIO
CO2 SERPL-SCNC: 29 MMOL/L
CREAT SERPL-MCNC: 1.12 MG/DL
EOSINOPHIL # BLD AUTO: 0.21 K/UL
EOSINOPHIL NFR BLD AUTO: 4.6 %
ESTIMATED AVERAGE GLUCOSE: 126 MG/DL
GLUCOSE SERPL-MCNC: 98 MG/DL
HBA1C MFR BLD HPLC: 6 %
HCT VFR BLD CALC: 42.7 %
HDLC SERPL-MCNC: 42 MG/DL
HGB BLD-MCNC: 13.2 G/DL
IMM GRANULOCYTES NFR BLD AUTO: 0.2 %
INR PPP: 1.33 RATIO
INR PPP: 1.33 RATIO
INR PPP: 1.36 RATIO
INR PPP: 1.42 RATIO
INR PPP: 1.45 RATIO
INR PPP: 1.6 RATIO
INR PPP: 1.68 RATIO
INR PPP: 1.9 RATIO
INR PPP: 2.12 RATIO
INR PPP: 2.4 RATIO
INR PPP: 2.5 RATIO
INR PPP: 2.56 RATIO
INR PPP: 2.8 RATIO
INR PPP: 3.86 RATIO
INR PPP: 4.13 RATIO
INR PPP: 4.27 RATIO
INR PPP: 4.77 RATIO
INR PPP: 5.29 RATIO
LDLC SERPL CALC-MCNC: 40 MG/DL
LDLC SERPL DIRECT ASSAY-MCNC: 47 MG/DL
LYMPHOCYTES # BLD AUTO: 0.98 K/UL
LYMPHOCYTES NFR BLD AUTO: 21.6 %
MAGNESIUM SERPL-MCNC: 2.1 MG/DL
MAN DIFF?: NORMAL
MCHC RBC-ENTMCNC: 30.9 GM/DL
MCHC RBC-ENTMCNC: 32 PG
MCV RBC AUTO: 103.6 FL
MONOCYTES # BLD AUTO: 0.46 K/UL
MONOCYTES NFR BLD AUTO: 10.2 %
NEUTROPHILS # BLD AUTO: 2.81 K/UL
NEUTROPHILS NFR BLD AUTO: 62.1 %
NT-PROBNP SERPL-MCNC: 1136 PG/ML
PLATELET # BLD AUTO: 167 K/UL
POTASSIUM SERPL-SCNC: 5 MMOL/L
PROT SERPL-MCNC: 7.3 G/DL
PT BLD: 15.3 SEC
PT BLD: 15.5 SEC
PT BLD: 15.9 SEC
PT BLD: 16.5 SEC
PT BLD: 16.9 SEC
PT BLD: 18.5 SEC
PT BLD: 19.5 SEC
PT BLD: 24.3 SEC
PT BLD: 27.4 SEC
PT BLD: 28.5 SEC
PT BLD: 29.1 SEC
PT BLD: 33.3 SEC
PT BLD: 42.7 SEC
PT BLD: 45.5 SEC
PT BLD: 47 SEC
PT BLD: 52.3 SEC
PT BLD: 64 SEC
RBC # BLD: 4.12 M/UL
RBC # FLD: 15.3 %
SODIUM SERPL-SCNC: 138 MMOL/L
TRIGL SERPL-MCNC: 61 MG/DL
TSH SERPL-ACNC: 3.77 UIU/ML
WBC # FLD AUTO: 4.53 K/UL

## 2020-01-01 PROCEDURE — 99215 OFFICE O/P EST HI 40 MIN: CPT

## 2020-01-01 PROCEDURE — 93000 ELECTROCARDIOGRAM COMPLETE: CPT

## 2020-01-01 PROCEDURE — 36415 COLL VENOUS BLD VENIPUNCTURE: CPT

## 2020-01-01 PROCEDURE — 99213 OFFICE O/P EST LOW 20 MIN: CPT

## 2020-01-01 PROCEDURE — 99214 OFFICE O/P EST MOD 30 MIN: CPT

## 2020-01-01 RX ORDER — WARFARIN 2.5 MG/1
2.5 TABLET ORAL
Qty: 90 | Refills: 2 | Status: DISCONTINUED | COMMUNITY
Start: 2019-12-10 | End: 2020-01-01

## 2020-01-08 ENCOUNTER — LABORATORY RESULT (OUTPATIENT)
Age: 85
End: 2020-01-08

## 2020-01-26 LAB
INR PPP: 2.3 RATIO
PT BLD: 27.2 SEC

## 2020-01-27 ENCOUNTER — APPOINTMENT (OUTPATIENT)
Dept: CARDIOLOGY | Facility: CLINIC | Age: 85
End: 2020-01-27
Payer: MEDICARE

## 2020-01-27 ENCOUNTER — NON-APPOINTMENT (OUTPATIENT)
Age: 85
End: 2020-01-27

## 2020-01-27 VITALS
DIASTOLIC BLOOD PRESSURE: 62 MMHG | TEMPERATURE: 98.1 F | OXYGEN SATURATION: 92 % | BODY MASS INDEX: 19.44 KG/M2 | HEART RATE: 62 BPM | HEIGHT: 66 IN | RESPIRATION RATE: 17 BRPM | SYSTOLIC BLOOD PRESSURE: 126 MMHG | WEIGHT: 121 LBS

## 2020-01-27 PROCEDURE — 93306 TTE W/DOPPLER COMPLETE: CPT

## 2020-01-27 PROCEDURE — 99214 OFFICE O/P EST MOD 30 MIN: CPT

## 2020-01-27 PROCEDURE — 93000 ELECTROCARDIOGRAM COMPLETE: CPT

## 2020-01-27 NOTE — HISTORY OF PRESENT ILLNESS
[FreeTextEntry1] : Mrs. Eric presents in Scheduled f/u reporting that he has been feeling generally well.  She reports no specific symptoms. Generalized fatigue in late afternoon but tolerates all activities of daily living without any specific effort provoked no c/o chest, throat,jaw, arm or upper back discomfort.  No dyspnea, orthopnea or PND.  No palpitations, dizziness or syncope.  No edema or claudication.\par \par Lost weight.  Appetite is good but she has restricted her desserts voluntarily in favor of fresh fruit.  Bowels are normal.\par \par No anticoagulation related bleeding problems.\par \par Dr. Miranda found a blood pressure is slightly elevated.  Increase losartan to 75 mg daily and readings at home have been normal subsequent to the change.

## 2020-02-05 LAB
INR PPP: 2.9 RATIO
PT BLD: 33.9 SEC

## 2020-02-19 LAB
INR PPP: 2.91 RATIO
PT BLD: 34 SEC

## 2020-03-04 LAB
INR PPP: 2.66 RATIO
PT BLD: 31.2 SEC

## 2020-03-18 LAB
INR PPP: 3.17 RATIO
PT BLD: 37.5 SEC

## 2020-04-01 LAB
INR PPP: 3.27 RATIO
PT BLD: 39 SEC

## 2020-04-16 LAB
INR PPP: 2.13 RATIO
PT BLD: 24.9 SEC

## 2020-05-11 NOTE — ED ADULT NURSE NOTE - CCCP TRG CHIEF CMPLNT
weakness Solaraze Pregnancy And Lactation Text: This medication is Pregnancy Category B and is considered safe. There is some data to suggest avoiding during the third trimester. It is unknown if this medication is excreted in breast milk.

## 2020-06-08 NOTE — HISTORY OF PRESENT ILLNESS
[FreeTextEntry1] : Mrs. Eric presents in scheduled f/u reporting that he has been feeling generally well, but reports symmetrical bilateral edema.  She has been ordering a more prepared food during the pandemic and since she has gotten to cook at home more and restrict her sodium intake the swelling has improved.  Nevertheless shoes are ill fitting as the day progresses and legs are uncomfortable.  Elevates, but nominally.  Weight has increased.  No orthopnea and sleeps comfortably on one pillow.  She does report some random episodes of dyspnea lasting perhaps a few minutes and unrelated to activity.  No associated chest discomfort or palpitations at these times.\par \par Fell approximately 1 week ago after stumbling on a quilt (for the second time) landed on her left arm.  She required assistance to arise.  No head trauma.. \par \par No anticoagulation related bleeding problems.\par

## 2020-07-13 PROBLEM — I35.1 MILD AORTIC INSUFFICIENCY: Status: ACTIVE | Noted: 2017-08-28

## 2020-07-13 NOTE — HISTORY OF PRESENT ILLNESS
[FreeTextEntry1] : Mrs. Eric presents in scheduled f/u reporting that he has been feeling generally well.  Edema has improved somewhat with elevation and she just purchased zippered compression stockings (20 to 30 mm).  No additional falls.\par \par No c/o chest, throat,jaw, arm or upper back discomfort.  No dyspnea, orthopnea or PND.  No palpitations, dizziness or syncope.  No  claudication.\par \par No anticoagulation related bleeding problems.\par \par Just celebrated her 89th birthday!\par

## 2020-07-13 NOTE — REASON FOR VISIT
[Hyperlipidemia] : hyperlipidemia [Mitral Regurgitation] : mitral regurgitation [Hypertension] : hypertension [FreeTextEntry1] : edema

## 2020-10-21 NOTE — HISTORY OF PRESENT ILLNESS
[FreeTextEntry1] : Mrs. Eric presents in scheduled f/u reporting that\par Appetite she continues to lose weight.  She saw Dr. Miranda concerned about right greater than left edema with weeping of her feet.  He increased torsemide to 20 mg daily with improvement.  She struggles with elevating her legs because she finds it uncomfortable.\par \par However, she remains variably dyspneic, typically with getting out of bed, walking and talking walking modest distances or bending.  No associated chest distress.  Also reported vague abdominal distress and explosive bowel movements.  The latter seem to have stopped spontaneously.  Evaluation for these symptoms  included: \par -Negative venous duplex Doppler of the left lower extremity.\par -Negative abdominal CT and ultrasound\par -BNP ~2,200.\par -CT of the chest without contrast (10/20/2020): Chronic obstructive pulmonary disease without advanced emphysematous changes.  Bilateral opacities remain represent extensive mucus plugging involving all lobes bilaterally.  There is associated bronchial wall thickening but no suspicious dominant lung nodules.  There was moderate pleural-parenchymal fibrosis involving both apices with bronchiectatic changes.  Mild thickening of the interlobular septal at the lung bases associated with architectural distortion compatible with mild usual interstitial pneumonitis pattern of chronic fibrosing interstitial lung disease.  A 5.2 x 2.5 cm right pericardial cyst versus loculated fluid was described as well as fluid in the pericardial recesses and minimally along the anterior pericardium.  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  No dyspnea, orthopnea or PND.  No palpitations, dizziness or syncope.  No  claudication.\par \par No anticoagulation related bleeding problems.\par \par Just celebrated her 89th birthday!\par

## 2021-01-01 ENCOUNTER — RX RENEWAL (OUTPATIENT)
Age: 86
End: 2021-01-01

## 2021-01-01 ENCOUNTER — INPATIENT (INPATIENT)
Facility: HOSPITAL | Age: 86
LOS: 6 days | Discharge: NURSING, REHAB OR LONG-TERM CARE | DRG: 291 | End: 2021-03-25
Attending: HOSPITALIST | Admitting: HOSPITALIST
Payer: MEDICARE

## 2021-01-01 ENCOUNTER — NON-APPOINTMENT (OUTPATIENT)
Age: 86
End: 2021-01-01

## 2021-01-01 ENCOUNTER — LABORATORY RESULT (OUTPATIENT)
Age: 86
End: 2021-01-01

## 2021-01-01 ENCOUNTER — APPOINTMENT (OUTPATIENT)
Dept: CARDIOLOGY | Facility: CLINIC | Age: 86
End: 2021-01-01
Payer: MEDICARE

## 2021-01-01 ENCOUNTER — TRANSCRIPTION ENCOUNTER (OUTPATIENT)
Age: 86
End: 2021-01-01

## 2021-01-01 VITALS
TEMPERATURE: 98 F | SYSTOLIC BLOOD PRESSURE: 120 MMHG | DIASTOLIC BLOOD PRESSURE: 67 MMHG | OXYGEN SATURATION: 95 % | HEART RATE: 82 BPM | RESPIRATION RATE: 17 BRPM

## 2021-01-01 VITALS
HEART RATE: 57 BPM | TEMPERATURE: 96.2 F | OXYGEN SATURATION: 92 % | BODY MASS INDEX: 18.96 KG/M2 | HEIGHT: 66 IN | DIASTOLIC BLOOD PRESSURE: 74 MMHG | RESPIRATION RATE: 16 BRPM | WEIGHT: 118 LBS | SYSTOLIC BLOOD PRESSURE: 148 MMHG

## 2021-01-01 VITALS
BODY MASS INDEX: 20.25 KG/M2 | HEIGHT: 66 IN | WEIGHT: 126 LBS | OXYGEN SATURATION: 93 % | DIASTOLIC BLOOD PRESSURE: 60 MMHG | SYSTOLIC BLOOD PRESSURE: 104 MMHG | HEART RATE: 70 BPM

## 2021-01-01 VITALS
TEMPERATURE: 98 F | SYSTOLIC BLOOD PRESSURE: 124 MMHG | DIASTOLIC BLOOD PRESSURE: 51 MMHG | HEART RATE: 59 BPM | RESPIRATION RATE: 20 BRPM | OXYGEN SATURATION: 87 % | WEIGHT: 123.9 LBS | HEIGHT: 64 IN

## 2021-01-01 DIAGNOSIS — I48.91 UNSPECIFIED ATRIAL FIBRILLATION: ICD-10-CM

## 2021-01-01 DIAGNOSIS — N17.9 ACUTE KIDNEY FAILURE, UNSPECIFIED: ICD-10-CM

## 2021-01-01 DIAGNOSIS — I34.0 NONRHEUMATIC MITRAL (VALVE) INSUFFICIENCY: ICD-10-CM

## 2021-01-01 DIAGNOSIS — Z98.890 OTHER SPECIFIED POSTPROCEDURAL STATES: Chronic | ICD-10-CM

## 2021-01-01 DIAGNOSIS — Z02.9 ENCOUNTER FOR ADMINISTRATIVE EXAMINATIONS, UNSPECIFIED: ICD-10-CM

## 2021-01-01 DIAGNOSIS — I83.009 VARICOSE VEINS OF UNSPECIFIED LOWER EXTREMITY WITH ULCER OF UNSPECIFIED SITE: ICD-10-CM

## 2021-01-01 DIAGNOSIS — I10 ESSENTIAL (PRIMARY) HYPERTENSION: ICD-10-CM

## 2021-01-01 DIAGNOSIS — D64.9 ANEMIA, UNSPECIFIED: ICD-10-CM

## 2021-01-01 DIAGNOSIS — Z79.01 LONG TERM (CURRENT) USE OF ANTICOAGULANTS: ICD-10-CM

## 2021-01-01 DIAGNOSIS — Z29.9 ENCOUNTER FOR PROPHYLACTIC MEASURES, UNSPECIFIED: ICD-10-CM

## 2021-01-01 DIAGNOSIS — L03.116 CELLULITIS OF LEFT LOWER LIMB: ICD-10-CM

## 2021-01-01 DIAGNOSIS — I50.32 CHRONIC DIASTOLIC (CONGESTIVE) HEART FAILURE: ICD-10-CM

## 2021-01-01 DIAGNOSIS — J96.01 ACUTE RESPIRATORY FAILURE WITH HYPOXIA: ICD-10-CM

## 2021-01-01 DIAGNOSIS — I34.1 NONRHEUMATIC MITRAL (VALVE) PROLAPSE: ICD-10-CM

## 2021-01-01 DIAGNOSIS — I50.30 UNSPECIFIED DIASTOLIC (CONGESTIVE) HEART FAILURE: ICD-10-CM

## 2021-01-01 DIAGNOSIS — I27.20 PULMONARY HYPERTENSION, UNSPECIFIED: ICD-10-CM

## 2021-01-01 DIAGNOSIS — Z96.641 PRESENCE OF RIGHT ARTIFICIAL HIP JOINT: Chronic | ICD-10-CM

## 2021-01-01 DIAGNOSIS — R60.0 LOCALIZED EDEMA: ICD-10-CM

## 2021-01-01 LAB
ALBUMIN SERPL ELPH-MCNC: 3.5 G/DL — SIGNIFICANT CHANGE UP (ref 3.3–5)
ALBUMIN SERPL ELPH-MCNC: 3.6 G/DL
ALP BLD-CCNC: 170 U/L
ALP SERPL-CCNC: 190 U/L — HIGH (ref 40–120)
ALT FLD-CCNC: 20 U/L — SIGNIFICANT CHANGE UP (ref 10–45)
ALT SERPL-CCNC: 15 U/L
ANION GAP SERPL CALC-SCNC: 10 MMOL/L
ANION GAP SERPL CALC-SCNC: 10 MMOL/L — SIGNIFICANT CHANGE UP (ref 5–17)
ANION GAP SERPL CALC-SCNC: 11 MMOL/L — SIGNIFICANT CHANGE UP (ref 5–17)
ANION GAP SERPL CALC-SCNC: 12 MMOL/L — SIGNIFICANT CHANGE UP (ref 5–17)
ANION GAP SERPL CALC-SCNC: 12 MMOL/L — SIGNIFICANT CHANGE UP (ref 5–17)
ANION GAP SERPL CALC-SCNC: 7 MMOL/L — SIGNIFICANT CHANGE UP (ref 5–17)
ANION GAP SERPL CALC-SCNC: 8 MMOL/L — SIGNIFICANT CHANGE UP (ref 5–17)
ANION GAP SERPL CALC-SCNC: 9 MMOL/L — SIGNIFICANT CHANGE UP (ref 5–17)
APPEARANCE UR: CLEAR — SIGNIFICANT CHANGE UP
AST SERPL-CCNC: 32 U/L
AST SERPL-CCNC: 50 U/L — HIGH (ref 10–40)
BACTERIA # UR AUTO: NEGATIVE — SIGNIFICANT CHANGE UP
BASE EXCESS BLDA CALC-SCNC: 2.2 MMOL/L — HIGH (ref -2–2)
BASE EXCESS BLDV CALC-SCNC: 2.9 MMOL/L — HIGH (ref -2–2)
BASOPHILS # BLD AUTO: 0.05 K/UL — SIGNIFICANT CHANGE UP (ref 0–0.2)
BASOPHILS # BLD AUTO: 0.06 K/UL
BASOPHILS # BLD AUTO: 0.06 K/UL — SIGNIFICANT CHANGE UP (ref 0–0.2)
BASOPHILS NFR BLD AUTO: 0.8 % — SIGNIFICANT CHANGE UP (ref 0–2)
BASOPHILS NFR BLD AUTO: 1 %
BASOPHILS NFR BLD AUTO: 1.1 % — SIGNIFICANT CHANGE UP (ref 0–2)
BILIRUB SERPL-MCNC: 0.9 MG/DL
BILIRUB SERPL-MCNC: 1.2 MG/DL — SIGNIFICANT CHANGE UP (ref 0.2–1.2)
BILIRUB UR-MCNC: NEGATIVE — SIGNIFICANT CHANGE UP
BUN SERPL-MCNC: 33 MG/DL — HIGH (ref 7–23)
BUN SERPL-MCNC: 33 MG/DL — HIGH (ref 7–23)
BUN SERPL-MCNC: 36 MG/DL — HIGH (ref 7–23)
BUN SERPL-MCNC: 41 MG/DL — HIGH (ref 7–23)
BUN SERPL-MCNC: 45 MG/DL — HIGH (ref 7–23)
BUN SERPL-MCNC: 51 MG/DL — HIGH (ref 7–23)
BUN SERPL-MCNC: 51 MG/DL — HIGH (ref 7–23)
BUN SERPL-MCNC: 53 MG/DL — HIGH (ref 7–23)
BUN SERPL-MCNC: 55 MG/DL — HIGH (ref 7–23)
BUN SERPL-MCNC: 85 MG/DL
CA-I SERPL-SCNC: 1.22 MMOL/L — SIGNIFICANT CHANGE UP (ref 1.12–1.3)
CALCIUM SERPL-MCNC: 8.6 MG/DL — SIGNIFICANT CHANGE UP (ref 8.4–10.5)
CALCIUM SERPL-MCNC: 8.8 MG/DL — SIGNIFICANT CHANGE UP (ref 8.4–10.5)
CALCIUM SERPL-MCNC: 8.9 MG/DL — SIGNIFICANT CHANGE UP (ref 8.4–10.5)
CALCIUM SERPL-MCNC: 9 MG/DL — SIGNIFICANT CHANGE UP (ref 8.4–10.5)
CALCIUM SERPL-MCNC: 9 MG/DL — SIGNIFICANT CHANGE UP (ref 8.4–10.5)
CALCIUM SERPL-MCNC: 9.1 MG/DL — SIGNIFICANT CHANGE UP (ref 8.4–10.5)
CALCIUM SERPL-MCNC: 9.3 MG/DL — SIGNIFICANT CHANGE UP (ref 8.4–10.5)
CALCIUM SERPL-MCNC: 9.4 MG/DL — SIGNIFICANT CHANGE UP (ref 8.4–10.5)
CALCIUM SERPL-MCNC: 9.5 MG/DL — SIGNIFICANT CHANGE UP (ref 8.4–10.5)
CALCIUM SERPL-MCNC: 9.8 MG/DL
CHLORIDE BLDV-SCNC: 102 MMOL/L — SIGNIFICANT CHANGE UP (ref 96–108)
CHLORIDE SERPL-SCNC: 100 MMOL/L — SIGNIFICANT CHANGE UP (ref 96–108)
CHLORIDE SERPL-SCNC: 101 MMOL/L — SIGNIFICANT CHANGE UP (ref 96–108)
CHLORIDE SERPL-SCNC: 103 MMOL/L — SIGNIFICANT CHANGE UP (ref 96–108)
CHLORIDE SERPL-SCNC: 95 MMOL/L — LOW (ref 96–108)
CHLORIDE SERPL-SCNC: 96 MMOL/L — SIGNIFICANT CHANGE UP (ref 96–108)
CHLORIDE SERPL-SCNC: 97 MMOL/L — SIGNIFICANT CHANGE UP (ref 96–108)
CHLORIDE SERPL-SCNC: 98 MMOL/L
CHLORIDE SERPL-SCNC: 98 MMOL/L — SIGNIFICANT CHANGE UP (ref 96–108)
CHLORIDE SERPL-SCNC: 99 MMOL/L — SIGNIFICANT CHANGE UP (ref 96–108)
CHLORIDE SERPL-SCNC: 99 MMOL/L — SIGNIFICANT CHANGE UP (ref 96–108)
CO2 BLDA-SCNC: 27 MMOL/L — SIGNIFICANT CHANGE UP (ref 22–30)
CO2 BLDV-SCNC: 31 MMOL/L — HIGH (ref 22–30)
CO2 SERPL-SCNC: 23 MMOL/L — SIGNIFICANT CHANGE UP (ref 22–31)
CO2 SERPL-SCNC: 24 MMOL/L — SIGNIFICANT CHANGE UP (ref 22–31)
CO2 SERPL-SCNC: 25 MMOL/L — SIGNIFICANT CHANGE UP (ref 22–31)
CO2 SERPL-SCNC: 27 MMOL/L — SIGNIFICANT CHANGE UP (ref 22–31)
CO2 SERPL-SCNC: 28 MMOL/L
CO2 SERPL-SCNC: 28 MMOL/L — SIGNIFICANT CHANGE UP (ref 22–31)
CO2 SERPL-SCNC: 30 MMOL/L — SIGNIFICANT CHANGE UP (ref 22–31)
CO2 SERPL-SCNC: 30 MMOL/L — SIGNIFICANT CHANGE UP (ref 22–31)
CO2 SERPL-SCNC: 31 MMOL/L — SIGNIFICANT CHANGE UP (ref 22–31)
CO2 SERPL-SCNC: 31 MMOL/L — SIGNIFICANT CHANGE UP (ref 22–31)
COLOR SPEC: YELLOW — SIGNIFICANT CHANGE UP
COVID-19 SPIKE DOMAIN AB INTERP: NEGATIVE — SIGNIFICANT CHANGE UP
COVID-19 SPIKE DOMAIN ANTIBODY RESULT: 0.4 U/ML — SIGNIFICANT CHANGE UP
CREAT SERPL-MCNC: 1.17 MG/DL — SIGNIFICANT CHANGE UP (ref 0.5–1.3)
CREAT SERPL-MCNC: 1.21 MG/DL — SIGNIFICANT CHANGE UP (ref 0.5–1.3)
CREAT SERPL-MCNC: 1.26 MG/DL — SIGNIFICANT CHANGE UP (ref 0.5–1.3)
CREAT SERPL-MCNC: 1.33 MG/DL — HIGH (ref 0.5–1.3)
CREAT SERPL-MCNC: 1.34 MG/DL — HIGH (ref 0.5–1.3)
CREAT SERPL-MCNC: 1.34 MG/DL — HIGH (ref 0.5–1.3)
CREAT SERPL-MCNC: 1.44 MG/DL — HIGH (ref 0.5–1.3)
CREAT SERPL-MCNC: 1.49 MG/DL — HIGH (ref 0.5–1.3)
CREAT SERPL-MCNC: 1.58 MG/DL — HIGH (ref 0.5–1.3)
CREAT SERPL-MCNC: 1.6 MG/DL
CULTURE RESULTS: NO GROWTH — SIGNIFICANT CHANGE UP
D DIMER BLD IA.RAPID-MCNC: 200 NG/ML DDU — SIGNIFICANT CHANGE UP
DIFF PNL FLD: NEGATIVE — SIGNIFICANT CHANGE UP
EOSINOPHIL # BLD AUTO: 0.12 K/UL — SIGNIFICANT CHANGE UP (ref 0–0.5)
EOSINOPHIL # BLD AUTO: 0.14 K/UL
EOSINOPHIL # BLD AUTO: 0.21 K/UL — SIGNIFICANT CHANGE UP (ref 0–0.5)
EOSINOPHIL NFR BLD AUTO: 2.2 % — SIGNIFICANT CHANGE UP (ref 0–6)
EOSINOPHIL NFR BLD AUTO: 2.4 %
EOSINOPHIL NFR BLD AUTO: 3.4 % — SIGNIFICANT CHANGE UP (ref 0–6)
EPI CELLS # UR: 0 /HPF — SIGNIFICANT CHANGE UP
FERRITIN SERPL-MCNC: 28 NG/ML — SIGNIFICANT CHANGE UP (ref 15–150)
FOLATE SERPL-MCNC: 18.7 NG/ML — SIGNIFICANT CHANGE UP
GAS PNL BLDV: 134 MMOL/L — LOW (ref 135–145)
GAS PNL BLDV: SIGNIFICANT CHANGE UP
GAS PNL BLDV: SIGNIFICANT CHANGE UP
GLUCOSE BLDC GLUCOMTR-MCNC: 209 MG/DL — HIGH (ref 70–99)
GLUCOSE BLDV-MCNC: 107 MG/DL — HIGH (ref 70–99)
GLUCOSE SERPL-MCNC: 100 MG/DL — HIGH (ref 70–99)
GLUCOSE SERPL-MCNC: 102 MG/DL — HIGH (ref 70–99)
GLUCOSE SERPL-MCNC: 120 MG/DL — HIGH (ref 70–99)
GLUCOSE SERPL-MCNC: 172 MG/DL — HIGH (ref 70–99)
GLUCOSE SERPL-MCNC: 184 MG/DL — HIGH (ref 70–99)
GLUCOSE SERPL-MCNC: 91 MG/DL
GLUCOSE SERPL-MCNC: 92 MG/DL — SIGNIFICANT CHANGE UP (ref 70–99)
GLUCOSE SERPL-MCNC: 92 MG/DL — SIGNIFICANT CHANGE UP (ref 70–99)
GLUCOSE SERPL-MCNC: 94 MG/DL — SIGNIFICANT CHANGE UP (ref 70–99)
GLUCOSE SERPL-MCNC: 97 MG/DL — SIGNIFICANT CHANGE UP (ref 70–99)
GLUCOSE UR QL: NEGATIVE — SIGNIFICANT CHANGE UP
HAPTOGLOB SERPL-MCNC: 113 MG/DL — SIGNIFICANT CHANGE UP (ref 34–200)
HCO3 BLDA-SCNC: 26 MMOL/L — SIGNIFICANT CHANGE UP (ref 21–29)
HCO3 BLDV-SCNC: 29 MMOL/L — SIGNIFICANT CHANGE UP (ref 21–29)
HCT VFR BLD CALC: 28.8 % — LOW (ref 34.5–45)
HCT VFR BLD CALC: 29.7 % — LOW (ref 34.5–45)
HCT VFR BLD CALC: 32 % — LOW (ref 34.5–45)
HCT VFR BLD CALC: 32 % — LOW (ref 34.5–45)
HCT VFR BLD CALC: 32.2 % — LOW (ref 34.5–45)
HCT VFR BLD CALC: 32.7 %
HCT VFR BLD CALC: 33.5 % — LOW (ref 34.5–45)
HCT VFR BLDA CALC: 31 % — LOW (ref 39–50)
HGB BLD CALC-MCNC: 10 G/DL — LOW (ref 11.5–15.5)
HGB BLD-MCNC: 8.8 G/DL — LOW (ref 11.5–15.5)
HGB BLD-MCNC: 9 G/DL — LOW (ref 11.5–15.5)
HGB BLD-MCNC: 9.3 G/DL — LOW (ref 11.5–15.5)
HGB BLD-MCNC: 9.4 G/DL — LOW (ref 11.5–15.5)
HGB BLD-MCNC: 9.4 G/DL — LOW (ref 11.5–15.5)
HGB BLD-MCNC: 9.7 G/DL
HGB BLD-MCNC: 9.8 G/DL — LOW (ref 11.5–15.5)
HYALINE CASTS # UR AUTO: 1 /LPF — SIGNIFICANT CHANGE UP (ref 0–2)
IMM GRANULOCYTES NFR BLD AUTO: 0.2 %
IMM GRANULOCYTES NFR BLD AUTO: 0.3 % — SIGNIFICANT CHANGE UP (ref 0–1.5)
IMM GRANULOCYTES NFR BLD AUTO: 0.4 % — SIGNIFICANT CHANGE UP (ref 0–1.5)
INR BLD: 1.12 RATIO — SIGNIFICANT CHANGE UP (ref 0.88–1.16)
IRON SATN MFR SERPL: 29 UG/DL — LOW (ref 30–160)
IRON SATN MFR SERPL: 8 % — LOW (ref 14–50)
KETONES UR-MCNC: NEGATIVE — SIGNIFICANT CHANGE UP
LACTATE BLDV-MCNC: 1.7 MMOL/L — SIGNIFICANT CHANGE UP (ref 0.7–2)
LDH SERPL L TO P-CCNC: 176 U/L — SIGNIFICANT CHANGE UP (ref 50–242)
LEUKOCYTE ESTERASE UR-ACNC: NEGATIVE — SIGNIFICANT CHANGE UP
LYMPHOCYTES # BLD AUTO: 0.62 K/UL
LYMPHOCYTES # BLD AUTO: 0.79 K/UL — LOW (ref 1–3.3)
LYMPHOCYTES # BLD AUTO: 1.26 K/UL — SIGNIFICANT CHANGE UP (ref 1–3.3)
LYMPHOCYTES # BLD AUTO: 14.3 % — SIGNIFICANT CHANGE UP (ref 13–44)
LYMPHOCYTES # BLD AUTO: 20.4 % — SIGNIFICANT CHANGE UP (ref 13–44)
LYMPHOCYTES NFR BLD AUTO: 10.8 %
MAGNESIUM SERPL-MCNC: 2 MG/DL — SIGNIFICANT CHANGE UP (ref 1.6–2.6)
MAGNESIUM SERPL-MCNC: 2.1 MG/DL — SIGNIFICANT CHANGE UP (ref 1.6–2.6)
MAGNESIUM SERPL-MCNC: 2.3 MG/DL — SIGNIFICANT CHANGE UP (ref 1.6–2.6)
MAGNESIUM SERPL-MCNC: 2.5 MG/DL — SIGNIFICANT CHANGE UP (ref 1.6–2.6)
MAGNESIUM SERPL-MCNC: 2.6 MG/DL
MAGNESIUM SERPL-MCNC: 2.8 MG/DL — HIGH (ref 1.6–2.6)
MAGNESIUM SERPL-MCNC: 2.8 MG/DL — HIGH (ref 1.6–2.6)
MAN DIFF?: NORMAL
MCHC RBC-ENTMCNC: 27.2 PG — SIGNIFICANT CHANGE UP (ref 27–34)
MCHC RBC-ENTMCNC: 27.3 PG — SIGNIFICANT CHANGE UP (ref 27–34)
MCHC RBC-ENTMCNC: 27.5 PG — SIGNIFICANT CHANGE UP (ref 27–34)
MCHC RBC-ENTMCNC: 27.6 PG — SIGNIFICANT CHANGE UP (ref 27–34)
MCHC RBC-ENTMCNC: 27.6 PG — SIGNIFICANT CHANGE UP (ref 27–34)
MCHC RBC-ENTMCNC: 28 PG — SIGNIFICANT CHANGE UP (ref 27–34)
MCHC RBC-ENTMCNC: 28.4 PG
MCHC RBC-ENTMCNC: 29.1 GM/DL — LOW (ref 32–36)
MCHC RBC-ENTMCNC: 29.2 GM/DL — LOW (ref 32–36)
MCHC RBC-ENTMCNC: 29.3 GM/DL — LOW (ref 32–36)
MCHC RBC-ENTMCNC: 29.4 GM/DL — LOW (ref 32–36)
MCHC RBC-ENTMCNC: 29.7 GM/DL
MCHC RBC-ENTMCNC: 30.3 GM/DL — LOW (ref 32–36)
MCHC RBC-ENTMCNC: 30.6 GM/DL — LOW (ref 32–36)
MCV RBC AUTO: 90.8 FL — SIGNIFICANT CHANGE UP (ref 80–100)
MCV RBC AUTO: 91.7 FL — SIGNIFICANT CHANGE UP (ref 80–100)
MCV RBC AUTO: 93.1 FL — SIGNIFICANT CHANGE UP (ref 80–100)
MCV RBC AUTO: 93.8 FL — SIGNIFICANT CHANGE UP (ref 80–100)
MCV RBC AUTO: 93.8 FL — SIGNIFICANT CHANGE UP (ref 80–100)
MCV RBC AUTO: 94.4 FL — SIGNIFICANT CHANGE UP (ref 80–100)
MCV RBC AUTO: 95.6 FL
MONOCYTES # BLD AUTO: 0.59 K/UL
MONOCYTES # BLD AUTO: 0.65 K/UL — SIGNIFICANT CHANGE UP (ref 0–0.9)
MONOCYTES # BLD AUTO: 0.71 K/UL — SIGNIFICANT CHANGE UP (ref 0–0.9)
MONOCYTES NFR BLD AUTO: 10.3 %
MONOCYTES NFR BLD AUTO: 11.5 % — SIGNIFICANT CHANGE UP (ref 2–14)
MONOCYTES NFR BLD AUTO: 11.8 % — SIGNIFICANT CHANGE UP (ref 2–14)
NEUTROPHILS # BLD AUTO: 3.88 K/UL — SIGNIFICANT CHANGE UP (ref 1.8–7.4)
NEUTROPHILS # BLD AUTO: 3.93 K/UL — SIGNIFICANT CHANGE UP (ref 1.8–7.4)
NEUTROPHILS # BLD AUTO: 4.31 K/UL
NEUTROPHILS NFR BLD AUTO: 63.6 % — SIGNIFICANT CHANGE UP (ref 43–77)
NEUTROPHILS NFR BLD AUTO: 70.2 % — SIGNIFICANT CHANGE UP (ref 43–77)
NEUTROPHILS NFR BLD AUTO: 75.3 %
NITRITE UR-MCNC: NEGATIVE — SIGNIFICANT CHANGE UP
NRBC # BLD: 0 /100 WBCS — SIGNIFICANT CHANGE UP (ref 0–0)
NT-PROBNP SERPL-MCNC: 1546 PG/ML
NT-PROBNP SERPL-SCNC: 1829 PG/ML — HIGH (ref 0–300)
OB PNL STL: POSITIVE
PCO2 BLDA: 39 MMHG — SIGNIFICANT CHANGE UP (ref 32–46)
PCO2 BLDV: 56 MMHG — HIGH (ref 35–50)
PH BLDA: 7.44 — SIGNIFICANT CHANGE UP (ref 7.35–7.45)
PH BLDV: 7.34 — LOW (ref 7.35–7.45)
PH UR: 6.5 — SIGNIFICANT CHANGE UP (ref 5–8)
PHOSPHATE SERPL-MCNC: 2.2 MG/DL — LOW (ref 2.5–4.5)
PHOSPHATE SERPL-MCNC: 2.5 MG/DL — SIGNIFICANT CHANGE UP (ref 2.5–4.5)
PHOSPHATE SERPL-MCNC: 2.5 MG/DL — SIGNIFICANT CHANGE UP (ref 2.5–4.5)
PHOSPHATE SERPL-MCNC: 3 MG/DL — SIGNIFICANT CHANGE UP (ref 2.5–4.5)
PLATELET # BLD AUTO: 115 K/UL — LOW (ref 150–400)
PLATELET # BLD AUTO: 123 K/UL — LOW (ref 150–400)
PLATELET # BLD AUTO: 140 K/UL — LOW (ref 150–400)
PLATELET # BLD AUTO: 143 K/UL — LOW (ref 150–400)
PLATELET # BLD AUTO: 149 K/UL — LOW (ref 150–400)
PLATELET # BLD AUTO: 155 K/UL
PLATELET # BLD AUTO: 174 K/UL — SIGNIFICANT CHANGE UP (ref 150–400)
PO2 BLDA: 422 MMHG — HIGH (ref 74–108)
PO2 BLDV: 21 MMHG — LOW (ref 25–45)
POTASSIUM BLDV-SCNC: 5.6 MMOL/L — HIGH (ref 3.5–5.3)
POTASSIUM SERPL-MCNC: 3.7 MMOL/L — SIGNIFICANT CHANGE UP (ref 3.5–5.3)
POTASSIUM SERPL-MCNC: 3.9 MMOL/L — SIGNIFICANT CHANGE UP (ref 3.5–5.3)
POTASSIUM SERPL-MCNC: 4.1 MMOL/L — SIGNIFICANT CHANGE UP (ref 3.5–5.3)
POTASSIUM SERPL-MCNC: 4.5 MMOL/L — SIGNIFICANT CHANGE UP (ref 3.5–5.3)
POTASSIUM SERPL-MCNC: 4.5 MMOL/L — SIGNIFICANT CHANGE UP (ref 3.5–5.3)
POTASSIUM SERPL-MCNC: 4.6 MMOL/L — SIGNIFICANT CHANGE UP (ref 3.5–5.3)
POTASSIUM SERPL-MCNC: 5.1 MMOL/L — SIGNIFICANT CHANGE UP (ref 3.5–5.3)
POTASSIUM SERPL-MCNC: 5.2 MMOL/L — SIGNIFICANT CHANGE UP (ref 3.5–5.3)
POTASSIUM SERPL-MCNC: 6.4 MMOL/L — CRITICAL HIGH (ref 3.5–5.3)
POTASSIUM SERPL-SCNC: 3.7 MMOL/L — SIGNIFICANT CHANGE UP (ref 3.5–5.3)
POTASSIUM SERPL-SCNC: 3.9 MMOL/L — SIGNIFICANT CHANGE UP (ref 3.5–5.3)
POTASSIUM SERPL-SCNC: 4.1 MMOL/L — SIGNIFICANT CHANGE UP (ref 3.5–5.3)
POTASSIUM SERPL-SCNC: 4.5 MMOL/L — SIGNIFICANT CHANGE UP (ref 3.5–5.3)
POTASSIUM SERPL-SCNC: 4.5 MMOL/L — SIGNIFICANT CHANGE UP (ref 3.5–5.3)
POTASSIUM SERPL-SCNC: 4.6 MMOL/L — SIGNIFICANT CHANGE UP (ref 3.5–5.3)
POTASSIUM SERPL-SCNC: 4.7 MMOL/L
POTASSIUM SERPL-SCNC: 5.1 MMOL/L — SIGNIFICANT CHANGE UP (ref 3.5–5.3)
POTASSIUM SERPL-SCNC: 5.2 MMOL/L — SIGNIFICANT CHANGE UP (ref 3.5–5.3)
POTASSIUM SERPL-SCNC: 6.4 MMOL/L — CRITICAL HIGH (ref 3.5–5.3)
PROCALCITONIN SERPL-MCNC: 0.18 NG/ML — HIGH (ref 0.02–0.1)
PROT SERPL-MCNC: 7 G/DL
PROT SERPL-MCNC: 7.8 G/DL — SIGNIFICANT CHANGE UP (ref 6–8.3)
PROT UR-MCNC: ABNORMAL
PROTHROM AB SERPL-ACNC: 13.4 SEC — SIGNIFICANT CHANGE UP (ref 10.6–13.6)
RAPID RVP RESULT: SIGNIFICANT CHANGE UP
RBC # BLD: 3.14 M/UL — LOW (ref 3.8–5.2)
RBC # BLD: 3.27 M/UL — LOW (ref 3.8–5.2)
RBC # BLD: 3.41 M/UL — LOW (ref 3.8–5.2)
RBC # BLD: 3.41 M/UL — LOW (ref 3.8–5.2)
RBC # BLD: 3.42 M/UL
RBC # BLD: 3.46 M/UL — LOW (ref 3.8–5.2)
RBC # BLD: 3.55 M/UL — LOW (ref 3.8–5.2)
RBC # FLD: 16.1 %
RBC # FLD: 16.1 % — HIGH (ref 10.3–14.5)
RBC # FLD: 16.2 % — HIGH (ref 10.3–14.5)
RBC # FLD: 16.2 % — HIGH (ref 10.3–14.5)
RBC # FLD: 16.4 % — HIGH (ref 10.3–14.5)
RBC CASTS # UR COMP ASSIST: 7 /HPF — HIGH (ref 0–4)
SAO2 % BLDA: 100 % — HIGH (ref 92–96)
SAO2 % BLDV: 24 % — LOW (ref 67–88)
SARS-COV-2 IGG+IGM SERPL QL IA: 0.4 U/ML — SIGNIFICANT CHANGE UP
SARS-COV-2 IGG+IGM SERPL QL IA: NEGATIVE — SIGNIFICANT CHANGE UP
SARS-COV-2 RNA SPEC QL NAA+PROBE: SIGNIFICANT CHANGE UP
SARS-COV-2 RNA SPEC QL NAA+PROBE: SIGNIFICANT CHANGE UP
SODIUM SERPL-SCNC: 131 MMOL/L — LOW (ref 135–145)
SODIUM SERPL-SCNC: 134 MMOL/L — LOW (ref 135–145)
SODIUM SERPL-SCNC: 135 MMOL/L — SIGNIFICANT CHANGE UP (ref 135–145)
SODIUM SERPL-SCNC: 136 MMOL/L — SIGNIFICANT CHANGE UP (ref 135–145)
SODIUM SERPL-SCNC: 136 MMOL/L — SIGNIFICANT CHANGE UP (ref 135–145)
SODIUM SERPL-SCNC: 137 MMOL/L
SODIUM SERPL-SCNC: 138 MMOL/L — SIGNIFICANT CHANGE UP (ref 135–145)
SODIUM SERPL-SCNC: 138 MMOL/L — SIGNIFICANT CHANGE UP (ref 135–145)
SODIUM SERPL-SCNC: 139 MMOL/L — SIGNIFICANT CHANGE UP (ref 135–145)
SODIUM SERPL-SCNC: 139 MMOL/L — SIGNIFICANT CHANGE UP (ref 135–145)
SP GR SPEC: 1.02 — SIGNIFICANT CHANGE UP (ref 1.01–1.02)
SPECIMEN SOURCE: SIGNIFICANT CHANGE UP
T4 FREE SERPL-MCNC: 1 NG/DL — SIGNIFICANT CHANGE UP (ref 0.9–1.8)
TIBC SERPL-MCNC: 367 UG/DL — SIGNIFICANT CHANGE UP (ref 220–430)
TRANSFERRIN SERPL-MCNC: 284 MG/DL — SIGNIFICANT CHANGE UP (ref 200–360)
TROPONIN T, HIGH SENSITIVITY RESULT: 86 NG/L — HIGH (ref 0–51)
TROPONIN T, HIGH SENSITIVITY RESULT: 90 NG/L — HIGH (ref 0–51)
TSH SERPL-ACNC: 4.66 UIU/ML
TSH SERPL-MCNC: 5.61 UIU/ML — HIGH (ref 0.27–4.2)
UIBC SERPL-MCNC: 338 UG/DL — SIGNIFICANT CHANGE UP (ref 110–370)
UROBILINOGEN FLD QL: ABNORMAL
VIT B12 SERPL-MCNC: 1139 PG/ML — SIGNIFICANT CHANGE UP (ref 232–1245)
WBC # BLD: 5.43 K/UL — SIGNIFICANT CHANGE UP (ref 3.8–10.5)
WBC # BLD: 5.52 K/UL — SIGNIFICANT CHANGE UP (ref 3.8–10.5)
WBC # BLD: 6.18 K/UL — SIGNIFICANT CHANGE UP (ref 3.8–10.5)
WBC # BLD: 6.56 K/UL — SIGNIFICANT CHANGE UP (ref 3.8–10.5)
WBC # BLD: 6.98 K/UL — SIGNIFICANT CHANGE UP (ref 3.8–10.5)
WBC # BLD: 8.56 K/UL — SIGNIFICANT CHANGE UP (ref 3.8–10.5)
WBC # FLD AUTO: 5.43 K/UL — SIGNIFICANT CHANGE UP (ref 3.8–10.5)
WBC # FLD AUTO: 5.52 K/UL — SIGNIFICANT CHANGE UP (ref 3.8–10.5)
WBC # FLD AUTO: 5.73 K/UL
WBC # FLD AUTO: 6.18 K/UL — SIGNIFICANT CHANGE UP (ref 3.8–10.5)
WBC # FLD AUTO: 6.56 K/UL — SIGNIFICANT CHANGE UP (ref 3.8–10.5)
WBC # FLD AUTO: 6.98 K/UL — SIGNIFICANT CHANGE UP (ref 3.8–10.5)
WBC # FLD AUTO: 8.56 K/UL — SIGNIFICANT CHANGE UP (ref 3.8–10.5)
WBC UR QL: 1 /HPF — SIGNIFICANT CHANGE UP (ref 0–5)

## 2021-01-01 PROCEDURE — 93970 EXTREMITY STUDY: CPT

## 2021-01-01 PROCEDURE — 99232 SBSQ HOSP IP/OBS MODERATE 35: CPT | Mod: GC

## 2021-01-01 PROCEDURE — U0003: CPT

## 2021-01-01 PROCEDURE — 93970 EXTREMITY STUDY: CPT | Mod: 26

## 2021-01-01 PROCEDURE — 80053 COMPREHEN METABOLIC PANEL: CPT

## 2021-01-01 PROCEDURE — 97116 GAIT TRAINING THERAPY: CPT

## 2021-01-01 PROCEDURE — 84484 ASSAY OF TROPONIN QUANT: CPT

## 2021-01-01 PROCEDURE — 99233 SBSQ HOSP IP/OBS HIGH 50: CPT | Mod: GC

## 2021-01-01 PROCEDURE — 99222 1ST HOSP IP/OBS MODERATE 55: CPT | Mod: GC

## 2021-01-01 PROCEDURE — 83605 ASSAY OF LACTIC ACID: CPT

## 2021-01-01 PROCEDURE — 82746 ASSAY OF FOLIC ACID SERUM: CPT

## 2021-01-01 PROCEDURE — 93000 ELECTROCARDIOGRAM COMPLETE: CPT

## 2021-01-01 PROCEDURE — 71045 X-RAY EXAM CHEST 1 VIEW: CPT | Mod: 26

## 2021-01-01 PROCEDURE — 85027 COMPLETE CBC AUTOMATED: CPT

## 2021-01-01 PROCEDURE — 93005 ELECTROCARDIOGRAM TRACING: CPT

## 2021-01-01 PROCEDURE — 83735 ASSAY OF MAGNESIUM: CPT

## 2021-01-01 PROCEDURE — 36600 WITHDRAWAL OF ARTERIAL BLOOD: CPT

## 2021-01-01 PROCEDURE — 82962 GLUCOSE BLOOD TEST: CPT

## 2021-01-01 PROCEDURE — 36415 COLL VENOUS BLD VENIPUNCTURE: CPT

## 2021-01-01 PROCEDURE — 85379 FIBRIN DEGRADATION QUANT: CPT

## 2021-01-01 PROCEDURE — 84439 ASSAY OF FREE THYROXINE: CPT

## 2021-01-01 PROCEDURE — 97162 PT EVAL MOD COMPLEX 30 MIN: CPT

## 2021-01-01 PROCEDURE — 82803 BLOOD GASES ANY COMBINATION: CPT

## 2021-01-01 PROCEDURE — 83540 ASSAY OF IRON: CPT

## 2021-01-01 PROCEDURE — 97602 WOUND(S) CARE NON-SELECTIVE: CPT

## 2021-01-01 PROCEDURE — 94640 AIRWAY INHALATION TREATMENT: CPT

## 2021-01-01 PROCEDURE — 99223 1ST HOSP IP/OBS HIGH 75: CPT | Mod: GC

## 2021-01-01 PROCEDURE — 84145 PROCALCITONIN (PCT): CPT

## 2021-01-01 PROCEDURE — 83550 IRON BINDING TEST: CPT

## 2021-01-01 PROCEDURE — 82330 ASSAY OF CALCIUM: CPT

## 2021-01-01 PROCEDURE — 83880 ASSAY OF NATRIURETIC PEPTIDE: CPT

## 2021-01-01 PROCEDURE — 82947 ASSAY GLUCOSE BLOOD QUANT: CPT

## 2021-01-01 PROCEDURE — 85025 COMPLETE CBC W/AUTO DIFF WBC: CPT

## 2021-01-01 PROCEDURE — 82435 ASSAY OF BLOOD CHLORIDE: CPT

## 2021-01-01 PROCEDURE — 84443 ASSAY THYROID STIM HORMONE: CPT

## 2021-01-01 PROCEDURE — 83615 LACTATE (LD) (LDH) ENZYME: CPT

## 2021-01-01 PROCEDURE — 85014 HEMATOCRIT: CPT

## 2021-01-01 PROCEDURE — 82272 OCCULT BLD FECES 1-3 TESTS: CPT

## 2021-01-01 PROCEDURE — 97164 PT RE-EVAL EST PLAN CARE: CPT

## 2021-01-01 PROCEDURE — 85018 HEMOGLOBIN: CPT

## 2021-01-01 PROCEDURE — 99214 OFFICE O/P EST MOD 30 MIN: CPT

## 2021-01-01 PROCEDURE — 93010 ELECTROCARDIOGRAM REPORT: CPT | Mod: GC

## 2021-01-01 PROCEDURE — 85610 PROTHROMBIN TIME: CPT

## 2021-01-01 PROCEDURE — 87086 URINE CULTURE/COLONY COUNT: CPT

## 2021-01-01 PROCEDURE — 82728 ASSAY OF FERRITIN: CPT

## 2021-01-01 PROCEDURE — 71045 X-RAY EXAM CHEST 1 VIEW: CPT

## 2021-01-01 PROCEDURE — 80048 BASIC METABOLIC PNL TOTAL CA: CPT

## 2021-01-01 PROCEDURE — 99239 HOSP IP/OBS DSCHRG MGMT >30: CPT

## 2021-01-01 PROCEDURE — 0031A: CPT

## 2021-01-01 PROCEDURE — U0005: CPT

## 2021-01-01 PROCEDURE — 99215 OFFICE O/P EST HI 40 MIN: CPT

## 2021-01-01 PROCEDURE — 99292 CRITICAL CARE ADDL 30 MIN: CPT | Mod: CS,GC

## 2021-01-01 PROCEDURE — 82607 VITAMIN B-12: CPT

## 2021-01-01 PROCEDURE — 84132 ASSAY OF SERUM POTASSIUM: CPT

## 2021-01-01 PROCEDURE — 83010 ASSAY OF HAPTOGLOBIN QUANT: CPT

## 2021-01-01 PROCEDURE — 97112 NEUROMUSCULAR REEDUCATION: CPT

## 2021-01-01 PROCEDURE — 71250 CT THORAX DX C-: CPT | Mod: 26

## 2021-01-01 PROCEDURE — 71250 CT THORAX DX C-: CPT

## 2021-01-01 PROCEDURE — 86769 SARS-COV-2 COVID-19 ANTIBODY: CPT

## 2021-01-01 PROCEDURE — 0225U NFCT DS DNA&RNA 21 SARSCOV2: CPT

## 2021-01-01 PROCEDURE — 84295 ASSAY OF SERUM SODIUM: CPT

## 2021-01-01 PROCEDURE — 84100 ASSAY OF PHOSPHORUS: CPT

## 2021-01-01 PROCEDURE — 93306 TTE W/DOPPLER COMPLETE: CPT

## 2021-01-01 PROCEDURE — 84466 ASSAY OF TRANSFERRIN: CPT

## 2021-01-01 PROCEDURE — 99285 EMERGENCY DEPT VISIT HI MDM: CPT | Mod: 25

## 2021-01-01 PROCEDURE — 99291 CRITICAL CARE FIRST HOUR: CPT | Mod: CS,GC

## 2021-01-01 PROCEDURE — 81001 URINALYSIS AUTO W/SCOPE: CPT

## 2021-01-01 PROCEDURE — 96365 THER/PROPH/DIAG IV INF INIT: CPT

## 2021-01-01 PROCEDURE — 12345: CPT | Mod: NC,GC

## 2021-01-01 PROCEDURE — 93306 TTE W/DOPPLER COMPLETE: CPT | Mod: 26

## 2021-01-01 RX ORDER — ACETAMINOPHEN 500 MG
2 TABLET ORAL
Qty: 0 | Refills: 0 | DISCHARGE
Start: 2021-01-01

## 2021-01-01 RX ORDER — LOSARTAN POTASSIUM 100 MG/1
1 TABLET, FILM COATED ORAL
Qty: 0 | Refills: 0 | DISCHARGE

## 2021-01-01 RX ORDER — LACTULOSE 10 G/15ML
30 SOLUTION ORAL
Qty: 0 | Refills: 0 | DISCHARGE
Start: 2021-01-01

## 2021-01-01 RX ORDER — LOSARTAN POTASSIUM 50 MG/1
50 TABLET, FILM COATED ORAL DAILY
Qty: 135 | Refills: 2 | Status: DISCONTINUED | COMMUNITY
Start: 2018-01-18 | End: 2021-01-01

## 2021-01-01 RX ORDER — CEFAZOLIN SODIUM 1 G
500 VIAL (EA) INJECTION ONCE
Refills: 0 | Status: COMPLETED | OUTPATIENT
Start: 2021-01-01 | End: 2021-01-01

## 2021-01-01 RX ORDER — APIXABAN 2.5 MG/1
2.5 TABLET, FILM COATED ORAL
Qty: 60 | Refills: 3 | Status: ACTIVE | COMMUNITY
Start: 2020-01-01 | End: 1900-01-01

## 2021-01-01 RX ORDER — SODIUM,POTASSIUM PHOSPHATES 278-250MG
1 POWDER IN PACKET (EA) ORAL
Refills: 0 | Status: DISCONTINUED | OUTPATIENT
Start: 2021-01-01 | End: 2021-01-01

## 2021-01-01 RX ORDER — ALBUTEROL 90 UG/1
2.5 AEROSOL, METERED ORAL EVERY 6 HOURS
Refills: 0 | Status: DISCONTINUED | OUTPATIENT
Start: 2021-01-01 | End: 2021-01-01

## 2021-01-01 RX ORDER — VANCOMYCIN HCL 1 G
750 VIAL (EA) INTRAVENOUS ONCE
Refills: 0 | Status: COMPLETED | OUTPATIENT
Start: 2021-01-01 | End: 2021-01-01

## 2021-01-01 RX ORDER — FLUTICASONE PROPIONATE 50 MCG
1 SPRAY, SUSPENSION NASAL
Refills: 0 | Status: DISCONTINUED | OUTPATIENT
Start: 2021-01-01 | End: 2021-01-01

## 2021-01-01 RX ORDER — FUROSEMIDE 40 MG
1 TABLET ORAL
Qty: 60 | Refills: 0
Start: 2021-01-01 | End: 2021-01-01

## 2021-01-01 RX ORDER — PANTOPRAZOLE SODIUM 20 MG/1
40 TABLET, DELAYED RELEASE ORAL DAILY
Refills: 0 | Status: DISCONTINUED | OUTPATIENT
Start: 2021-01-01 | End: 2021-01-01

## 2021-01-01 RX ORDER — CALCIUM CARBONATE 500(1250)
1 TABLET ORAL DAILY
Refills: 0 | Status: DISCONTINUED | OUTPATIENT
Start: 2021-01-01 | End: 2021-01-01

## 2021-01-01 RX ORDER — HEPARIN SODIUM 5000 [USP'U]/ML
5000 INJECTION INTRAVENOUS; SUBCUTANEOUS EVERY 12 HOURS
Refills: 0 | Status: DISCONTINUED | OUTPATIENT
Start: 2021-01-01 | End: 2021-01-01

## 2021-01-01 RX ORDER — FUROSEMIDE 40 MG
40 TABLET ORAL ONCE
Refills: 0 | Status: COMPLETED | OUTPATIENT
Start: 2021-01-01 | End: 2021-01-01

## 2021-01-01 RX ORDER — IPRATROPIUM/ALBUTEROL SULFATE 18-103MCG
3 AEROSOL WITH ADAPTER (GRAM) INHALATION
Qty: 180 | Refills: 0
Start: 2021-01-01 | End: 2021-01-01

## 2021-01-01 RX ORDER — ATORVASTATIN CALCIUM 80 MG/1
1 TABLET, FILM COATED ORAL
Qty: 0 | Refills: 0 | DISCHARGE

## 2021-01-01 RX ORDER — FLUTICASONE PROPIONATE 50 MCG
1 SPRAY, SUSPENSION NASAL
Qty: 0 | Refills: 0 | DISCHARGE
Start: 2021-01-01

## 2021-01-01 RX ORDER — CALCIUM CARBONATE 500(1250)
1 TABLET ORAL
Qty: 0 | Refills: 0 | DISCHARGE

## 2021-01-01 RX ORDER — PANTOPRAZOLE SODIUM 20 MG/1
1 TABLET, DELAYED RELEASE ORAL
Qty: 0 | Refills: 0 | DISCHARGE
Start: 2021-01-01

## 2021-01-01 RX ORDER — POLYETHYLENE GLYCOL 3350 17 G/17G
17 POWDER, FOR SOLUTION ORAL DAILY
Refills: 0 | Status: DISCONTINUED | OUTPATIENT
Start: 2021-01-01 | End: 2021-01-01

## 2021-01-01 RX ORDER — CEFAZOLIN SODIUM 1 G
500 VIAL (EA) INJECTION EVERY 12 HOURS
Refills: 0 | Status: DISCONTINUED | OUTPATIENT
Start: 2021-01-01 | End: 2021-01-01

## 2021-01-01 RX ORDER — HEPARIN SODIUM 5000 [USP'U]/ML
5000 INJECTION INTRAVENOUS; SUBCUTANEOUS
Qty: 0 | Refills: 0 | DISCHARGE
Start: 2021-01-01

## 2021-01-01 RX ORDER — IPRATROPIUM/ALBUTEROL SULFATE 18-103MCG
3 AEROSOL WITH ADAPTER (GRAM) INHALATION
Qty: 0 | Refills: 0 | DISCHARGE
Start: 2021-01-01

## 2021-01-01 RX ORDER — CEFAZOLIN SODIUM 1 G
VIAL (EA) INJECTION
Refills: 0 | Status: DISCONTINUED | OUTPATIENT
Start: 2021-01-01 | End: 2021-01-01

## 2021-01-01 RX ORDER — APIXABAN 2.5 MG/1
2.5 TABLET, FILM COATED ORAL EVERY 12 HOURS
Refills: 0 | Status: DISCONTINUED | OUTPATIENT
Start: 2021-01-01 | End: 2021-01-01

## 2021-01-01 RX ORDER — FUROSEMIDE 40 MG
1 TABLET ORAL
Qty: 0 | Refills: 0 | DISCHARGE
Start: 2021-01-01

## 2021-01-01 RX ORDER — PANTOPRAZOLE SODIUM 20 MG/1
40 TABLET, DELAYED RELEASE ORAL EVERY 12 HOURS
Refills: 0 | Status: DISCONTINUED | OUTPATIENT
Start: 2021-01-01 | End: 2021-01-01

## 2021-01-01 RX ORDER — JNJ-78436735 50000000000 [PFU]/.5ML
0.5 SUSPENSION INTRAMUSCULAR ONCE
Refills: 0 | Status: COMPLETED | OUTPATIENT
Start: 2021-01-01 | End: 2021-01-01

## 2021-01-01 RX ORDER — CALCIUM CARBONATE 500(1250)
1 TABLET ORAL
Qty: 0 | Refills: 0 | DISCHARGE
Start: 2021-01-01

## 2021-01-01 RX ORDER — METOPROLOL TARTRATE 50 MG
1 TABLET ORAL
Qty: 0 | Refills: 0 | DISCHARGE

## 2021-01-01 RX ORDER — LACTULOSE 10 G/15ML
20 SOLUTION ORAL
Refills: 0 | Status: DISCONTINUED | OUTPATIENT
Start: 2021-01-01 | End: 2021-01-01

## 2021-01-01 RX ORDER — IPRATROPIUM/ALBUTEROL SULFATE 18-103MCG
3 AEROSOL WITH ADAPTER (GRAM) INHALATION
Refills: 0 | Status: DISCONTINUED | OUTPATIENT
Start: 2021-01-01 | End: 2021-01-01

## 2021-01-01 RX ORDER — FUROSEMIDE 40 MG
40 TABLET ORAL
Refills: 0 | Status: DISCONTINUED | OUTPATIENT
Start: 2021-01-01 | End: 2021-01-01

## 2021-01-01 RX ORDER — LACTULOSE 10 G/15ML
20 SOLUTION ORAL DAILY
Refills: 0 | Status: DISCONTINUED | OUTPATIENT
Start: 2021-01-01 | End: 2021-01-01

## 2021-01-01 RX ORDER — ACETAMINOPHEN 500 MG
650 TABLET ORAL EVERY 8 HOURS
Refills: 0 | Status: DISCONTINUED | OUTPATIENT
Start: 2021-01-01 | End: 2021-01-01

## 2021-01-01 RX ORDER — CARVEDILOL PHOSPHATE 80 MG/1
1 CAPSULE, EXTENDED RELEASE ORAL
Qty: 0 | Refills: 0 | DISCHARGE

## 2021-01-01 RX ORDER — ALBUTEROL 90 UG/1
0 AEROSOL, METERED ORAL
Qty: 0 | Refills: 0 | DISCHARGE
Start: 2021-01-01

## 2021-01-01 RX ORDER — PANTOPRAZOLE SODIUM 20 MG/1
40 TABLET, DELAYED RELEASE ORAL
Refills: 0 | Status: DISCONTINUED | OUTPATIENT
Start: 2021-01-01 | End: 2021-01-01

## 2021-01-01 RX ORDER — ACETAMINOPHEN 500 MG
2 TABLET ORAL
Qty: 0 | Refills: 0 | DISCHARGE

## 2021-01-01 RX ORDER — POLYETHYLENE GLYCOL 3350 17 G/17G
17 POWDER, FOR SOLUTION ORAL
Qty: 0 | Refills: 0 | DISCHARGE
Start: 2021-01-01

## 2021-01-01 RX ADMIN — PANTOPRAZOLE SODIUM 40 MILLIGRAM(S): 20 TABLET, DELAYED RELEASE ORAL at 05:09

## 2021-01-01 RX ADMIN — Medication 3 MILLILITER(S): at 06:08

## 2021-01-01 RX ADMIN — HEPARIN SODIUM 5000 UNIT(S): 5000 INJECTION INTRAVENOUS; SUBCUTANEOUS at 05:34

## 2021-01-01 RX ADMIN — Medication 40 MILLIGRAM(S): at 15:10

## 2021-01-01 RX ADMIN — Medication 40 MILLIGRAM(S): at 17:30

## 2021-01-01 RX ADMIN — Medication 1 SPRAY(S): at 06:45

## 2021-01-01 RX ADMIN — Medication 3 MILLILITER(S): at 17:06

## 2021-01-01 RX ADMIN — HEPARIN SODIUM 5000 UNIT(S): 5000 INJECTION INTRAVENOUS; SUBCUTANEOUS at 17:40

## 2021-01-01 RX ADMIN — Medication 650 MILLIGRAM(S): at 02:17

## 2021-01-01 RX ADMIN — LACTULOSE 20 GRAM(S): 10 SOLUTION ORAL at 17:30

## 2021-01-01 RX ADMIN — LACTULOSE 20 GRAM(S): 10 SOLUTION ORAL at 21:03

## 2021-01-01 RX ADMIN — JNJ-78436735 0.5 MILLILITER(S): 50000000000 SUSPENSION INTRAMUSCULAR at 14:51

## 2021-01-01 RX ADMIN — Medication 1 SPRAY(S): at 05:34

## 2021-01-01 RX ADMIN — Medication 1 SPRAY(S): at 17:31

## 2021-01-01 RX ADMIN — ALBUTEROL 2.5 MILLIGRAM(S): 90 AEROSOL, METERED ORAL at 11:07

## 2021-01-01 RX ADMIN — Medication 40 MILLIGRAM(S): at 08:52

## 2021-01-01 RX ADMIN — Medication 250 MILLIGRAM(S): at 17:50

## 2021-01-01 RX ADMIN — Medication 1 SPRAY(S): at 06:03

## 2021-01-01 RX ADMIN — Medication 3 MILLILITER(S): at 17:20

## 2021-01-01 RX ADMIN — PANTOPRAZOLE SODIUM 40 MILLIGRAM(S): 20 TABLET, DELAYED RELEASE ORAL at 05:58

## 2021-01-01 RX ADMIN — PANTOPRAZOLE SODIUM 40 MILLIGRAM(S): 20 TABLET, DELAYED RELEASE ORAL at 17:59

## 2021-01-01 RX ADMIN — Medication 650 MILLIGRAM(S): at 11:18

## 2021-01-01 RX ADMIN — Medication 40 MILLIGRAM(S): at 21:13

## 2021-01-01 RX ADMIN — Medication 40 MILLIGRAM(S): at 17:39

## 2021-01-01 RX ADMIN — Medication 3 MILLILITER(S): at 06:54

## 2021-01-01 RX ADMIN — POLYETHYLENE GLYCOL 3350 17 GRAM(S): 17 POWDER, FOR SOLUTION ORAL at 11:45

## 2021-01-01 RX ADMIN — PANTOPRAZOLE SODIUM 40 MILLIGRAM(S): 20 TABLET, DELAYED RELEASE ORAL at 17:40

## 2021-01-01 RX ADMIN — PANTOPRAZOLE SODIUM 40 MILLIGRAM(S): 20 TABLET, DELAYED RELEASE ORAL at 05:23

## 2021-01-01 RX ADMIN — HEPARIN SODIUM 5000 UNIT(S): 5000 INJECTION INTRAVENOUS; SUBCUTANEOUS at 17:39

## 2021-01-01 RX ADMIN — Medication 1 SPRAY(S): at 17:47

## 2021-01-01 RX ADMIN — Medication 40 MILLIGRAM(S): at 05:23

## 2021-01-01 RX ADMIN — APIXABAN 2.5 MILLIGRAM(S): 2.5 TABLET, FILM COATED ORAL at 06:24

## 2021-01-01 RX ADMIN — Medication 1 SPRAY(S): at 05:58

## 2021-01-01 RX ADMIN — Medication 750 MILLIGRAM(S): at 18:57

## 2021-01-01 RX ADMIN — Medication 40 MILLIGRAM(S): at 17:47

## 2021-01-01 RX ADMIN — Medication 3 MILLILITER(S): at 17:22

## 2021-01-01 RX ADMIN — Medication 40 MILLIGRAM(S): at 06:23

## 2021-01-01 RX ADMIN — Medication 1 SPRAY(S): at 17:41

## 2021-01-01 RX ADMIN — HEPARIN SODIUM 5000 UNIT(S): 5000 INJECTION INTRAVENOUS; SUBCUTANEOUS at 17:30

## 2021-01-01 RX ADMIN — Medication 40 MILLIGRAM(S): at 06:04

## 2021-01-01 RX ADMIN — LACTULOSE 20 GRAM(S): 10 SOLUTION ORAL at 06:45

## 2021-01-01 RX ADMIN — Medication 650 MILLIGRAM(S): at 16:12

## 2021-01-01 RX ADMIN — Medication 100 MILLIGRAM(S): at 06:23

## 2021-01-01 RX ADMIN — LACTULOSE 20 GRAM(S): 10 SOLUTION ORAL at 17:41

## 2021-01-01 RX ADMIN — Medication 40 MILLIGRAM(S): at 05:58

## 2021-01-01 RX ADMIN — ALBUTEROL 2.5 MILLIGRAM(S): 90 AEROSOL, METERED ORAL at 17:35

## 2021-01-01 RX ADMIN — POLYETHYLENE GLYCOL 3350 17 GRAM(S): 17 POWDER, FOR SOLUTION ORAL at 23:57

## 2021-01-01 RX ADMIN — LACTULOSE 20 GRAM(S): 10 SOLUTION ORAL at 06:03

## 2021-01-01 RX ADMIN — PANTOPRAZOLE SODIUM 40 MILLIGRAM(S): 20 TABLET, DELAYED RELEASE ORAL at 05:34

## 2021-01-01 RX ADMIN — Medication 1 SPRAY(S): at 17:30

## 2021-01-01 RX ADMIN — Medication 1 SPRAY(S): at 05:09

## 2021-01-01 RX ADMIN — Medication 40 MILLIGRAM(S): at 06:45

## 2021-01-01 RX ADMIN — Medication 650 MILLIGRAM(S): at 01:06

## 2021-01-01 RX ADMIN — Medication 1 SPRAY(S): at 17:39

## 2021-01-01 RX ADMIN — Medication 650 MILLIGRAM(S): at 16:42

## 2021-01-01 RX ADMIN — Medication 1 SPRAY(S): at 17:58

## 2021-01-01 RX ADMIN — HEPARIN SODIUM 5000 UNIT(S): 5000 INJECTION INTRAVENOUS; SUBCUTANEOUS at 06:45

## 2021-01-01 RX ADMIN — PANTOPRAZOLE SODIUM 40 MILLIGRAM(S): 20 TABLET, DELAYED RELEASE ORAL at 17:47

## 2021-01-01 RX ADMIN — Medication 10 MILLIGRAM(S): at 17:42

## 2021-01-01 RX ADMIN — PANTOPRAZOLE SODIUM 40 MILLIGRAM(S): 20 TABLET, DELAYED RELEASE ORAL at 17:41

## 2021-01-01 RX ADMIN — POLYETHYLENE GLYCOL 3350 17 GRAM(S): 17 POWDER, FOR SOLUTION ORAL at 11:18

## 2021-01-01 RX ADMIN — Medication 3 MILLILITER(S): at 05:45

## 2021-01-01 RX ADMIN — PANTOPRAZOLE SODIUM 40 MILLIGRAM(S): 20 TABLET, DELAYED RELEASE ORAL at 11:18

## 2021-01-01 RX ADMIN — POLYETHYLENE GLYCOL 3350 17 GRAM(S): 17 POWDER, FOR SOLUTION ORAL at 11:07

## 2021-01-01 RX ADMIN — Medication 40 MILLIGRAM(S): at 17:33

## 2021-01-01 RX ADMIN — PANTOPRAZOLE SODIUM 40 MILLIGRAM(S): 20 TABLET, DELAYED RELEASE ORAL at 06:45

## 2021-01-01 RX ADMIN — Medication 40 MILLIGRAM(S): at 17:40

## 2021-01-01 RX ADMIN — Medication 40 MILLIGRAM(S): at 05:34

## 2021-01-01 RX ADMIN — HEPARIN SODIUM 5000 UNIT(S): 5000 INJECTION INTRAVENOUS; SUBCUTANEOUS at 06:03

## 2021-01-01 RX ADMIN — Medication 3 MILLILITER(S): at 06:07

## 2021-01-01 RX ADMIN — Medication 1 SPRAY(S): at 05:23

## 2021-01-01 RX ADMIN — PANTOPRAZOLE SODIUM 40 MILLIGRAM(S): 20 TABLET, DELAYED RELEASE ORAL at 17:31

## 2021-01-04 PROBLEM — I27.20 PULMONARY HYPERTENSION: Status: ACTIVE | Noted: 2020-01-01

## 2021-01-05 NOTE — HISTORY OF PRESENT ILLNESS
[FreeTextEntry1] : Mrs. Eric presents in scheduled f/u reporting that she is struggling to manage on her own is considering "long-term care".  Vision is impaired, primarily related need for second cataract procedure.  Balance is poor but she fears falling in will not shower alone.  Uses a walker "95% of the time.  She is breathless with moderate activity and bending.  She saw Dr. Villa related to fecal incontinence and now wears depends.\par \par Edema is unchanged despite increasing torsemide.  She is unable to put on the compression stockings  and has not been elevating her legs. \par No c/o chest, throat,jaw, arm or upper back discomfort.  No orthopnea or PND.  No palpitations, dizziness or syncope.  No  claudication.\par \par No anticoagulation related bleeding problems since switched to Eliquis.\par \par Appetite has improved however, however and she has gained weight.\par

## 2021-03-11 PROBLEM — I50.32 CHRONIC HEART FAILURE WITH PRESERVED EJECTION FRACTION: Status: ACTIVE | Noted: 2019-06-17

## 2021-03-11 PROBLEM — I34.0 NON-RHEUMATIC MITRAL REGURGITATION: Status: ACTIVE | Noted: 2017-01-16

## 2021-03-11 NOTE — HISTORY OF PRESENT ILLNESS
[FreeTextEntry1] : Mrs. Eric presents in scheduled f/u accompanied by her son. She reports that she is "falling apart". She is elevating her feet only nominally and edema has increased. Dr. Miranda advised increasing torsemide 20 mg twice daily she elected to change the dose to 20 mg in the AM mamd 40 mg in the PM.  Now ha a HHA during the day 3 times weekly.  Sleeps poorly, and naps during the day whenever she is not engaged.  \par \par Fecal incontinence remains trroublesome.\par No c/o chest, throat,jaw, arm or upper back discomfort.  No orthopnea or PND.  No palpitations, dizziness or syncope.  No  claudication.\par \par No anticoagulation related bleeding problems.\par

## 2021-03-18 NOTE — ED PROVIDER NOTE - NS ED ROS FT
Constitutional: +dizziness   Eyes:  No visual changes  ENMT:  No neck pain  Cardiac:  No chest pain  Respiratory:  +cough, +SOB  GI:  No nausea, vomiting, diarrhea, abdominal pain.  :  No dysuria, hematuria  MS:  No back pain.  Neuro:  No headache or lightheadedness  Skin:  No skin rash  Endocrine: No history of thyroid disease or diabetes.  Except as documented in the HPI,  all other systems are negative.

## 2021-03-18 NOTE — ED ADULT NURSE REASSESSMENT NOTE - NS ED NURSE REASSESS COMMENT FT1
Care assumed of patient at this time. Patient is encouraged to breath using her nose on the high itz NC.

## 2021-03-18 NOTE — ED ADULT NURSE NOTE - INTERVENTIONS DEFINITIONS
[place] to call system/Call bell, personal items and telephone within reach/Instruct patient to call for assistance/Physically safe environment: no spills, clutter or unnecessary equipment/Stretcher in lowest position, wheels locked, appropriate side rails in place/Monitor gait and stability/Monitor for mental status changes and reorient to person, place, and time/Reinforce activity limits and safety measures with patient and family

## 2021-03-18 NOTE — ED ADULT NURSE REASSESSMENT NOTE - NS ED NURSE REASSESS COMMENT FT1
14F indwelling mckoy catheter placed under sterile technique with 2 RNs at bedside. Pt tolerated procedure well, approx 100mL clear yellow urine with some sediment drained into collection bag. Pt repositioned to position of comfort, family at bedside, VSS at this time.  NAD.

## 2021-03-18 NOTE — ED ADULT TRIAGE NOTE - BP NONINVASIVE DIASTOLIC (MM HG)
51 Advancement Flap (Single) Text: The defect edges were debeveled with a #15 scalpel blade.  Given the location of the defect and the proximity to free margins a single advancement flap was deemed most appropriate.  Using a sterile surgical marker, an appropriate advancement flap was drawn incorporating the defect and placing the expected incisions within the relaxed skin tension lines where possible.    The area thus outlined was incised deep to adipose tissue with a #15 scalpel blade.  The skin margins were undermined to an appropriate distance in all directions utilizing iris scissors.

## 2021-03-18 NOTE — ED PROVIDER NOTE - ATTENDING CONTRIBUTION TO CARE
------------ATTENDING NOTE------------  pt w/ daughter in law c/o one week of increasing delirium / confusion, worsening sob, decreasing activity, concerning as hypoxic at doctor's office, hypoxic to 70s on room air in ED, improving w/ NRB, CXR w/o severe effusion (my bedside portable read), will trial high-flow NC, empiric antibiotic for LLE cellulitis, pt looks volume overloaded (peripheral edema) but likely intravascular low, no trauma per family, pt improving w/ oxygen, awaiting labs and close reassessments, discuss GOC w/ daughter in law who does not want intubation or CPR (DNR/DNI) but does not know if pt has a MOLST -->  - Gonzláez Resendiz MD   ----------------------------------------------

## 2021-03-18 NOTE — ED PROVIDER NOTE - PHYSICAL EXAMINATION
Vital signs reviewed  GENERAL: on non rebeeather satting 80s  HEAD: NCAT  EYES: Anicteric, perrla eomi  ENT: MMM  NECK: Supple, non tender  RESPIRATORY: tachypnic, R base crackles.   CARDIOVASCULAR: Regular rate and rhythm  ABDOMEN: Soft. Nondistended. Nontender. No guarding or rebound. No CVA tenderness.  MUSCULOSKELETAL/EXTREMITIES: 2+ pitting edema b/l. +left tibial wound with surrounding cellulitis   SKIN:  Warm and dry  NEURO: AAOx2. No gross FND.  PSYCHIATRIC: Cooperative. Affect appropriate.

## 2021-03-18 NOTE — ED ADULT NURSE NOTE - OBJECTIVE STATEMENT
89 yo F presents to ED A+Ox2 accompanied by daughter 89 yo F presents to ED A+Ox2 accompanied by daughter in low c/o low oxygen saturation. As per daughter in law, patient has been "more confused and forgetful" for the last few weeks. States patient was at doctor office earlier today for a routine office visit where she was found to have a low oxygen saturaion. 89 yo F presents to ED A+Ox2 accompanied by daughter in low c/o low oxygen saturation. As per daughter in law, patient has been "more confused and forgetful" for the last few weeks. Hospitals in [place] patient was at doctor office earlier today for a routine office visit where she was found to have a low oxygen saturation. Daughter in law states patient had difficulty ambulating due to weakness while leaving doctors office. Daughter in law states patient also reported having wound to her leg "over a month ago". Patient arrives to ED with breathing spontaneous labored and tachypneic with oxygen saturation in 70s. Patient placed on 15L NRB, breathing remains tachypneic and oxygen saturation improved to 90s. Respiratory therapy contacted to place patient on high flow nasal cannula. Patient noted to have bilateral lower extremity redness and swelling. Skin warm pink and dry. Moves all extremities.  EKG completed, patient bradycardic on cardiac monitor with rate in 50s. Bed in lowest position, side rails up. Daughter in law at bedside, updated on plan of care.

## 2021-03-18 NOTE — ED PROVIDER NOTE - OBJECTIVE STATEMENT
89F PMH AF (on coumadin), HTN, HLD p/w daughter in law c/o one week of increasing delirium / confusion, worsening sob, decreasing activity x 1 week. was seen at doctors office today found to have low oxygen, brought to ED by EMS. EMS states patient was satting 70s on RA and was started on NRB 10L. Pt saturation still in 80s.   denies cp, fever, n/v, abd pain, numbness

## 2021-03-18 NOTE — ED PROVIDER NOTE - CARE PLAN
Principal Discharge DX:	Acute respiratory failure with hypoxia  Secondary Diagnosis:	Peripheral edema  Secondary Diagnosis:	Cellulitis of left leg  Secondary Diagnosis:	Delirium

## 2021-03-18 NOTE — ED ADULT TRIAGE NOTE - LOCATION:
Notified of Hip fracture and Humerus fracture: Planning for surgery tomorrow with Dr. Arthur Jackson Right arm;

## 2021-03-19 NOTE — DISCHARGE NOTE PROVIDER - NSDCCPCAREPLAN_GEN_ALL_CORE_FT
PRINCIPAL DISCHARGE DIAGNOSIS  Diagnosis: Acute respiratory failure with hypoxia  Assessment and Plan of Treatment: You came to the hospital because you were short of breath and experiencing hypoxemic respiratory failure (a failure to maintain oxygen due to issues with your lungs). Your respiratory failure began due to "acute decompensated heart failure." The acute weakness in your heart resulted in fluid retention in your body that was ultimate was deposited in your lungs (pulmonary edema/pleural effusions) and legs (lower extremity swelling/edema). We started gave you diuretics (water pills) to removal of fluid and improve your symptoms. We also consulted our cardiology team to optimize your diuretic (water pill) regimen and assist in removal of excess fluid from your legs/lungs and restore your fluid balance. These efforts improved your breathing and resolved your leg swelling. To prevent additional episodes of decompensated heart failure, it is important that you continue to take your diuretic (water pill) and other heart failure medication as directed. Follow up with your cardiologist (Dr. Allen) within 1 week of discharge from the hospital to discuss ongoing cardiac care.   PLEASE RETURN TO THE EMERGENCY DEPARTMENT IF: You have signs/symptoms of hypoxia or decompensated heart failure including, but not limited to: significant leg swelling, shortness of breath, persistent or severe chest pain or pressure, palpitations, persistent or severely elevated heart rate, nausea, vomiting, significant abdominal pain, significantly high or low blood pressure, dizziness, worsening fatigue, an inability to lie flat, or any other sign/symptom you feel warrants immediate evaluation by a medical professional.       PRINCIPAL DISCHARGE DIAGNOSIS  Diagnosis: Acute respiratory failure with hypoxia  Assessment and Plan of Treatment: You came to the hospital because you were short of breath and experiencing hypoxemic respiratory failure (a failure to maintain oxygen due to issues with your lungs). Your respiratory failure began due to "acute decompensated heart failure."  We gave you diuretics (water pills) to removal of fluid and improve your symptoms. We also consulted our cardiology team to optimize your diuretic (water pill) regimen and assist in removal of excess fluid from your legs/lungs and restore your fluid balance. These efforts improved your breathing and resolved your leg swelling. to take your diuretic (water pill) and other heart failure medication as directed. Follow up with your cardiologist (Dr. Allen) within 1 week of discharge from the hospital to discuss ongoing cardiac care.   PLEASE RETURN TO THE EMERGENCY DEPARTMENT IF: You have signs/symptoms of hypoxia or decompensated heart failure including, but not limited to: significant leg swelling, shortness of breath, persistent or severe chest pain or pressure, palpitations, persistent or severely elevated heart rate, nausea, vomiting, significant abdominal pain, significantly high or low blood pressure, dizziness, worsening fatigue, an inability to lie flat, or any other sign/symptom you feel warrants immediate evaluation by a medical professional.       PRINCIPAL DISCHARGE DIAGNOSIS  Diagnosis: Acute respiratory failure with hypoxia  Assessment and Plan of Treatment: You came to the hospital because you were short of breath and experiencing hypoxemic respiratory failure (a failure to maintain oxygen due to issues with your lungs). Your respiratory failure began due to "acute decompensated heart failure."  We gave you diuretics (water pills) to removal of fluid and improve your symptoms. We also consulted our cardiology team to optimize your diuretic (water pill) regimen and assist in removal of excess fluid from your legs/lungs and restore your fluid balance. These efforts improved your breathing and resolved your leg swelling. to take your diuretic (water pill) and other heart failure medication as directed. Follow up with your cardiologist (Dr. Allen) within 1 week of discharge from the hospital to discuss ongoing cardiac care. check creatinine in 3-4 days at rehab. can increase Lasix if edema worsens or weight gain of more than one pound  PLEASE RETURN TO THE EMERGENCY DEPARTMENT IF: You have signs/symptoms of hypoxia or decompensated heart failure including, but not limited to: significant leg swelling, shortness of breath, persistent or severe chest pain or pressure, palpitations, persistent or severely elevated heart rate, nausea, vomiting, significant abdominal pain, significantly high or low blood pressure, dizziness, worsening fatigue, an inability to lie flat, or any other sign/symptom you feel warrants immediate evaluation by a medical professional.       PRINCIPAL DISCHARGE DIAGNOSIS  Diagnosis: Acute respiratory failure with hypoxia  Assessment and Plan of Treatment: You came to the hospital because you were short of breath and experiencing hypoxemic respiratory failure (a failure to maintain oxygen due to issues with your lungs). Your respiratory failure began due to "acute decompensated heart failure."  We gave you diuretics (water pills) to removal of fluid and improve your symptoms. We also consulted our cardiology team to optimize your diuretic (water pill) regimen and assist in removal of excess fluid from your legs/lungs and restore your fluid balance. These efforts improved your breathing and resolved your leg swelling. to take your diuretic (water pill) and other heart failure medication as directed. Follow up with your cardiologist (Dr. Allen) within 1 week of discharge from the hospital to discuss ongoing cardiac care. check creatinine in 3-4 days at rehab. can increase Lasix if edema worsens or weight gain of more than one pound  PLEASE RETURN TO THE EMERGENCY DEPARTMENT IF: You have signs/symptoms of hypoxia or decompensated heart failure including, but not limited to: significant leg swelling, shortness of breath, persistent or severe chest pain or pressure, palpitations, persistent or severely elevated heart rate, nausea, vomiting, significant abdominal pain, significantly high or low blood pressure, dizziness, worsening fatigue, an inability to lie flat, or any other sign/symptom you feel warrants immediate evaluation by a medical professional.      SECONDARY DISCHARGE DIAGNOSES  Diagnosis: Urinary retention  Assessment and Plan of Treatment: straight cath ATC    Diagnosis: Hematuria  Assessment and Plan of Treatment: apixaban d/c'd. monitor

## 2021-03-19 NOTE — DISCHARGE NOTE PROVIDER - NSDCFUADDAPPT_GEN_ALL_CORE_FT
[ ] follow up with cardiology within 1 week of discharge  [ ] follow up with primary care provider within 1 week of discharge [ ] follow up with cardiology within 1 week of discharge  [ ] follow up with primary care provider (Dr. González Miranda at [facility]) within 1 week of discharge to assess volume status and modify diuretic regimen as needed.

## 2021-03-19 NOTE — CONSULT NOTE ADULT - SUBJECTIVE AND OBJECTIVE BOX
CHIEF COMPLAINT: SOB    HPI:  Patient is a 89 y/o F with PMHx of Afib on eliquis, HTN, HLD who presents with SOB. Found to be hypoxic in ED to SpO2 70s and was placed on NRB and upgraded to HFNC. ABG on HFNC 100% shows pO2 of 422. Patient was given 40 mg of IV lasix and is now on room air.    PAST MEDICAL & SURGICAL HISTORY:  H/O cardiac arrhythmia    Atrial fibrillation    MVP (Mitral Valve Prolapse)    Osteoarthritis    Hypertension    Hyperlipidemia    History of right hip replacement    H/O dilation and curettage    Polyp of Ureter  removed many years ago    S/P Tonsillectomy  as child        FAMILY HISTORY:  FH: stroke        SOCIAL HISTORY:  Smoking: [ ] Never Smoked [ ] Former Smoker (__ packs x ___ years) [ ] Current Smoker  (__ packs x ___ years)  Substance Use: [ ] Never Used [ ] Used ____  EtOH Use:  Marital Status: [ ] Single [ ]  [ ]  [ ]   Sexual History:   Occupation:  Recent Travel:  Country of Birth:  Advance Directives:    Allergies    No Known Allergies    Intolerances        HOME MEDICATIONS:  Home Medications:  atorvastatin 10 mg oral tablet: 1 tab(s) orally once a day (19 Mar 2021 03:04)  Coreg 3.125 mg oral tablet: 1 tab(s) orally 2 times a day (19 Mar 2021 03:04)  Multiple Vitamins oral tablet: 1 tab(s) orally once a day (19 Mar 2021 03:04)  Oysco 500 (1250 mg calcium carbonate) oral tablet: 1 tab(s) orally once a day (19 Mar 2021 03:04)      REVIEW OF SYSTEMS:  Constitutional: [ ] negative [ ] fevers [ ] chills [ ] weight loss [ ] weight gain  HEENT: [ ] negative [ ] dry eyes [ ] eye irritation [ ] postnasal drip [ ] nasal congestion  CV: [ ] negative  [ ] chest pain [ ] orthopnea [ ] palpitations [ ] murmur  Resp: [ ] negative [ ] cough [ ] shortness of breath [ ] dyspnea [ ] wheezing [ ] sputum [ ] hemoptysis  GI: [ ] negative [ ] nausea [ ] vomiting [ ] diarrhea [ ] constipation [ ] abd pain [ ] dysphagia   : [ ] negative [ ] dysuria [ ] nocturia [ ] hematuria [ ] increased urinary frequency  Musculoskeletal: [ ] negative [ ] back pain [ ] myalgias [ ] arthralgias [ ] fracture  Skin: [ ] negative [ ] rash [ ] itch  Neurological: [ ] negative [ ] headache [ ] dizziness [ ] syncope [ ] weakness [ ] numbness  Psychiatric: [ ] negative [ ] anxiety [ ] depression  Endocrine: [ ] negative [ ] diabetes [ ] thyroid problem  Hematologic/Lymphatic: [ ] negative [ ] anemia [ ] bleeding problem  Allergic/Immunologic: [ ] negative [ ] itchy eyes [ ] nasal discharge [ ] hives [ ] angioedema  [ ] All other systems negative  [ ] Unable to assess ROS because ________    OBJECTIVE:  ICU Vital Signs Last 24 Hrs  T(C): 36.4 (19 Mar 2021 14:20), Max: 36.7 (18 Mar 2021 21:25)  T(F): 97.5 (19 Mar 2021 14:20), Max: 98.1 (18 Mar 2021 21:25)  HR: 65 (19 Mar 2021 14:20) (41 - 65)  BP: 118/66 (19 Mar 2021 14:20) (105/64 - 155/65)  BP(mean): 88 (18 Mar 2021 19:55) (88 - 92)  ABP: --  ABP(mean): --  RR: 18 (19 Mar 2021 14:20) (15 - 22)  SpO2: 100% (19 Mar 2021 14:20) (78% - 100%)         @ 07: @ 07:00  --------------------------------------------------------  IN: 0 mL / OUT: 700 mL / NET: -700 mL     @ 07:  -   @ 15:59  --------------------------------------------------------  IN: 360 mL / OUT: 0 mL / NET: 360 mL      CAPILLARY BLOOD GLUCOSE      POCT Blood Glucose.: 109 mg/dL (18 Mar 2021 17:26)      PHYSICAL EXAM:  General: awake and alert, nontoxic appearing *** lying in bed  HEENT: NC/AT, EOMI b/l, conjunctiva normal, MMM  Lymph Nodes: no cervical LAD  Neck: supple. full range of motion  Respiratory: CTA b/l, no w/r/c, appears comfortable on ***, no conversational dyspnea or accessory muscle use  Cardiovascular: S1 S2 present, RRR, no m/r/g  Abdomen: soft, NT/ND, +BS  Extremities: no c/c/e  Skin: no rashes or lesions noted  Neurological: AAOx3, no focal deficits  Psychiatry: calm, cooperative    LINES:     HOSPITAL MEDICATIONS:  Standing Meds:  apixaban 2.5 milliGRAM(s) Oral every 12 hours  ceFAZolin   IVPB 500 milliGRAM(s) IV Intermittent every 12 hours  ceFAZolin   IVPB          PRN Meds:      LABS:                        9.4    5.43  )-----------( 123      ( 19 Mar 2021 07:23 )             32.2     Hgb Trend: 9.4<--, 9.8<--      134<L>  |  97  |  51<H>  ----------------------------<  92  5.1   |  25  |  1.44<H>    Ca    9.5      19 Mar 2021 07:22  Mg     2.8         TPro  7.8  /  Alb  3.5  /  TBili  1.2  /  DBili  x   /  AST  50<H>  /  ALT  20  /  AlkPhos  190<H>      Creatinine Trend: 1.44<--, 1.34<--, 1.34<--    Urinalysis Basic - ( 18 Mar 2021 19:19 )    Color: Yellow / Appearance: Clear / S.016 / pH: x  Gluc: x / Ketone: Negative  / Bili: Negative / Urobili: 2 mg/dL   Blood: x / Protein: 30 mg/dL / Nitrite: Negative   Leuk Esterase: Negative / RBC: 7 /hpf / WBC 1 /HPF   Sq Epi: x / Non Sq Epi: 0 /hpf / Bacteria: Negative      Arterial Blood Gas:   @ 18:12  7.44/39/422/26/100/2.2  ABG lactate: --    Venous Blood Gas:   @ 17:37  7.34/56///  VBG Lactate: 1.7      MICROBIOLOGY:       RADIOLOGY:  [ ] Reviewed and interpreted by me    PULMONARY FUNCTION TESTS:    EKG: CHIEF COMPLAINT: SOB    HPI:  Patient is a 89 y/o F with PMHx of Afib on eliquis, HTN, HLD who presents with SOB. Patient reports she has been SOB chronically, however cannot remember specifically when it started, only that it has been gradually worsening. Takes a "water pill" as an outpatient, however was told by "someone" recently to stop taking it for a few days. Says that her legs have become more swollen over the past few days and she has dyspnea on minimal exertion. When she went to her doctor for evaluation, she was noted to be hypoxic on room air and sent to the ED for evaluation. Found to be hypoxic in ED to SpO2 70s and was placed on NRB and upgraded to HFNC. ABG on HFNC 100% shows pO2 of 422. Patient was given 40 mg of IV lasix and is now on room air. Pulmonary consulted for evaluation for pulmonary fibrosis. Patient had CT scan on 2019 which shows mosaic attenuation (likely 2/2 air trapping, though could also be pulmonary HTN) with mild bronchiectasis and apical scarring.    PAST MEDICAL & SURGICAL HISTORY:  H/O cardiac arrhythmia    Atrial fibrillation    MVP (Mitral Valve Prolapse)    Osteoarthritis    Hypertension    Hyperlipidemia    History of right hip replacement    H/O dilation and curettage    Polyp of Ureter  removed many years ago    S/P Tonsillectomy  as child        FAMILY HISTORY:  FH: stroke        SOCIAL HISTORY:  Smoking: remote smoking history  Substance Use: [ ] Never Used [ ] Used ____  EtOH Use:  Marital Status: [ ] Single [ ]  [ ]  [ ]   Sexual History:   Occupation:  Recent Travel:  Country of Birth:  Advance Directives:    Allergies    No Known Allergies    Intolerances        HOME MEDICATIONS:  Home Medications:  atorvastatin 10 mg oral tablet: 1 tab(s) orally once a day (19 Mar 2021 03:04)  Coreg 3.125 mg oral tablet: 1 tab(s) orally 2 times a day (19 Mar 2021 03:04)  Multiple Vitamins oral tablet: 1 tab(s) orally once a day (19 Mar 2021 03:04)  Oysco 500 (1250 mg calcium carbonate) oral tablet: 1 tab(s) orally once a day (19 Mar 2021 03:04)      REVIEW OF SYSTEMS:  Constitutional: [ ] negative [ ] fevers [ ] chills [ ] weight loss [x] weight gain  HEENT: [ ] negative [ ] dry eyes [ ] eye irritation [ ] postnasal drip [ ] nasal congestion  CV: [x] negative  [ ] chest pain [ ] orthopnea [ ] palpitations [ ] murmur  Resp: [ ] negative [ ] cough [x] shortness of breath [ ] dyspnea [ ] wheezing [ ] sputum [ ] hemoptysis  GI: [x] negative [ ] nausea [ ] vomiting [ ] diarrhea [ ] constipation [ ] abd pain [ ] dysphagia   : [x] negative [ ] dysuria [ ] nocturia [ ] hematuria [ ] increased urinary frequency  Musculoskeletal: [ ] negative [ ] back pain [ ] myalgias [ ] arthralgias [ ] fracture  Skin: [ ] negative [ ] rash [ ] itch  Neurological: [ ] negative [ ] headache [ ] dizziness [ ] syncope [ ] weakness [ ] numbness  Psychiatric: [ ] negative [ ] anxiety [ ] depression  Endocrine: [ ] negative [ ] diabetes [ ] thyroid problem  Hematologic/Lymphatic: [ ] negative [ ] anemia [ ] bleeding problem  Allergic/Immunologic: [ ] negative [ ] itchy eyes [ ] nasal discharge [ ] hives [ ] angioedema  [x] All other systems negative  [ ] Unable to assess ROS because ________    OBJECTIVE:  ICU Vital Signs Last 24 Hrs  T(C): 36.4 (19 Mar 2021 14:20), Max: 36.7 (18 Mar 2021 21:25)  T(F): 97.5 (19 Mar 2021 14:20), Max: 98.1 (18 Mar 2021 21:25)  HR: 65 (19 Mar 2021 14:20) (41 - 65)  BP: 118/66 (19 Mar 2021 14:20) (105/64 - 155/65)  BP(mean): 88 (18 Mar 2021 19:55) (88 - 92)  ABP: --  ABP(mean): --  RR: 18 (19 Mar 2021 14:20) (15 - 22)  SpO2: 100% (19 Mar 2021 14:20) (78% - 100%)         @ 07:01  -   @ 07:00  --------------------------------------------------------  IN: 0 mL / OUT: 700 mL / NET: -700 mL     @ 07:01  -   @ 15:59  --------------------------------------------------------  IN: 360 mL / OUT: 0 mL / NET: 360 mL      CAPILLARY BLOOD GLUCOSE      POCT Blood Glucose.: 109 mg/dL (18 Mar 2021 17:26)      PHYSICAL EXAM:  General: awake and alert, nontoxic appearing elderly female lying in bed  HEENT: NC/AT, EOMI b/l, conjunctiva normal, MMM  Lymph Nodes: no cervical LAD  Neck: supple, full range of motion, +JVD  Respiratory: b/l crackles and rales, no wheezing, appears comfortable on room air, no conversational dyspnea or accessory muscle use  Cardiovascular: S1 S2 present, regular rate, irregular rhythm, grade II/VI systolic murmur heart in mitral area  Abdomen: soft, NT/ND, +BS  Extremities: no c/c, 4+ LE pitting edema b/l  Skin: no rashes or lesions noted  Neurological: AAOx3, no focal deficits  Psychiatry: calm, cooperative    LINES:     HOSPITAL MEDICATIONS:  Standing Meds:  apixaban 2.5 milliGRAM(s) Oral every 12 hours  ceFAZolin   IVPB 500 milliGRAM(s) IV Intermittent every 12 hours  ceFAZolin   IVPB          PRN Meds:      LABS:                        9.4    5.43  )-----------( 123      ( 19 Mar 2021 07:23 )             32.2     Hgb Trend: 9.4<--, 9.8<--      134<L>  |  97  |  51<H>  ----------------------------<  92  5.1   |  25  |  1.44<H>    Ca    9.5      19 Mar 2021 07:22  Mg     2.8         TPro  7.8  /  Alb  3.5  /  TBili  1.2  /  DBili  x   /  AST  50<H>  /  ALT  20  /  AlkPhos  190<H>      Creatinine Trend: 1.44<--, 1.34<--, 1.34<--    Urinalysis Basic - ( 18 Mar 2021 19:19 )    Color: Yellow / Appearance: Clear / S.016 / pH: x  Gluc: x / Ketone: Negative  / Bili: Negative / Urobili: 2 mg/dL   Blood: x / Protein: 30 mg/dL / Nitrite: Negative   Leuk Esterase: Negative / RBC: 7 /hpf / WBC 1 /HPF   Sq Epi: x / Non Sq Epi: 0 /hpf / Bacteria: Negative      Arterial Blood Gas:   @ 18:12  7.44/39/422/26/100/2.2  ABG lactate: --    Venous Blood Gas:   @ 17:37  7.34/56/21/29/24  VBG Lactate: 1.7      MICROBIOLOGY:       RADIOLOGY:  [ ] Reviewed and interpreted by me    PULMONARY FUNCTION TESTS:    EKG:

## 2021-03-19 NOTE — H&P ADULT - HISTORY OF PRESENT ILLNESS
89F PMH AF (on coumadin), HTN, HLD p/w daughter in law c/o one week of increasing delirium / confusion, worsening sob, decreasing activity x 1 week. was seen at doctors office today found to have low oxygen, brought to ED by EMS. EMS states patient was satting 70s on RA and was started on NRB 10L. Pt saturation still in 80s 89F PMH AF (on coumadin), HTN, HLD p/w daughter in law c/o one week of increasing delirium / confusion, worsening sob, decreasing activity x 1 week. was seen at doctors office today found to have low oxygen, brought to ED by EMS. EMS states patient was satting 70s on RA and was started on NRB 10L. Pt saturation still in 80s. Reports that for the past 2 weeks been getting more sob w/ exertion. Reports cough and mucus coming up for the past few months. Denies fever, chills, dysuria, n/v/d, sick contact. Of note, reports being told recently to dc losartan and torsemide because of lab abnormality on 3/11. Memory problem sporadic, noticed for the past 2months. Much more for the past week. Daughter in law reports leg wound been there for several weeks. Patient said the wound actually looks better now than before.  89F PMH AF (on eliquis), HTN, HLD p/w daughter in law c/o one week of increasing delirium / confusion, worsening sob, decreasing activity x 1 week. was seen at doctors office today found to have low oxygen, brought to ED by EMS. EMS states patient was satting 70s on RA and was started on NRB 10L. Pt saturation still in 80s. Reports that for the past 2 weeks been getting more sob w/ exertion. Reports cough and mucus coming up for the past few months. Denies fever, chills, dysuria, n/v/d, sick contact. Of note, reports being told recently to dc losartan and torsemide because of lab abnormality on 3/11. Memory problem sporadic, noticed for the past 2months. Much more for the past week. Daughter in law reports leg wound been there for several weeks. Patient said the wound actually looks better now than before.

## 2021-03-19 NOTE — PHYSICAL THERAPY INITIAL EVALUATION ADULT - IMPAIRMENTS FOUND, PT EVAL
aerobic capacity/endurance/gait, locomotion, and balance/muscle strength aerobic capacity/endurance/gait, locomotion, and balance/integumentary integrity/muscle strength

## 2021-03-19 NOTE — H&P ADULT - ATTENDING COMMENTS
89F w/PMH AF (on eliquis), HTN, HLD p/w confusion abd  sob, x 1 week , recent d/c of diuretics , currently hypoxic , bradycardic ,  JVD present , basilar crackles , LLE  erythema ,  afebrile , labs w/o leukocytosis , hgb 9.8 , creatinine elevated , troponin  and bnp elevated , cxr clear , ekg afib w/ slow ventricular response; symptoms appear to coincide with recent discontinuation of patients diuretic , although degree of hypoxemia does not correlate with imaging and exam , will restart diuretics and monitor for clinical improvement ,  can wean oxygen as tolerated , will check DDimer and  if elevated after adjusting for age , can obtain V/Q scan; can treat for cellulitis of LE and obtain Duplex to r/o DVT

## 2021-03-19 NOTE — H&P ADULT - PROBLEM SELECTOR PLAN 4
on home coreg and eliquis  -holding coreg in the setting of bradycardia  -HF consult AM  -c/w eliquis

## 2021-03-19 NOTE — CONSULT NOTE ADULT - ASSESSMENT
This is a 89 y/o F with PMHx of Afib on eliquis, HTN, HLD who presents with SOB.    #  -     Bertrand Herrera PGY-5  Pulmonary/Critical Care Fellow  Pager: [phone] ([place]) [phone] ([place])  Pulmonary Spectra #[phone] ([place]) / [phone] ([place]) This is a 89 y/o F with PMHx of Afib on eliquis, HTN, HLD who presents with SOB. SOB appears chronic, however it has gotten worse in the past several days since she has stopped taking her diuretic. TTE from 2019 shows moderate MR and AI with severely dilated left and right atria and evidence of pulmonary HTN. CT scan from 2019 shows mild bronchiectasis and mosaic attenuation, likely 2/2 air trapping or pulmonary HTN. Patient's SOB likely related to pulmonary edema from heart failure and is now on room air. Still appears volume overloaded on exam.    #SOB  #Pulmonary HTN  #Acute decompensated heart failure  - continue diuretics as blood pressure allows  - f/u repeat TTE, though pulmonary HTN is almost certainly group II given valvular issues and dilated LA  - patient does not have evidence of fibrosis on CT chest from 2019 - has mild bronchiectasis with probably air trapping  - recommend twice a day nebs to promote airway clearance as patient has sputum production in the mornings  - no evidence of pneumonia on CXR  - flonase for post nasal drip    Pulmonary will sign off. Please call with any questions.    Bertrand Herrera PGY-5  Pulmonary/Critical Care Fellow  Pager: [phone] ([place]) [phone] ([place])  Pulmonary Spectra #[id number] ([place]) / [phone] ([place])

## 2021-03-19 NOTE — H&P ADULT - PROBLEM SELECTOR PLAN 1
acute on chronic HF vs infection (less likely given no focal consolidation on xray, procal unimpressive, change in cough) vs PE (less likely given on home eliquis)  -Will obtain repeat TTE (last one 2019 showed severe TR, decreased RV systolic function, moderate MR. LV systolic fx wnl)  -HF treatment as below  -Sputum culture if able to bring out sputum. Antibiotic to cover cellulitis  -wean O2 as tolerated  -consider duplex to r/o DVT if hypoxia persisted acute on chronic HF vs infection (less likely given no focal consolidation on xray, procal unimpressive, change in cough) vs PE (less likely given on home eliquis)  -Will obtain repeat TTE (last one 2019 showed severe TR, decreased RV systolic function, moderate MR. LV systolic fx wnl)  -HF treatment as below  -Sputum culture if able to bring out sputum. Antibiotic to cover cellulitis  -wean O2 as tolerated  -duplex to r/o DVT

## 2021-03-19 NOTE — PROGRESS NOTE ADULT - ASSESSMENT
89F PMH AF (on eliquis), HTN, HLD p/w daughter in law c/o one week of increasing delirium / confusion, worsening sob, decreasing activity x 1 week and found to have acute hypoxic respiratory failure and is admitted for further w/u 89F PMH AF (on eliquis), HTN, HLD p/w daughter in law c/o one week of increasing delirium / confusion, worsening sob, decreasing activity x 1 week and found to have acute hypoxic respiratory failure and is admitted for further w/u    -OP labs reveal recent Hb of 11s; now in 9s and pt endorses episodes of melena  -Hematuria in mckoy bag; possibly traumatic insertion  -c/w eliquis for now; monitor for bleeding (clinical or laboratory) 89F PMH AF (on eliquis), HTN, HLD p/w daughter in law c/o one week of increasing delirium / confusion, worsening sob, decreasing activity x 1 week and found to have acute hypoxic respiratory failure and is admitted for further w/u    -OP labs reveal recent Hb of 11s; now in 9s and pt endorses episodes of melena  -Hematuria in mckoy bag; possibly traumatic insertion  -c/w eliquis for now; monitor for bleeding (clinical or laboratory)  -CXR concerning for ILD  -Outpatient echo w/RV systolic dysfunction; pt w/signs of significant RV overload on exam (JVD; BL LE edema)  -Current clinical presentation c/f primary lung process driving pulmonary HTN  -Hold diuretics for now  -Pulm c/s; f/u results  -f/u infectious workup   -Leg minimally c/f cellulitis; monitor off antibiotics for now  -Weaned to room air on 3/19

## 2021-03-19 NOTE — PHYSICAL THERAPY INITIAL EVALUATION ADULT - MODALITIES TREATMENT COMMENTS
Pt windy PT WC eval well for L anterior tibial wound. Wound measured 2cm x 1.5cm x 0cm. No induration, no malodor, no erythema noted. L brachial /64 mmHG, L ankle /74 mmHG. JENNIFER: 1.15. Pt with capillary refill 1 sec, with strong L foot DP pulse. Wound cleansed with NS, cavilon applied to periwound, adaptic placed on wound, followed by gauze, secured with elly and tape, followed by ace wrap in figure 8 pattern with 50% stretch from forefoot to knee.

## 2021-03-19 NOTE — PHYSICAL THERAPY INITIAL EVALUATION ADULT - PERTINENT HX OF CURRENT PROBLEM, REHAB EVAL
89 y.o. F PMH AF, HTN, HLD p/w 1 week of inc'ing delirium / confusion, worsening sob, decreasing activity. At [facility] office found to have low oxygen, brought to ED by EMS. EMS states patient was satting 70s on RA & was started on NRB 10L. Pt saturation still in 80s. Reports ~2 weeks been getting more sob w/ exertion. Memory problem sporadic worse past week. Daughter in law reports leg wound been there for several weeks. Patient said the wound actually looks better now than before.

## 2021-03-19 NOTE — PROGRESS NOTE ADULT - ATTENDING COMMENTS
Hospitalist- Vince Mondragon MD  Pager: [phone]  If no response or off-hours, page [phone]  -------------------------------------  I personally performed the E/M service provided and supervised key portions of the service furnished by the resident/fellow. I agree with the history, physical and assessment/plan as stated above, with the following additional remarks:  #hypoxic respiratory failure- with sob progressive over the last few weeks, CXR with suspicion for ILD. Has been on AC and dopplers negative so low suspicion for PE. Also on ddx is ADHF. f/u high res CT chest non con. Obtain collateral info re; recent TTE/CT scans. Pending initial studies will consider pulm eval and repeat TTE. Received diuresis in ED, would hold further diuretics for now.

## 2021-03-19 NOTE — PROVIDER CONTACT NOTE (OTHER) - SITUATION
Pt [id number] on HFNC with respiratory distress, history of afib and HTN, consistently bradycardic to the mid -low 50s.

## 2021-03-19 NOTE — DISCHARGE NOTE PROVIDER - NSDCMRMEDTOKEN_GEN_ALL_CORE_FT
atorvastatin 10 mg oral tablet: 1 tab(s) orally once a day  Coreg 3.125 mg oral tablet: 1 tab(s) orally 2 times a day  Multiple Vitamins oral tablet: 1 tab(s) orally once a day  Oysco 500 (1250 mg calcium carbonate) oral tablet: 1 tab(s) orally once a day   atorvastatin 10 mg oral tablet: 1 tab(s) orally once a day  metoprolol succinate 50 mg oral tablet, extended release: 1 tab(s) orally once a day  Multiple Vitamins oral tablet: 1 tab(s) orally once a day  Oysco 500 (1250 mg calcium carbonate) oral tablet: 1 tab(s) orally once a day  torsemide 10 mg oral tablet: 1 dose(s) orally 2 times a day   acetaminophen 325 mg oral tablet: 2 tab(s) orally every 8 hours, As needed, Mild Pain (1 - 3), Moderate Pain (4 - 6)  albuterol 2.5 mg/3 mL (0.083%) inhalation solution:  inhaled   calcium carbonate 1250 mg (500 mg elemental calcium) oral tablet: 1 tab(s) orally once a day  fluticasone 50 mcg/inh nasal spray: 1 spray(s) nasal 2 times a day  furosemide 40 mg oral tablet: 1 tab(s) orally 2 times a day  heparin: 5000 unit(s) intramuscular every 8 hours  ipratropium-albuterol 0.5 mg-2.5 mg/3 mLinhalation solution: 3 milliliter(s) inhaled 2 times a day  lactulose 10 g/15 mL oral syrup: 30 milliliter(s) orally once a day, As needed, constipation  Multiple Vitamins oral tablet: 1 tab(s) orally once a day  pantoprazole 40 mg oral delayed release tablet: 1 tab(s) orally once a day  polyethylene glycol 3350 oral powder for reconstitution: 17 gram(s) orally once a day   acetaminophen 325 mg oral tablet: 2 tab(s) orally every 8 hours, As needed, Mild Pain (1 - 3), Moderate Pain (4 - 6)  furosemide 40 mg oral tablet: 1 tab(s) orally 2 times a day   heparin: 5000 unit(s) intramuscular every 8 hours  lactulose 10 g/15 mL oral syrup: 30 milliliter(s) orally once a day, As needed, constipation  pantoprazole 40 mg oral delayed release tablet: 1 tab(s) orally once a day  polyethylene glycol 3350 oral powder for reconstitution: 17 gram(s) orally once a day

## 2021-03-19 NOTE — PROGRESS NOTE ADULT - PROBLEM SELECTOR PLAN 3
no active discharge, but given erythema but recent worsening memory problem/decreased activity  -will treat cellulitis w/ cefazolin on home coreg and eliquis  -holding coreg in the setting of bradycardia  -HF consult AM  -c/w eliquis

## 2021-03-19 NOTE — PROGRESS NOTE ADULT - PROBLEM SELECTOR PLAN 4
on home coreg and eliquis  -holding coreg in the setting of bradycardia  -HF consult AM  -c/w eliquis Left leg; initial c/f cellulitis and pt is s/p cefazolin x2d  -low suspicion for cellulitis given absence of warmth/pain  -area is very poorly demarcated and is not indurated  -venous stasis ulcer is covered in clean based eschar w/o purulence  -ctm off antibiotics

## 2021-03-19 NOTE — PROGRESS NOTE ADULT - PROBLEM SELECTOR PLAN 2
follows with Dr. Allen outpt. Recently dc'ed losartan and torsemide. Currently fluid overloaded on exam  -HF consult AM  -will trial lasix 40 IV x1 now follows with Dr. Allen outpt. Recently dc'ed losartan and torsemide. Currently with peripheral fluid overload on exam, lower suspicion for pulm overload  - obtain collateral, recent TTE  - hold further diuresis for now  -will trial lasix 40 IV x1 now follows with Dr. Allen outpt. Recently dc'ed losartan and torsemide. Currently with peripheral fluid overload on exam, lower suspicion for pulm overload. Clinical picture c/w pulmonary HTN; possibly 2/2 primary lung process such as ILD  - Outpatient TTE w/moderate pulmonary HTN; RV systolic dysfunction  - hold further diuresis for now  - Significant JVD on exam

## 2021-03-19 NOTE — H&P ADULT - NSHPREVIEWOFSYSTEMS_GEN_ALL_CORE
CONSTITUTIONAL: No weakness, fevers or chills  EYES: No visual change  ENT: No vertigo or throat pain   NECK: No pain or stiffness  RESPIRATORY: No cough, wheezing, hemoptysis; No shortness of breath  CARDIOVASCULAR: No chest pain or palpitations  GASTROINTESTINAL: No abdominal or epigastric pain. No nausea, vomiting, or hematemesis; No diarrhea or constipation. No melena or hematochezia.  GENITOURINARY: No dysuria, frequency or hematuria  NEUROLOGICAL: No numbness or weakness  SKIN: No itching, rashes  MSK: no joint pain, full ROM  PSYCH: no anxiety no depression CONSTITUTIONAL: No weakness, fevers or chills  EYES: No visual change  ENT: No vertigo or throat pain   NECK: No pain or stiffness  RESPIRATORY: +cough, no wheezing, hemoptysis; +shortness of breath  CARDIOVASCULAR: No chest pain or palpitations. + swelling  GASTROINTESTINAL: No abdominal or epigastric pain. No nausea, vomiting, or hematemesis; No diarrhea or constipation. No melena or hematochezia.  GENITOURINARY: No dysuria, frequency or hematuria  NEUROLOGICAL: No numbness or weakness  SKIN: L LE wound   MSK: no joint pain, full ROM  PSYCH: no anxiety no depression

## 2021-03-19 NOTE — GOALS OF CARE CONVERSATION - ADVANCED CARE PLANNING - CONVERSATION DETAILS
Patient is AOx4 and has insight into her medical condition Patient is AOx4 and has insight into her medical condition. This discussion occurred with ED attending and myself at bedside.  When discussing if she would like to be intubated if her respiratory status does not improve, patient states that she does not want to be intubated.  When discussing if her heart where to stop, if chest compressions should be performed, patient states that she would like to die "peacefully in her sleep." States she discussed this with her son and daughter-in-law.

## 2021-03-19 NOTE — PHYSICAL THERAPY INITIAL EVALUATION ADULT - RISK REDUCTION/PREVENTION, PT EVAL
risk factors bowel sounds normal/nontender/soft/no bruit/no rebound tenderness/no rigidity/no guarding/no distention/no masses palpable

## 2021-03-19 NOTE — H&P ADULT - ASSESSMENT
89F PMH AF (on coumadin), HTN, HLD p/w daughter in law c/o one week of increasing delirium / confusion, worsening sob, decreasing activity x 1 week and found to have acute hypoxic respiratory failure and is admitted for further w/u 89F PMH AF (on eliquis), HTN, HLD p/w daughter in law c/o one week of increasing delirium / confusion, worsening sob, decreasing activity x 1 week and found to have acute hypoxic respiratory failure and is admitted for further w/u

## 2021-03-19 NOTE — DISCHARGE NOTE PROVIDER - NSDCCPTREATMENT_GEN_ALL_CORE_FT
PRINCIPAL PROCEDURE  Procedure: Transthoracic echocardiogram  Findings and Treatment: EF (Mallory Rule): 65 %Doppler Peak Velocity (m/sec):  AoV=1.6  Mitral Valve: Thickened mitral valve. Mild-moderate mitral  regurgitation.  Aortic Valve/Aorta: Normal trileaflet aortic valve. Peak  transaortic valve gradient equals 10 mm Hg, mean  transaortic valve gradient equals 5 mm Hg, aortic valve  velocity time integral equals 39 cm. Moderate aortic  regurgitation. Peak left ventricular outflow tract gradient  equals 3 mm Hg, mean gradient is equal to 1 mm Hg, LVOT  velocity time integral equals 21 cm.  Aortic Root: 3.4 cm.  LVOT diameter: 1.8 cm.  Left Atrium: Severely dilated left atrium.  LA volume index  = 104 cc/m2.  Left Ventricle: Normal left ventricular systolic function.  No segmental wall motion abnormalities. Increased relative  wall thickness with normal left ventricular mass index,  consistent with concentric left ventricular remodeling.  Right Heart: Severe right atrial enlargement. Severe Right  ventricular enlargement 5.8cm  with reduced  right  ventricular systolic function. Normal tricuspid valve.  Severe tricuspid regurgitation. Normal pulmonic valve.  Pericardium/Pleura: Small pericardial effusion. No  echocardiographic evidence of pericardial tamponade.  Hemodynamic: RAP 15 Estimated right ventricular systolic  pressure equals 55 mm Hg, assuming right atrial pressure  equals 15 mm Hg, consistent with moderate pulmonary  hypertension.  Conclusions:  1. Increased relative wall thickness with normal left  ventricular mass index, consistent with concentric left  ventricular remodeling.  2. Normal left ventricular systolic function. No segmental  wall motion abnormalities.  3. Severe right atrial enlargement.  4. Severe Right ventricular enlargement 5.8cm  with reduced   right ventricular systolic function.  5. Estimated right ventricular systolic pressure equals 55  mm Hg, assuming right atrial pressure equals 15 mmHg  consistent with moderate pulmonary hypertension  6. Normal tricuspid valve. Severe tricuspid regurg  7. Small pericardial effusion

## 2021-03-19 NOTE — H&P ADULT - PROBLEM SELECTOR PLAN 2
follows with Dr. Allen outpt. Recently dc'ed losartan and torsemide. Currently fluid overloaded on exam  -HF consult AM  -will give a dose of torsemide 20 x1 follows with Dr. Allen outpt. Recently dc'ed losartan and torsemide. Currently fluid overloaded on exam  -HF consult AM  -will trial lasix 40 IV x1 now

## 2021-03-19 NOTE — PROGRESS NOTE ADULT - PROBLEM SELECTOR PLAN 1
acute on chronic HF vs infection (less likely given no focal consolidation on xray, procal unimpressive, change in cough) vs PE (less likely given on home eliquis)  -Will obtain repeat TTE (last one 2019 showed severe TR, decreased RV systolic function, moderate MR. LV systolic fx wnl)  -HF treatment as below  -Sputum culture if able to bring out sputum. Antibiotic to cover cellulitis  -wean O2 as tolerated  -duplex to r/o DVT Possible ILD vs. acute on chronic HF vs infection (less likely given no focal consolidation on xray, procal unimpressive, change in cough) vs PE (less likely given on home eliquis)  - high res CT chest  -Will obtain repeat TTE (last one 2019 showed severe TR, decreased RV systolic function, moderate MR. LV systolic fx wnl)  -Sputum culture if able to bring out sputum. Antibiotic to cover cellulitis  -wean O2 as tolerated  -duplex to r/o DVT Possible ILD vs. acute on chronic HF vs infection (less likely given no focal consolidation on xray, procal unimpressive, change in cough) vs PE (less likely given on home eliquis)  -f/u high res CT chest  -Will obtain repeat TTE (last one 2019 showed severe TR, decreased RV systolic function, moderate MR. LV systolic fx wnl)  -wean O2 as tolerated  -DVT study is negative

## 2021-03-19 NOTE — PROGRESS NOTE ADULT - SUBJECTIVE AND OBJECTIVE BOX
PROGRESS NOTE:     CONTACT INFO:  Jama Pitt MD (Resident Physician - PGY-1 - Internal Medicine)  [email or web address]  Pager: [phone] (any site) or [phone] ([facility] only)    Patient is a 89y old  Female who presents with a chief complaint of SOB, AMS (19 Mar 2021 15:58)      SUBJECTIVE / OVERNIGHT EVENTS:  No acute events overnight. Patient seen and evaluated at bedside. No fever/chills.  Denies SOB at rest, chest pain, palpitations, abdominal pain, nausea/vomiting    ADDITIONAL REVIEW OF SYSTEMS:    MEDICATIONS  (STANDING):  apixaban 2.5 milliGRAM(s) Oral every 12 hours  ceFAZolin   IVPB 500 milliGRAM(s) IV Intermittent every 12 hours  ceFAZolin   IVPB        MEDICATIONS  (PRN):      CAPILLARY BLOOD GLUCOSE      POCT Blood Glucose.: 109 mg/dL (18 Mar 2021 17:26)    I&O's Summary    18 Mar 2021 07:01  -  19 Mar 2021 07:00  --------------------------------------------------------  IN: 0 mL / OUT: 700 mL / NET: -700 mL    19 Mar 2021 07:01  -  19 Mar 2021 16:17  --------------------------------------------------------  IN: 360 mL / OUT: 0 mL / NET: 360 mL        PHYSICAL EXAM:  Vital Signs Last 24 Hrs  T(C): 36.4 (19 Mar 2021 14:20), Max: 36.7 (18 Mar 2021 21:25)  T(F): 97.5 (19 Mar 2021 14:20), Max: 98.1 (18 Mar 2021 21:25)  HR: 65 (19 Mar 2021 14:20) (41 - 65)  BP: 118/66 (19 Mar 2021 14:20) (105/64 - 155/65)  BP(mean): 88 (18 Mar 2021 19:55) (88 - 92)  RR: 18 (19 Mar 2021 14:20) (15 - 22)  SpO2: 100% (19 Mar 2021 14:20) (78% - 100%)    GENERAL: NAD, well-developed, alert, resting comfortably  HEAD:  Atraumatic, Normocephalic  EYES: EOMI, PERRLA, conjunctiva and sclera clear  NECK: Supple, +JVD  CHEST/LUNG: bibasilar crackles, No wheeze, ronchi or rales  HEART: irregular rate and irregular rhythm; +systolic murmurs, no rubs, or gallops  ABDOMEN: Soft, Nontender, Nondistended; Bowel sounds present  EXTREMITIES:  2+ Peripheral Pulses, 2-3+ edema up to thigh b/l  PSYCH: AAOx3, normal behavior, normal affect  NEUROLOGY: non-focal, normal strength, normal sensation  SKIN: LLE shallow skin wound with surrounding erythema, no active discharge, nontender    LABS:                        9.4    5.43  )-----------( 123      ( 19 Mar 2021 07:23 )             32.2     03-19    134<L>  |  97  |  51<H>  ----------------------------<  92  5.1   |  25  |  1.44<H>    Ca    9.5      19 Mar 2021 07:22  Mg     2.8     -    TPro  7.8  /  Alb  3.5  /  TBili  1.2  /  DBili  x   /  AST  50<H>  /  ALT  20  /  AlkPhos  190<H>  03-18          Urinalysis Basic - ( 18 Mar 2021 19:19 )    Color: Yellow / Appearance: Clear / S.016 / pH: x  Gluc: x / Ketone: Negative  / Bili: Negative / Urobili: 2 mg/dL   Blood: x / Protein: 30 mg/dL / Nitrite: Negative   Leuk Esterase: Negative / RBC: 7 /hpf / WBC 1 /HPF   Sq Epi: x / Non Sq Epi: 0 /hpf / Bacteria: Negative          RADIOLOGY & ADDITIONAL TESTS:  Results Reviewed:   Imaging Personally Reviewed:  Electrocardiogram Personally Reviewed:    COORDINATION OF CARE:  Care Discussed with Consultants/Other Providers [Y/N]:  Prior or Outpatient Records Reviewed [Y/N]:   PROGRESS NOTE:     CONTACT INFO:  Jama Pitt MD (Resident Physician - PGY-1 - Internal Medicine)  [email or web address]  Pager: [phone] (any site) or [phone] ([facility] only)    Patient is a 89y old  Female who presents with a chief complaint of SOB, AMS (19 Mar 2021 15:58)      SUBJECTIVE / OVERNIGHT EVENTS: Admitted o/n for acute hypoxemic respiratory failure. Patient seen and evaluated at bedside. On HFNC and satting 100%. No fever/chills. Denies SOB at rest, chest pain, palpitations, abdominal pain, nausea/vomiting. Chart review significant for recent hemoglobin of mid 11s; pt endorsing episodes of black stools c/f melena and is now anemic to 9s. LE edema reported as near baseline. LLE swelling/erythema appears unremarkable for infection, but pt's primary dr has expressed c/f cellulitis. Pt titrated to RA over morning and is saturating >92%. CXR c/f fibrosis of the lungs. Most recent outpatient echo w/right sided systolic dysfunction and moderate pulmonary HTN    ADDITIONAL REVIEW OF SYSTEMS:    MEDICATIONS  (STANDING):  apixaban 2.5 milliGRAM(s) Oral every 12 hours  ceFAZolin   IVPB 500 milliGRAM(s) IV Intermittent every 12 hours  ceFAZolin   IVPB        MEDICATIONS  (PRN):      CAPILLARY BLOOD GLUCOSE      POCT Blood Glucose.: 109 mg/dL (18 Mar 2021 17:26)    I&O's Summary    18 Mar 2021 07:  -  19 Mar 2021 07:00  --------------------------------------------------------  IN: 0 mL / OUT: 700 mL / NET: -700 mL    19 Mar 2021 07:01  -  19 Mar 2021 16:17  --------------------------------------------------------  IN: 360 mL / OUT: 0 mL / NET: 360 mL        PHYSICAL EXAM:  Vital Signs Last 24 Hrs  T(C): 36.4 (19 Mar 2021 14:20), Max: 36.7 (18 Mar 2021 21:25)  T(F): 97.5 (19 Mar 2021 14:20), Max: 98.1 (18 Mar 2021 21:25)  HR: 65 (19 Mar 2021 14:20) (41 - 65)  BP: 118/66 (19 Mar 2021 14:20) (105/64 - 155/65)  BP(mean): 88 (18 Mar 2021 19:55) (88 - 92)  RR: 18 (19 Mar 2021 14:20) (15 - 22)  SpO2: 100% (19 Mar 2021 14:20) (78% - 100%)    GENERAL: NAD, well-developed, alert, resting comfortably. Wearing HFNC at time of AM encounter  HEAD:  Atraumatic, Normocephalic  EYES: EOMI, PERRLA, conjunctiva and sclera clear  NECK: Supple, +significant JVD  CHEST/LUNG: Diffuse crackles in all auscultated fields. No wheeze, ronchi or rales  HEART: Irregular rate and irregular rhythm; +systolic murmurs, no rubs, or gallops  ABDOMEN: Soft, Nontender, Nondistended; Bowel sounds present  EXTREMITIES:  2+ Peripheral Pulses, 2+ edema up to thigh b/l  PSYCH: AAOx3, normal behavior, normal affect  NEUROLOGY: non-focal, normal strength, normal sensation  SKIN: LLE shallow skin wound with surrounding erythema, no active discharge, nontender    LABS:                        9.4    5.43  )-----------( 123      ( 19 Mar 2021 07:23 )             32.2     -    134<L>  |  97  |  51<H>  ----------------------------<  92  5.1   |  25  |  1.44<H>    Ca    9.5      19 Mar 2021 07:22  Mg     2.8     -    TPro  7.8  /  Alb  3.5  /  TBili  1.2  /  DBili  x   /  AST  50<H>  /  ALT  20  /  AlkPhos  190<H>  03-18          Urinalysis Basic - ( 18 Mar 2021 19:19 )    Color: Yellow / Appearance: Clear / S.016 / pH: x  Gluc: x / Ketone: Negative  / Bili: Negative / Urobili: 2 mg/dL   Blood: x / Protein: 30 mg/dL / Nitrite: Negative   Leuk Esterase: Negative / RBC: 7 /hpf / WBC 1 /HPF   Sq Epi: x / Non Sq Epi: 0 /hpf / Bacteria: Negative          RADIOLOGY & ADDITIONAL TESTS:  Results Reviewed:   Imaging Personally Reviewed:  Electrocardiogram Personally Reviewed:    COORDINATION OF CARE:  Care Discussed with Consultants/Other Providers [Y/N]:  Prior or Outpatient Records Reviewed [Y/N]:   PROGRESS NOTE:     CONTACT INFO:  Jama Pitt MD (Resident Physician - PGY-1 - Internal Medicine)  [email or web address]  Pager: [phone] (any site) or [phone] ([facility] only)    Patient is a 89y old  Female who presents with a chief complaint of SOB, AMS (19 Mar 2021 15:58)      SUBJECTIVE / OVERNIGHT EVENTS: Admitted o/n for acute hypoxemic respiratory failure. Patient seen and evaluated at bedside. On HFNC and satting 100%. No fever/chills. Denies SOB at rest, chest pain, palpitations, abdominal pain, nausea/vomiting. Chart review significant for recent hemoglobin of mid 11s; pt endorsing episodes of black stools c/f melena and is now anemic to 9s. LE edema reported as near baseline. LLE swelling/erythema appears unremarkable for infection, but pt's primary dr has expressed c/f cellulitis. Pt titrated to RA over morning and is saturating >92%. CXR c/f fibrosis of the lungs. Most recent outpatient echo w/right sided systolic dysfunction and moderate pulmonary HTN    ADDITIONAL REVIEW OF SYSTEMS:    MEDICATIONS  (STANDING):  apixaban 2.5 milliGRAM(s) Oral every 12 hours  ceFAZolin   IVPB 500 milliGRAM(s) IV Intermittent every 12 hours  ceFAZolin   IVPB        MEDICATIONS  (PRN):      CAPILLARY BLOOD GLUCOSE      POCT Blood Glucose.: 109 mg/dL (18 Mar 2021 17:26)    I&O's Summary    18 Mar 2021 07:  -  19 Mar 2021 07:00  --------------------------------------------------------  IN: 0 mL / OUT: 700 mL / NET: -700 mL    19 Mar 2021 07:01  -  19 Mar 2021 16:17  --------------------------------------------------------  IN: 360 mL / OUT: 0 mL / NET: 360 mL        PHYSICAL EXAM:  Vital Signs Last 24 Hrs  T(C): 36.4 (19 Mar 2021 14:20), Max: 36.7 (18 Mar 2021 21:25)  T(F): 97.5 (19 Mar 2021 14:20), Max: 98.1 (18 Mar 2021 21:25)  HR: 65 (19 Mar 2021 14:20) (41 - 65)  BP: 118/66 (19 Mar 2021 14:20) (105/64 - 155/65)  BP(mean): 88 (18 Mar 2021 19:55) (88 - 92)  RR: 18 (19 Mar 2021 14:20) (15 - 22)  SpO2: 100% (19 Mar 2021 14:20) (78% - 100%)    GENERAL: Severe cachexia. NAD; alert, resting comfortably. Wearing HFNC at time of AM encounter  HEAD:  Atraumatic, Normocephalic  EYES: EOMI, PERRLA, conjunctiva and sclera clear  NECK: Supple, +significant JVD  CHEST/LUNG: Diffuse crackles in all auscultated fields. No wheeze, ronchi or rales  HEART: Irregular rate and irregular rhythm; +systolic murmurs, no rubs, or gallops  ABDOMEN: Soft, Nontender, Nondistended; Bowel sounds present  EXTREMITIES:  2+ Peripheral Pulses, 2+ edema up to thigh b/l  PSYCH: AAOx3, normal behavior, normal affect  NEUROLOGY: non-focal, normal strength, normal sensation  SKIN: LLE shallow skin wound with mild, poorly demarcated surrounding erythema. Area is not warm to touch vs. contralateral leg. No active discharge, nontender    LABS:                        9.4    5.43  )-----------( 123      ( 19 Mar 2021 07:23 )             32.2     -    134<L>  |  97  |  51<H>  ----------------------------<  92  5.1   |  25  |  1.44<H>    Ca    9.5      19 Mar 2021 07:22  Mg     2.8         TPro  7.8  /  Alb  3.5  /  TBili  1.2  /  DBili  x   /  AST  50<H>  /  ALT  20  /  AlkPhos  190<H>  18          Urinalysis Basic - ( 18 Mar 2021 19:19 )    Color: Yellow / Appearance: Clear / S.016 / pH: x  Gluc: x / Ketone: Negative  / Bili: Negative / Urobili: 2 mg/dL   Blood: x / Protein: 30 mg/dL / Nitrite: Negative   Leuk Esterase: Negative / RBC: 7 /hpf / WBC 1 /HPF   Sq Epi: x / Non Sq Epi: 0 /hpf / Bacteria: Negative          RADIOLOGY & ADDITIONAL TESTS:  Results Reviewed:   Imaging Personally Reviewed:  Electrocardiogram Personally Reviewed:    COORDINATION OF CARE:  Care Discussed with Consultants/Other Providers [Y/N]:  Prior or Outpatient Records Reviewed [Y/N]:

## 2021-03-19 NOTE — H&P ADULT - NSHPPHYSICALEXAM_GEN_ALL_CORE
VITALS: T(C): 36.6 (03-18-21 @ 23:34), Max: 36.7 (03-18-21 @ 21:25)  HR: 52 (03-18-21 @ 23:34) (41 - 60)  BP: 136/59 (03-18-21 @ 23:34) (124/51 - 155/65)  RR: 18 (03-18-21 @ 23:34) (15 - 22)  SpO2: 100% (03-18-21 @ 23:34) (78% - 100%)  GENERAL: NAD, well-developed, alert, resting comfortably  HEAD:  Atraumatic, Normocephalic  EYES: EOMI, PERRLA, conjunctiva and sclera clear  NECK: Supple, No JVD  CHEST/LUNG: Clear to auscultation bilaterally; No wheeze, ronchi or rales  HEART: Regular rate and rhythm; No murmurs, rubs, or gallops  ABDOMEN: Soft, Nontender, Nondistended; Bowel sounds present  EXTREMITIES:  2+ Peripheral Pulses, No clubbing, cyanosis, or edema  PSYCH: AAOx3, normal behavior, normal affect  NEUROLOGY: non-focal, normal strength, normal sensation  SKIN: No rashes or lesions VITALS: T(C): 36.6 (03-18-21 @ 23:34), Max: 36.7 (03-18-21 @ 21:25)  HR: 52 (03-18-21 @ 23:34) (41 - 60)  BP: 136/59 (03-18-21 @ 23:34) (124/51 - 155/65)  RR: 18 (03-18-21 @ 23:34) (15 - 22)  SpO2: 100% (03-18-21 @ 23:34) (78% - 100%)  GENERAL: NAD, well-developed, alert, resting comfortably  HEAD:  Atraumatic, Normocephalic  EYES: EOMI, PERRLA, conjunctiva and sclera clear  NECK: Supple, +JVD  CHEST/LUNG: bibasilar crackles, No wheeze, ronchi or rales  HEART: irregular rate and irregular rhythm; +systolic murmurs, no rubs, or gallops  ABDOMEN: Soft, Nontender, Nondistended; Bowel sounds present  EXTREMITIES:  2+ Peripheral Pulses, 2-3+ edema up to thigh b/l  PSYCH: AAOx3, normal behavior, normal affect  NEUROLOGY: non-focal, normal strength, normal sensation  SKIN: LLE shallow skin wound with surrounding erythema, no active discharge, nontender

## 2021-03-19 NOTE — PHYSICAL THERAPY INITIAL EVALUATION ADULT - ADDITIONAL COMMENTS
Pt lives alone in a pvt apartment, no steps to negotiate. PTA pt reports being independent w/ mobility utilizing a rollator, has [facility]HA 4 hr/day to assist w/ ADL's.

## 2021-03-19 NOTE — DISCHARGE NOTE PROVIDER - HOSPITAL COURSE
HPI: 89F PMH AF (on eliquis), HTN, HLD, HFpEF, PulmHTN p/w daughter in law c/o one week of increasing delirium / confusion, worsening sob, decreasing activity x 1 week and found to have acute hypoxic respiratory failure and is admitted for further w/u. Given IV Lasix and cefazolin w/HFNC in ED due to c/f acute decompensated heart failure and possible LLE cellulitis. Admitting team not c/f cellulitis and dropped cefazolin. Back on room air after 24h and stable; stopped diuresis due to c/f primary lung process driving pathology and awaiting pulm input. She then became hypoxic overnight on hospital day 1 and required O2 again. 3/20 AM physical showing increasing hypervolemia. Echo c/w prior (moderate to severe pulmHTN, severe TR). Cards c/s w/recs to restart diuresis w/IV lasix 40mg BID.....           HPI: 89F PMH AF (on eliquis), HTN, HLD, HFpEF, PulmHTN p/w daughter in law c/o one week of increasing delirium / confusion, worsening sob, decreasing activity x 1 week and found to have acute hypoxic respiratory failure and is admitted for further w/u. Given IV Lasix and cefazolin w/HFNC in ED due to c/f acute decompensated heart failure and possible LLE cellulitis. Admitting team not c/f cellulitis and dropped cefazolin. Back on room air after 24h and stable; stopped diuresis due to c/f primary lung process driving pathology and awaiting pulm input. She then became hypoxic overnight on hospital day 1 and required O2 again. 3/20 AM physical showing increasing hypervolemia. Echo c/w prior (moderate to severe pulmHTN, severe TR). Cards c/s w/recs to restart diuresis w/IV lasix 40mg BID with adequate UO and resolution of MANJINDER and improved edema. Transitioned to po lasix 40 BID (started 3/24 evening). Mckoy was removed - TOV today. Pt is spo2 wnl on room air, no oxygen requirements. Will discharge to rehab (pt and family in agreement with rehab).       To do in rehab:   [ ] monitor pt's volume status, daily weights, UO on lasix 40mg PO BID. Modify regimen as tolerated. Coreg 3.125 BID was discontinued for bradycardia. Can restart if HR consistently wnl. Ensure adequate PO intake, avoid dehydration.   [ ] pt is off eliquis for hematuria, reported episodes melena in hospital (has not occurred within last few days) - cardiology in agreement to discontinue anticoagulation as risk outweighs benefits. family aware.   [ ] pt had mckoy in hospital -> trial of void 3/24 -> ensure she is not retaining      mental status: pt is A&O x3, no delirium/confusion           HPI: 89F pleasant woman PMH AF (on eliquis), HTN, HLD, HFpEF, PulmHTN p/w daughter in law c/o one week of increasing delirium / confusion, worsening sob, decreasing activity x 1 week and found to have acute hypoxic respiratory failure and is admitted for further w/u. Given IV Lasix and cefazolin w/HFNC in ED due to c/f acute decompensated heart failure and possible LLE cellulitis. Admitting team not c/f cellulitis and dropped cefazolin. Back on room air after 24h and stable; stopped diuresis due to c/f primary lung process driving pathology and awaiting pulm input. She then became hypoxic overnight on hospital day 1 and required O2 again. 3/20 AM physical showing increasing hypervolemia. Echo c/w prior (moderate to severe pulmHTN, severe TR). Cards c/s w/recs to restart diuresis w/IV lasix 40mg BID with adequate UO and resolution of MANJINDER and improved edema. Transitioned to po lasix 40 BID (started 3/24 evening). Pt failed TOV - will go to rehab with leelee. Pt is spo2 wnl on room air, no oxygen requirements. Will discharge to rehab (pt and family in agreement with rehab).       To do in rehab:   [ ] monitor pt's volume status, daily weights, UO on lasix 40mg PO BID. Give additional lasix if weight increases or pt appears more volume overloaded. Coreg 3.125 BID was discontinued for bradycardia. Can restart if HR consistently wnl. Ensure adequate PO intake, avoid dehydration.   [ ] pt is off eliquis for hematuria, reported episodes melena in hospital (has not occurred within last few days) - cardiology in agreement to discontinue anticoagulation as risk outweighs benefits. family aware and in agreement.   [ ] pt had mckoy in hospital -> failed TOV 3/25 - will discharge to rehab with leelee, please do trial of void in rehab to minimize infection risk/improve quality of life.       mental status: pt is A&O x3, no delirium/confusion, very pleasant            89F  woman PMH AF (on eliquis), HTN, HLD, HFpEF, PulmHTN p/w daughter in law c/o one week of increasing delirium / confusion, worsening sob, decreasing activity x 1 week and found to have acute hypoxic respiratory failure. Given IV Lasix and cefazolin w/HFNC in ED due to c/f acute decompensated heart failure and possible LLE cellulitis. Admitting team not c/f cellulitis and dropped cefazolin.  Cards c/s w/recs to start diuresis w/IV lasix 40mg BID with adequate UO and resolution of MANJINDER and improved edema. Transitioned to po lasix 40 BID (started 3/24 evening). Pt failed TOV - will go to rehab with mckoy. Pt is spo2 wnl on room air, no oxygen requirements. Will discharge to rehab (pt and family in agreement with rehab).       To do in rehab:   [ ] monitor pt's volume status, daily weights, UO on lasix 40mg PO BID. Give additional lasix if weight increases or pt appears more volume overloaded. Coreg 3.125 BID was discontinued for bradycardia. Can restart if HR consistently wnl. Ensure adequate PO intake, avoid dehydration.   [ ] pt is off eliquis for hematuria, reported episodes melena in hospital (has not occurred within last few days) - cardiology in agreement to discontinue anticoagulation as risk outweighs benefits. family aware and in agreement.  h/h has been stable on DVT prophylaxis.   [ ] pt had mckoy in hospital -> failed TOV 3/25 - will discharge to rehab with mckoy, please do trial of void in rehab to minimize infection risk/improve quality of life.       mental status: pt is A&O x3, no delirium/confusion, very pleasant            89F  woman PMH AF (on eliquis), HTN, HLD, HFpEF, PulmHTN p/w daughter in law c/o one week of increasing delirium / confusion, worsening sob, decreasing activity x 1 week and found to have acute hypoxic respiratory failure. Given IV Lasix and cefazolin w/HFNC in ED due to c/f acute decompensated heart failure and possible LLE cellulitis. Admitting team not c/f cellulitis and dropped cefazolin.  Cards c/s w/recs to start diuresis w/IV lasix 40mg BID with adequate UO and resolution of MANJINDER and improved edema. Transitioned to po lasix 40 BID (started 3/24 evening). Pt failed TOV - will go to rehab with leelee. Pt is spo2 wnl on room air, no oxygen requirements. Will discharge to rehab (pt and family in agreement with rehab).       To do in rehab:   [ ] monitor pt's volume status, daily weights, UO on lasix 40mg PO BID. Give additional lasix if weight increases or pt appears more volume overloaded. Check creatinine in 3-4 days (1.2 on discharge). Coreg 3.125 BID was discontinued for bradycardia. Can restart if HR consistently wnl. Ensure adequate PO intake, avoid dehydration.   [ ] pt is off eliquis for hematuria, reported episodes melena in hospital (has not occurred within last few days) - cardiology in agreement to discontinue anticoagulation as risk outweighs benefits. family aware and in agreement.  h/h has been stable on DVT prophylaxis.   [ ] pt had mckoy in hospital -> failed TOV 3/25 - will discharge to rehab with mckoy, please do trial of void in rehab to minimize infection risk/improve quality of life.       mental status: pt is A&O x3, no delirium/confusion, very pleasant            89F  woman PMH AF (on eliquis), HTN, HLD, HFpEF, PulmHTN p/w daughter in law c/o one week of increasing delirium / confusion, worsening sob, decreasing activity x 1 week and found to have acute hypoxic respiratory failure. Given IV Lasix and cefazolin w/HFNC in ED due to c/f acute decompensated heart failure and possible LLE cellulitis. Admitting team not c/f cellulitis and dropped cefazolin.  Cards c/s w/recs to start diuresis w/IV lasix 40mg BID with adequate UO and resolution of MANJINDER and improved edema. Transitioned to po lasix 40 BID (started 3/24 evening). Pt failed TOV - will go to rehab with leelee. Pt is spo2 wnl on room air, no oxygen requirements. Will discharge to rehab (pt and family in agreement with rehab).       To do in rehab:   [ ] monitor pt's volume status, daily weights, UO on lasix 40mg PO BID. Give additional lasix if weight increases or pt appears more volume overloaded. Check creatinine in 3-4 days (1.2 on discharge). Coreg 3.125 BID was discontinued for bradycardia. Can restart if HR consistently wnl. Ensure adequate PO intake, avoid dehydration.   [ ] pt is off eliquis for hematuria, reported episodes melena in hospital (has not occurred within last few days) - cardiology in agreement to discontinue anticoagulation as risk outweighs benefits. family aware and in agreement.  h/h has been stable on DVT prophylaxis.   [ ] pt had mckoy in hospital -> failed TOV 3/25 - will discharge to rehab with mckoy, please do trial of void in rehab to minimize infection risk/improve quality of life. recommend staright cath q 6 hrs      mental status: pt is A&O x3, no delirium/confusion, very pleasant

## 2021-03-19 NOTE — PHYSICAL THERAPY INITIAL EVALUATION ADULT - PLANNED THERAPY INTERVENTIONS, PT EVAL
bed mobility training/gait training/transfer training GOAL: WC MANAGEMENT/bed mobility training/gait training/transfer training

## 2021-03-19 NOTE — DISCHARGE NOTE PROVIDER - NSDCFUADDINST_GEN_ALL_CORE_FT
To do in rehab:   [ ] monitor pt's volume status, daily weights, UO on lasix 40mg PO BID. Give additional lasix if weight increases or pt appears more volume overloaded. Coreg 3.125 BID was discontinued for bradycardia. Can restart if HR consistently wnl. Ensure adequate PO intake, avoid dehydration.     [ ] pt is off eliquis for hematuria, reported episodes melena in hospital (has not occurred within last few days) - cardiology in agreement to discontinue anticoagulation as risk outweighs benefits. family aware and in agreement.     [ ] pt had mckoy in hospital -> failed TOV 3/25 - will discharge to rehab with mckoy, please do trial of void in rehab to minimize infection risk/improve quality of life.

## 2021-03-19 NOTE — PATIENT PROFILE ADULT - STATED REASON FOR ADMISSION
Have been feeling weaker and weaker, then I had a period of memory lapse/concentration loss and then I had difficulty breathing.

## 2021-03-19 NOTE — H&P ADULT - PROBLEM SELECTOR PLAN 3
no active discharge, but given erythema but recent worsening memory problem/decreased activity  -will treat cellulitis no active discharge, but given erythema but recent worsening memory problem/decreased activity  -will treat cellulitis w/ cefazolin

## 2021-03-20 NOTE — PROGRESS NOTE ADULT - ASSESSMENT
89F PMH AF (on eliquis), HTN, HLD p/w daughter in law c/o one week of increasing delirium / confusion, worsening sob, decreasing activity x 1 week and found to have acute hypoxic respiratory failure and is admitted for further w/u    -OP labs reveal recent Hb of 11s; now in 9s and pt endorses episodes of melena  -Hematuria in mckoy bag; possibly traumatic insertion  -c/w eliquis for now; monitor for bleeding (clinical or laboratory)  -CXR concerning for ILD  -Outpatient echo w/RV systolic dysfunction; pt w/signs of significant RV overload on exam (JVD; BL LE edema)  -Current clinical presentation c/f primary lung process driving pulmonary HTN  -Hold diuretics for now  -Pulm c/s; f/u results  -f/u infectious workup   -Leg minimally c/f cellulitis; monitor off antibiotics for now  -Weaned to room air on 3/19 89F PMH AF (on eliquis), HTN, HLD p/w daughter in law c/o one week of increasing delirium / confusion, worsening sob, decreasing activity x 1 week and found to have acute hypoxic respiratory failure and is admitted for further w/u    -Current clinical presentation c/f primary lung process driving pulmonary HTN vs. RV failure  -CT chest ordered; prior from 2019 w/o ILD  -Outpatient echo w/RV systolic dysfunction; pt w/signs of significant RV overload on exam (JVD; BL LE edema). Repeat echo c/w previous  -IV lasix 40mg in AM; will start standing IV diuretics as recommended by cardiology c/s  -cardiology c/s; f/u recs  -Pulm c/s; recs appreciated  -f/u infectious negative to date  -Leg minimally c/f cellulitis; monitor off antibiotics for now  -OP labs reveal recent Hb of 11s; now in 9s and pt endorses episodes of melena  -H&H stable over admission  -Hematuria in mckoy bag; possibly traumatic insertion  -c/w eliquis for now; monitor for bleeding (clinical or laboratory)

## 2021-03-20 NOTE — CONSULT NOTE ADULT - SUBJECTIVE AND OBJECTIVE BOX
HPI:  89F PMH AF (on eliquis), HTN, HLD p/w one week of lethargy, shortness of breath and confusion. Was at PMD and found to be hypoxic and referred to the ED. Pt. currently feels slightly better. No chest pain, nausea, vomiting, diaphoresis, weakness, numbness, paresthesias. At rest she is comfortable but endorses some shortness of breath when exerting herself even in bed. Cardiology consulted for assistance with management      PMHx:   H/O cardiac arrhythmia    Atrial fibrillation    MVP (Mitral Valve Prolapse)    Osteoarthritis    Hypertension    Hyperlipidemia        PSHx:   History of right hip replacement    H/O dilation and curettage    Polyp of Ureter    S/P Tonsillectomy        Allergies:  No Known Allergies      Home Meds:    Current Medications:   ALBUTerol    0.083% 2.5 milliGRAM(s) Nebulizer every 6 hours PRN  fluticasone propionate 50 MICROgram(s)/spray Nasal Spray 1 Spray(s) Both Nostrils two times a day  pantoprazole  Injectable 40 milliGRAM(s) IV Push every 12 hours      FAMILY HISTORY:  FH: stroke        Social History:  Smoking History:  Alcohol Use:  Drug Use:    REVIEW OF SYSTEMS:  Constitutional:     [x ] negative [ ] fevers [ ] chills [ ] weight loss [ ] weight gain  HEENT:                  [x ] negative [ ] dry eyes [ ] eye irritation [ ] postnasal drip [ ] nasal congestion  CV:                         [ x] negative  [ ] chest pain [ ] orthopnea [ ] palpitations [ ] murmur  Resp:                     [x ] negative [ ] cough [ ] shortness of breath [ ] dyspnea [ ] wheezing [ ] sputum [ ]hemoptysis  GI:                          [ x] negative [ ] nausea [ ] vomiting [ ] diarrhea [ ] constipation [ ] abd pain [ ] dysphagia   :                        [ x] negative [ ] dysuria [ ] nocturia [ ] hematuria [ ] increased urinary frequency  Musculoskeletal: [x ] negative [ ] back pain [ ] myalgias [ ] arthralgias [ ] fracture  Skin:                       [ x] negative [ ] rash [ ] itch  Neurological:        [ x] negative [ ] headache [ ] dizziness [ ] syncope [ ] weakness [ ] numbness  Psychiatric:           [ x] negative [ ] anxiety [ ] depression  Endocrine:            [ x] negative [ ] diabetes [ ] thyroid problem  Heme/Lymph:      [ x] negative [ ] anemia [ ] bleeding problem  Allergic/Immune: [ x] negative [ ] itchy eyes [ ] nasal discharge [ ] hives [ ] angioedema    [ x] All other systems negative  [ ] Unable to assess ROS due to      Physical Exam:  T(F): 97.4 (03-20), Max: 98.1 (03-20)  HR: 59 (03-20) (54 - 65)  BP: 103/54 (03-20) (103/54 - 123/68)  RR: 18 (03-20)  SpO2: 94% (03-20)      GENERAL: No acute distress, well-developed  HEAD:  Atraumatic, Normocephalic  ENT: EOMI, PERRLA, conjunctiva and sclera clear, Neck supple, No JVD, moist mucosa  CHEST/LUNG: diffuse crackles anteriorly,   BACK: No spinal tenderness  HEART: irregularly irregular No murmurs, rubs, or gallops, elevated JVP  ABDOMEN: Soft, Nontender, Nondistended; Bowel sounds present  EXTREMITIES:  No clubbing, cyanosis, or edema  PSYCH: Nl behavior, nl affect  NEUROLOGY: AAOx3, non-focal, cranial nerves intact  SKIN: Normal color, No rashes or lesions  LINES:    Cardiovascular Diagnostic Testing:    ECG: Personally reviewed:    Echo: Personally reviewed:  < from: Transthoracic Echocardiogram (03.19.21 @ 15:07) >  1. Increased relative wall thickness with normal left  ventricularmass index, consistent with concentric left  ventricular remodeling.  2. Normal left ventricular systolic function. No segmental  wall motion abnormalities.  3. Severe right atrial enlargement.  4. Severe Right ventricular enlargement 5.8cm  with reduced   right ventricular systolic function.  5. Estimated right ventricular systolic pressure equals 55  mm Hg, assuming right atrial pressure equals 15 mm Hg,  consistent with moderate pulmonary hypertension.  6. Normal tricuspid valve. Severe tricuspid regurgitation.  7. Small pericardial effusion. No echocardiographic  evidence of pericardial tamponade.    < end of copied text >    Stress Testing:    Cath:    Imaging:    CXR: Personally reviewed  CT chest:   < from: CT Chest No Cont (04.19.19 @ 17:06) >    IMPRESSION: No pneumonia. mild bronchiectasis and mosaic attenuation    < end of copied text >      Labs: Personally reviewed                        9.4    6.56  )-----------( 143      ( 20 Mar 2021 06:54 )             32.0     03-20    138  |  103  |  53<H>  ----------------------------<  97  4.5   |  27  |  1.58<H>    Ca    9.4      20 Mar 2021 06:55  Phos  3.0     03-20  Mg     2.5     03-20    TPro  7.8  /  Alb  3.5  /  TBili  1.2  /  DBili  x   /  AST  50<H>  /  ALT  20  /  AlkPhos  190<H>  03-18      Serum Pro-Brain Natriuretic Peptide: 1829 pg/mL (03-18 @ 17:37)        Thyroid Stimulating Hormone, Serum: 5.61 uIU/mL (03-19 @ 01:39)

## 2021-03-20 NOTE — PROGRESS NOTE ADULT - PROBLEM SELECTOR PLAN 2
follows with Dr. Allen outpt. Recently dc'ed losartan and torsemide. Currently with peripheral fluid overload on exam, lower suspicion for pulm overload. Clinical picture c/w pulmonary HTN; possibly 2/2 primary lung process such as ILD  - Outpatient TTE w/moderate pulmonary HTN; RV systolic dysfunction  - hold further diuresis for now  - Significant JVD on exam follows with Dr. Allen outpt. Recently dc'ed losartan and torsemide. Currently with peripheral fluid overload on exam, lower suspicion for pulm overload. Clinical picture c/w pulmonary HTN; possibly 2/2 primary lung process such as ILD  - Outpatient TTE w/moderate pulmonary HTN; RV systolic dysfunction  -Repeat echo similar  -IV lasix 40mg AM 3/20  -Further diuresis pending cards c/s  - Significant JVD on exam

## 2021-03-20 NOTE — PROGRESS NOTE ADULT - PROBLEM SELECTOR PLAN 4
Left leg; initial c/f cellulitis and pt is s/p cefazolin x2d  -low suspicion for cellulitis given absence of warmth/pain  -area is very poorly demarcated and is not indurated  -venous stasis ulcer is covered in clean based eschar w/o purulence  -ctm off antibiotics

## 2021-03-20 NOTE — CHART NOTE - NSCHARTNOTEFT_GEN_A_CORE
Event Note    Notified by RN that patient desaturating to 88% on RA.     Vital Signs Last 24 Hrs  T(C): 36.3 (20 Mar 2021 04:17), Max: 36.7 (20 Mar 2021 00:24)  T(F): 97.4 (20 Mar 2021 04:17), Max: 98.1 (20 Mar 2021 00:24)  HR: 59 (20 Mar 2021 05:36) (50 - 65)  BP: 103/54 (20 Mar 2021 04:17) (103/54 - 123/68)  BP(mean): --  ABP: --  ABP(mean): --  RR: 18 (20 Mar 2021 05:36) (18 - 18)  SpO2: 94% (20 Mar 2021 05:36) (87% - 100%)      PHYSICAL EXAM:  GENERAL: NAD, Resting in bed  HEENT:  Head atraumatic,  PERRLA,   NECK: Supple  CHEST/LUNG: Crackles at bilateral bases. Unlabored respirations on room air  HEART: Regular rate and rhythm; No murmurs, rubs, or gallops  ABDOMEN: Bowel sounds present; Soft, Nontender, Nondistended.  EXTREMITIES:  2+ Peripheral Pulses, brisk capillary refill. 2+ edema of lower extremities  NERVOUS SYSTEM:  Alert & Oriented X3, non-focal and spontaneous movements of all extremities      Assessment and Plan:    Patient placed on 2LNC with improvement in saturations to 96-98%. At bedside, patient conversational without acute distress. Likely etiology volume overload in s/o HF.  Lower on the differential is PE as not tachycardic, although patient apixaban was recently discontinued. STAT chest Xray ordered.     -Alpa Rebollar, PGY-1

## 2021-03-20 NOTE — CONSULT NOTE ADULT - ATTENDING COMMENTS
Ms. Eric was evaluated at the bedside.  Her case was discussed with Dr. Oropeza.  At the time of my evaluation she reported no significant chest pain or dyspnea.  Her presentation is likely due to volume overload associated with the following conditions:  AF, pHTN, right heart dilation and failure as well as severe TR.  Continue O>I with close monitoring of renal function and electrolytes.
Patient seen and examined, data and imaging personally reviewed by me, history as noted above.  Agree with plan as outlined above.  Please reconsult as needed.

## 2021-03-20 NOTE — PROGRESS NOTE ADULT - PROBLEM SELECTOR PLAN 1
Possible ILD vs. acute on chronic HF vs infection (less likely given no focal consolidation on xray, procal unimpressive, change in cough) vs PE (less likely given on home eliquis)  -f/u high res CT chest  -Will obtain repeat TTE (last one 2019 showed severe TR, decreased RV systolic function, moderate MR. LV systolic fx wnl)  -wean O2 as tolerated  -DVT study is negative Possible ILD vs. acute on chronic HF vs infection (less likely given no focal consolidation on xray, procal unimpressive, change in cough) vs PE (less likely given on home eliquis)  -f/u high res CT chest  -Repeat TTE fairly consistent w/prior (last one 2019 showed severe TR, decreased RV systolic function, moderate MR. LV systolic fx wnl)  -wean O2 as tolerated  -DVT study is negative

## 2021-03-20 NOTE — PROGRESS NOTE ADULT - PROBLEM SELECTOR PLAN 3
on home coreg and eliquis  -holding coreg in the setting of bradycardia  -HF consult AM  -c/w eliquis on home coreg and eliquis  -holding coreg in the setting of bradycardia  -Cadiology c/s; recs pending  -c/w eliquis

## 2021-03-20 NOTE — CONSULT NOTE ADULT - ASSESSMENT
89 y/o F with Afib on eliquis, HTN, HLD who presents with Shortness of breath. TTE found to have dilated LA, with preserved EF and decreased RV function with elevated RV pressures. CT chest with bronchiectasis and no signs of fibrosis.     Shrortness of breath  Likely elevated left sided filling pressures  Would recommend IV diuresis with lasix 20mg IV daily     89 y/o F with Afib on eliquis, HTN, HLD who presents with Shortness of breath. TTE found to have dilated LA, with preserved EF and decreased RV function with elevated RV pressures. CT chest with bronchiectasis and no signs of fibrosis.     Shrortness of breath  Likely elevated left sided filling pressures leading to pulm HTN   Mild to moderate MR  Would recommend IV diuresis with lasix 40mg IV BID   Monitor i/o, monitor daily standing weights  Afterload reduction PRN  monitor on tele    Dayo Oropeza  Cardiology Fellow  [phone]    All Cardiology service information can be found 24/7 on [email or web address], password: [email or web address]

## 2021-03-20 NOTE — PROGRESS NOTE ADULT - SUBJECTIVE AND OBJECTIVE BOX
PROGRESS NOTE:     CONTACT INFO:  Jama Pitt MD (Resident Physician - PGY-1 - Internal Medicine)  [email or web address]  Pager: [phone] (any site) or [phone] ([facility] only)    Patient is a 89y old  Female who presents with a chief complaint of SOB, AMS (20 Mar 2021 07:14)      SUBJECTIVE / OVERNIGHT EVENTS: NF notified of hypoxia around 530am and returned pt to O2 w/improvement of saturations. Previously on RA. NF voiced concerns about possible increase in hypervolemia, but deferred IV diuretic pushes to primary team as pt sats improved on low flow O2 via NC and no respiratory distress was seen. Patient seen and evaluated at bedside. No complaints and says she is doing well. Says her left leg continues to be painful, but only when manually palpated during exam. No fever/chills. Denies SOB at rest, chest pain, palpitations, abdominal pain, nausea/vomiting. Case discussed w/pt; provider indicating he would consult cardiology given TTE yesterday w/large IVC and findings c/w RV overload w/possible acute disfunction. IV lasix 40mg added this morning due to exam    ADDITIONAL REVIEW OF SYSTEMS:    MEDICATIONS  (STANDING):  fluticasone propionate 50 MICROgram(s)/spray Nasal Spray 1 Spray(s) Both Nostrils two times a day  pantoprazole  Injectable 40 milliGRAM(s) IV Push every 12 hours    MEDICATIONS  (PRN):  ALBUTerol    0.083% 2.5 milliGRAM(s) Nebulizer every 6 hours PRN Shortness of Breath and/or Wheezing      CAPILLARY BLOOD GLUCOSE        I&O's Summary    19 Mar 2021 07:  -  20 Mar 2021 07:00  --------------------------------------------------------  IN: 1080 mL / OUT: 2200 mL / NET: -1120 mL    20 Mar 2021 07:01  -  20 Mar 2021 10:33  --------------------------------------------------------  IN: 480 mL / OUT: 0 mL / NET: 480 mL        PHYSICAL EXAM:  Vital Signs Last 24 Hrs  T(C): 36.3 (20 Mar 2021 04:17), Max: 36.7 (20 Mar 2021 00:24)  T(F): 97.4 (20 Mar 2021 04:17), Max: 98.1 (20 Mar 2021 00:24)  HR: 59 (20 Mar 2021 05:36) (54 - 65)  BP: 103/54 (20 Mar 2021 04:17) (103/54 - 123/68)  BP(mean): --  RR: 18 (20 Mar 2021 05:36) (18 - 18)  SpO2: 94% (20 Mar 2021 05:36) (87% - 100%)    GENERAL: Severe cachexia. NAD; alert, resting comfortably. Wearing nasal cannula; sats in mid 90s, but drop slightly with longer sentences  HEAD:  Atraumatic, Normocephalic  EYES: EOMI, PERRLA, conjunctiva and sclera clear  NECK: Supple, +significant JVD c/w prior exam  CHEST/LUNG: Diffuse crackles in all auscultated fields that are more pronounced than yesterday. No wheeze, ronchi or rales  HEART: Irregular rate and irregular rhythm; +3/6 holosystolic murmur audible throughout; no rubs, or gallops  ABDOMEN: Soft, Nontender, Nondistended; Bowel sounds present  EXTREMITIES:  2+ Peripheral Pulses, 2+ pitting edema up to mid-thigh b/l  PSYCH: AAOx3, normal behavior, normal affect  NEUROLOGY: non-focal, normal strength, normal sensation  SKIN: LLE shallow skin wound with mild, poorly demarcated surrounding erythema. Area is not warm to touch vs. contralateral leg. No active discharge. BL LE are both modestly TTP    LABS:                        9.4    6.56  )-----------( 143      ( 20 Mar 2021 06:54 )             32.0     03-20    138  |  103  |  53<H>  ----------------------------<  97  4.5   |  27  |  1.58<H>    Ca    9.4      20 Mar 2021 06:55  Phos  3.0     03-20  Mg     2.5     -20    TPro  7.8  /  Alb  3.5  /  TBili  1.2  /  DBili  x   /  AST  50<H>  /  ALT  20  /  AlkPhos  190<H>  03-18          Urinalysis Basic - ( 18 Mar 2021 19:19 )    Color: Yellow / Appearance: Clear / S.016 / pH: x  Gluc: x / Ketone: Negative  / Bili: Negative / Urobili: 2 mg/dL   Blood: x / Protein: 30 mg/dL / Nitrite: Negative   Leuk Esterase: Negative / RBC: 7 /hpf / WBC 1 /HPF   Sq Epi: x / Non Sq Epi: 0 /hpf / Bacteria: Negative        Culture - Urine (collected 18 Mar 2021 23:13)  Source: .Urine Catheterized  Final Report (19 Mar 2021 21:37):    No growth        RADIOLOGY & ADDITIONAL TESTS:  Results Reviewed:   Imaging Personally Reviewed:  Electrocardiogram Personally Reviewed:    COORDINATION OF CARE:  Care Discussed with Consultants/Other Providers [Y/N]:  Prior or Outpatient Records Reviewed [Y/N]:

## 2021-03-20 NOTE — PROGRESS NOTE ADULT - ATTENDING COMMENTS
#hypoxic respiratory failure- with sob progressive over the last few weeks, CXR with suspicion for ILD. Has been on AC and dopplers negative so low suspicion for PE.   Echo: dilated LA, with preserved EF and decreased RV function with elevated RV pressures.    Continue diureses with IV lasix 40mg BID  Monitor VS, Is/Os, BMP

## 2021-03-21 NOTE — PROGRESS NOTE ADULT - PROBLEM SELECTOR PLAN 5
on home coreg  -holding at this time for bradycardia on home coreg and eliquis  [id number]  -holding coreg in the setting of bradycardia  - eliquis held due to concern for symptomatic anemia and hematuria; appreciate cardiology recs regarding cessation or resumption.   -Cadiology c/s; recs pending  -c/w eliquis

## 2021-03-21 NOTE — PROGRESS NOTE ADULT - PROBLEM SELECTOR PLAN 3
on home coreg and eliquis  -holding coreg in the setting of bradycardia  -Cadiology c/s; recs pending  -c/w eliquis Improving with diuresis suggestive of congestive nephropathy  - c/w IV lasix 40mg BID  - trend Cr  - avoid nephrotoxic drugs, renally dose meds

## 2021-03-21 NOTE — PROGRESS NOTE ADULT - SUBJECTIVE AND OBJECTIVE BOX
Daily In-House Cardiology Progress Note  -------------------------------------------------------    24-Hour Events/Subjective:      -SOB improved, legs still feeling swollen.    Telemetry:  -Not on telemetry.    Current Meds:  ALBUTerol    0.083% 2.5 milliGRAM(s) Nebulizer every 6 hours PRN  albuterol/ipratropium for Nebulization 3 milliLiter(s) Nebulizer two times a day  fluticasone propionate 50 MICROgram(s)/spray Nasal Spray 1 Spray(s) Both Nostrils two times a day  furosemide   Injectable 40 milliGRAM(s) IV Push two times a day  pantoprazole  Injectable 40 milliGRAM(s) IV Push every 12 hours    Vitals:  T(F): 97.9 (03-20), Max: 97.9 (03-20)  HR: 66 (03-21) (60 - 78)  BP: 126/64 (03-21) (109/52 - 126/64)  RR: 18 (03-21)  SpO2: 100% (03-21)    I&O's Summary  20 Mar 2021 07:01  -  21 Mar 2021 07:00  --------------------------------------------------------  IN: 840 mL / OUT: 1300 mL / NET: -460 mL    21 Mar 2021 07:01  -  21 Mar 2021 10:47  --------------------------------------------------------  IN: 0 mL / OUT: 1500 mL / NET: -1500 mL    Physical Exam:  Appearance: No acute distress; elderly and frail appearing  Eyes: PERRL, EOMI, pink conjunctiva  HEENT: Normal oral mucosa  Cardiovascular: RRR, S1, S2, no murmurs, rubs, or gallops; no edema; no JVD  Respiratory: Rales b/l in all lung fields  Gastrointestinal: soft, non-tender, non-distended with normal bowel sounds  Musculoskeletal: No clubbing; no joint deformity   Neurologic: Non-focal  Lymphatic: No lymphadenopathy  Psychiatry: AAOx3, mood & affect appropriate  Skin: No rashes, ecchymoses, or cyanosis                        9.3    6.98  )-----------( 149      ( 21 Mar 2021 07:22 )             32.0     03-21    136  |  98  |  45<H>  ----------------------------<  94  4.6   |  28  |  1.49<H>    Ca    9.0      21 Mar 2021 07:22  Phos  2.5     03-21  Mg     2.3     03-21    CARDIAC MARKERS ( 19 Mar 2021 07:22 )  86 ng/L / x     / x     / x     / x     / x      CARDIAC MARKERS ( 18 Mar 2021 17:37 )  90 ng/L / x     / x     / x     / x     / x          Serum Pro-Brain Natriuretic Peptide: 1829 pg/mL (03-18 @ 17:37)

## 2021-03-21 NOTE — PROGRESS NOTE ADULT - PROBLEM SELECTOR PLAN 4
Left leg; initial c/f cellulitis and pt is s/p cefazolin x2d  -low suspicion for cellulitis given absence of warmth/pain  -area is very poorly demarcated and is not indurated  -venous stasis ulcer is covered in clean based eschar w/o purulence  -ctm off antibiotics Pt Reported progressive weakness 10-14 prior to admission  Also reporting intermittent melena. Baseline hgb 11-13 up until 06/20 per outpatient notes. Presents with hgb 9.4. Pt also has persistent gross hematuria initially thought to be due to traumatic mckoy placement  Eliquis held on admission given concern for symptomatic anemia. Hgb stable throughout hospital course despite hematuria.  No melena.   Per [facility] family does not want invasive procedures including endoscopy  c/w IV protonix.   WIll need cardiology input on whether pt should resume Eliquis once hematuria resolves.

## 2021-03-21 NOTE — PROGRESS NOTE ADULT - ATTENDING COMMENTS
Ms. Eric was evaluated at the bedside.  Her case was discussed with Dr. Brizuela.  The patient reports improvement in her dyspnea however remains volume overloaded.  Continue diuresis with close monitoring of renal function and electrolytes.

## 2021-03-21 NOTE — PROGRESS NOTE ADULT - PROBLEM SELECTOR PLAN 1
Possible ILD vs. acute on chronic HF vs infection (less likely given no focal consolidation on xray, procal unimpressive, change in cough) vs PE (less likely given on home eliquis)  -f/u high res CT chest  -Repeat TTE fairly consistent w/prior (last one 2019 showed severe TR, decreased RV systolic function, moderate MR. LV systolic fx wnl)  -wean O2 as tolerated  -DVT study is negative Possible ILD vs. acute on chronic HF vs infection (less likely given no focal consolidation on xray, procal unimpressive, change in cough) vs PE (less likely given on home eliquis)  CT chest w/ chronic bronchiectasis w/overlaying mosacism. + small b/l pleural effusions  -Repeat TTE fairly consistent w/prior (last one 2019 showed severe TR, decreased RV systolic function, moderate MR. LV systolic fx wnl)  -wean O2 as tolerated, now on RA   -DVT study is negative; PE less likely  - duonebs prn per pulm

## 2021-03-21 NOTE — PROGRESS NOTE ADULT - ASSESSMENT
89F PMH AF (on eliquis), HTN, HLD p/w daughter in law c/o one week of increasing delirium / confusion, worsening sob, decreasing activity x 1 week and found to have acute hypoxic respiratory failure and is admitted for further w/u    -Current clinical presentation c/f primary lung process driving pulmonary HTN vs. RV failure  -CT chest ordered; prior from 2019 w/o ILD  -Outpatient echo w/RV systolic dysfunction; pt w/signs of significant RV overload on exam (JVD; BL LE edema). Repeat echo c/w previous  -IV lasix 40mg in AM; will start standing IV diuretics as recommended by cardiology c/s  -cardiology c/s; f/u recs  -Pulm c/s; recs appreciated  -f/u infectious negative to date  -Leg minimally c/f cellulitis; monitor off antibiotics for now  -OP labs reveal recent Hb of 11s; now in 9s and pt endorses episodes of melena  -H&H stable over admission  -Hematuria in mckoy bag; possibly traumatic insertion  -c/w eliquis for now; monitor for bleeding (clinical or laboratory)   89F PMH AF (on eliquis), HTN, HLD p/w daughter in law c/o one week of increasing delirium / confusion, worsening sob, decreasing activity x 1 week and found to have acute hypoxic respiratory failure; ddx  primary lung process driving pulmonary HTN vs. RV failure.

## 2021-03-21 NOTE — PROGRESS NOTE ADULT - SUBJECTIVE AND OBJECTIVE BOX
PROGRESS NOTE:   Authoted by Dr. Wesley Henry MD, JENNIFER  Pager [phone] [facility], [place]     Patient is a 89y old  Female who presents with a chief complaint of SOB, AMS (20 Mar 2021 10:42)    SUBJECTIVE / OVERNIGHT EVENTS:  No acute events overnight       MEDICATIONS  (STANDING):  albuterol/ipratropium for Nebulization 3 milliLiter(s) Nebulizer two times a day  fluticasone propionate 50 MICROgram(s)/spray Nasal Spray 1 Spray(s) Both Nostrils two times a day  furosemide   Injectable 40 milliGRAM(s) IV Push two times a day  pantoprazole  Injectable 40 milliGRAM(s) IV Push every 12 hours    MEDICATIONS  (PRN):  ALBUTerol    0.083% 2.5 milliGRAM(s) Nebulizer every 6 hours PRN Shortness of Breath and/or Wheezing    OBJECTIVE:  Vital Signs Last 24 Hrs  T(C): 36.6 (20 Mar 2021 20:57), Max: 36.6 (20 Mar 2021 20:57)  T(F): 97.9 (20 Mar 2021 20:57), Max: 97.9 (20 Mar 2021 20:57)  HR: 66 (21 Mar 2021 06:55) (60 - 78)  BP: 126/64 (21 Mar 2021 04:32) (109/52 - 126/64)  RR: 18 (21 Mar 2021 04:32) (18 - 18)  SpO2: 100% (21 Mar 2021 06:55) (95% - 100%)    I&O's Summary    20 Mar 2021 07:01  -  21 Mar 2021 07:00  --------------------------------------------------------  IN: 840 mL / OUT: 1300 mL / NET: -460 mL    CONSTITUTIONAL: NAD, well-developed  HEAD:  Atraumatic, Normocephalic  EYES: EOMI, PERRLA, conjunctiva and sclera clear  ENMT: No tonsillar erythema, exudates, or enlargement; Moist mucous membranes, Good dentition  NECK: Supple, No JVD  NERVOUS SYSTEM: AOX3, motor and sensation grossly intact in b/l UE and b/l LE  PSYCHIATRIC: Appropriate affect and mood  CHEST/LUNG: Clear to auscultation bilaterally; No rales, rhonchi, wheezing, or rubs  HEART: Regular rate and rhythm; No murmurs, rubs, or gallops. No LE edema  ABDOMEN: Soft, Nontender, Nondistended; Bowel sounds present  EXTREMITIES:  2+ Peripheral Pulses, No clubbing, cyanosis  SKIN: No rashes or lesions    LABS:                        9.3    6.98  )-----------( 149      ( 21 Mar 2021 07:22 )             32.0     03-20    138  |  103  |  53<H>  ----------------------------<  97  4.5   |  27  |  1.58<H>    Ca    9.4      20 Mar 2021 06:55  Phos  3.0     03-20  Mg     2.5     03-20    Culture - Urine (collected 18 Mar 2021 23:13)  Source: .Urine Catheterized  Final Report (19 Mar 2021 21:37):    No growth      RADIOLOGY & ADDITIONAL TESTS:  Results Reviewed:   Imaging Personally Reviewed:  Electrocardiogram Personally Reviewed:    COORDINATION OF CARE:  Care Discussed with Consultants/Other Providers [Y/N]:  Prior or Outpatient Records Reviewed [Y/N]:   PROGRESS NOTE:   Authoted by Dr. Wesley Henry MD, JENNIFER  Pager [phone] [facility], [place]     Patient is a 89y old  Female who presents with a chief complaint of SOB, AMS (20 Mar 2021 10:42)    SUBJECTIVE / OVERNIGHT EVENTS:  No acute events overnight   Patient reports improved mood and energy. Markedly improved appetite. Feels better overall  Still experiencing Gross hematuria, reportedly clots in mckoy overnight (discarded before I could view)  Lower extremity edema has grossly unchanged.  Denies subjective shortness of breath, no chest pain, no palpitations.    MEDICATIONS  (STANDING):  albuterol/ipratropium for Nebulization 3 milliLiter(s) Nebulizer two times a day  fluticasone propionate 50 MICROgram(s)/spray Nasal Spray 1 Spray(s) Both Nostrils two times a day  furosemide   Injectable 40 milliGRAM(s) IV Push two times a day  pantoprazole  Injectable 40 milliGRAM(s) IV Push every 12 hours    MEDICATIONS  (PRN):  ALBUTerol    0.083% 2.5 milliGRAM(s) Nebulizer every 6 hours PRN Shortness of Breath and/or Wheezing    OBJECTIVE:  Vital Signs Last 24 Hrs  T(C): 36.6 (20 Mar 2021 20:57), Max: 36.6 (20 Mar 2021 20:57)  T(F): 97.9 (20 Mar 2021 20:57), Max: 97.9 (20 Mar 2021 20:57)  HR: 66 (21 Mar 2021 06:55) (60 - 78)  BP: 126/64 (21 Mar 2021 04:32) (109/52 - 126/64)  RR: 18 (21 Mar 2021 04:32) (18 - 18)  SpO2: 100% (21 Mar 2021 06:55) (95% - 100%)    I&O's Summary    20 Mar 2021 07:01  -  21 Mar 2021 07:00  --------------------------------------------------------  IN: 840 mL / OUT: 1300 mL / NET: -460 mL    CONSTITUTIONAL: NAD  HEAD:  Atraumatic, Normocephalic  EYES: EOMI, conjunctiva and sclera clear  NECK: Supple, +JVD  NERVOUS SYSTEM: AOX3, motor and sensation grossly intact in b/l UE and b/l LE  PSYCHIATRIC: Appropriate affect and mood  CHEST/LUNG: +Bibasilar crackles  HEART: Regular rate and rhythm, +S1/S2; grade II diastolic murmur best appreciated RUSB. +2 LE edema  ABDOMEN: Soft, Nontender, Nondistended; Bowel sounds present  EXTREMITIES:  palpable peripheral Pulses, No clubbing, cyanosis  SKIN: No rashes or lesions    LABS:                        9.3    6.98  )-----------( 149      ( 21 Mar 2021 07:22 )             32.0     03-20    138  |  103  |  53<H>  ----------------------------<  97  4.5   |  27  |  1.58<H>    Ca    9.4      20 Mar 2021 06:55  Phos  3.0     03-20  Mg     2.5     03-20    Culture - Urine (collected 18 Mar 2021 23:13)  Source: .Urine Catheterized  Final Report (19 Mar 2021 21:37):    No growth    RADIOLOGY & ADDITIONAL TESTS:  r< from: CT Chest No Cont (03.20.21 @ 17:48) >  FINDINGS:    LUNGS AND AIRWAYS: Patent central airways.  Bilateral apical opacities are unchanged. In addition, there are bilateral juxtapleural linear opacities that are unchanged allowing for differences in technique. The bilateral bronchiectasis is unchanged.    The mosaic attenuation of the lungs is unchanged allowing for respiratory motion artifact. Calcified granuloma in the left upper lobe.    PLEURA: Small bilateral pleural effusions.    MEDIASTINUM AND VALERIE: No lymphadenopathy. Hiatal hernia.    VESSELS: Left-sided aortic arch and left-sided descending thoracic aorta. The aorta is tortuous. Aortic calcification. 1 cm calcified splenic artery aneurysm, as seen on prior.    HEART: The heart is enlarged. In particular, the right atrium, right ventricle and left atrium are enlarged. Coronary artery calcifications. Small amount of pericardial fluid.    CHEST WALL AND LOWER NECK: Within normal limits.    VISUALIZED UPPER ABDOMEN: Qualitatively, the hepatic veins are dilated.    BONES: Degenerative changes of the spine.    IMPRESSION:  1.  The heart is enlarged. In particular, the atria and right ventricle are enlarged.  2.  No change in the biapical opacities and bronchiectasis allowing for differences in technique.    < end of copied text >    COORDINATION OF CARE:  Care Discussed with Consultants/Other Providers [Y/N]: Cardiology   Prior or Outpatient Records Reviewed [Y/N]:

## 2021-03-21 NOTE — PROGRESS NOTE ADULT - ATTENDING COMMENTS
#hypoxic respiratory failure- with sob progressive over the last few weeks, CXR with suspicion for ILD. Has been on AC and dopplers negative so low suspicion for PE.   Echo: dilated LA, with preserved EF and decreased RV function with elevated RV pressures.    Continue diureses with IV lasix 40mg BID  Monitor VS, Is/Os, BMP .   Appreciate cardiology recs

## 2021-03-21 NOTE — PROGRESS NOTE ADULT - PROBLEM SELECTOR PLAN 2
follows with Dr. Allen outpt. Recently dc'ed losartan and torsemide. Currently with peripheral fluid overload on exam, lower suspicion for pulm overload. Clinical picture c/w pulmonary HTN; possibly 2/2 primary lung process such as ILD  - Outpatient TTE w/moderate pulmonary HTN; RV systolic dysfunction  -Repeat echo similar  -IV lasix 40mg AM 3/20  -Further diuresis pending cards c/s  - Significant JVD on exam follows with Dr. Allen outpt. Recently dc'ed losartan and torsemide in setting of worsening creatinine; 10 days prior to admission. Currently with peripheral fluid overload on exam, lower suspicion for pulm overload. Clinical picture c/w pulmonary HTN; possibly 2/2 primary lung process such as ILD  - Outpatient TTE w/moderate pulmonary HTN; RV systolic dysfunction  -Repeat echo similar  -IV lasix 40mg BID, urine output adequate  -Further diuresis pending cards c/s

## 2021-03-21 NOTE — PROGRESS NOTE ADULT - PROBLEM SELECTOR PLAN 7
Transitions of Care Status:  1.  Name of PCP:  2.  PCP Contacted on Admission: [ ] Y    [ ] N    3.  PCP contacted at Discharge: [ ] Y    [ ] N    [ ] N/A  4.  Post-Discharge Appointment Date and Location:  5.  Summary of Handoff given to PCP: on home coreg  -holding at this time for bradycardia; can resume as tolerated

## 2021-03-21 NOTE — PROGRESS NOTE ADULT - PROBLEM SELECTOR PLAN 6
DVT: eliquis  Diet: DASH diet  PT eval Left leg; initial c/f cellulitis and pt is s/p cefazolin x2d; abx d/c  -low suspicion for cellulitis   -venous stasis ulcer is covered in clean based eschar w/o purulence  -ctm off antibiotics

## 2021-03-21 NOTE — PROGRESS NOTE ADULT - ASSESSMENT
89 y/o F with Afib on eliquis, HTN, HLD who presents with Shortness of breath. TTE found to have dilated LA, with preserved EF and decreased RV function with elevated RV pressures. CT chest with bronchiectasis and no signs of fibrosis.     #SOB  DDx: Likely elevated left sided filling pressures leading to pulm HTN and related to her Mild to moderate MR.  -Would recommend IV diuresis with Lasix 40mg IV BID; can continue.  -Monitor i/o, monitor daily standing weights  -Afterload reduction PRN  -Telemetry monitoring    Case to be discussed with Dr. Gallagher.    Amy Brizuela MD PGY-5  Cardiology Fellow  All Cardiology service information can be found 24/7 on [email or web address], password: [email or web address]  Note is preliminary until signed by the attending.     89 y/o F with Afib on eliquis, HTN, HLD who presents with Shortness of breath. TTE found to have dilated LA, with preserved EF and decreased RV function with elevated RV pressures. CT chest with bronchiectasis and no signs of fibrosis.     #SOB  DDx: Likely elevated left sided filling pressures leading to pulm HTN.  -Would recommend IV diuresis with Lasix 40mg IV BID; can continue.  -Monitor i/o, monitor daily standing weights  -Afterload reduction PRN  -Telemetry monitoring    Case to be discussed with Dr. Gallagher.    Amy Brizuela MD PGY-5  Cardiology Fellow  All Cardiology service information can be found 24/7 on [email or web address], password: [email or web address]  Note is preliminary until signed by the attending.

## 2021-03-22 NOTE — PROGRESS NOTE ADULT - SUBJECTIVE AND OBJECTIVE BOX
INTERVAL EVENTS: No o/n events. Denies CP, dyspnea, palpitations, presyncope, syncope, f/c/n/v.     REVIEW OF SYSTEMS:  Constitutional:     [X] negative [ ] fevers [ ] chills [ ] weight loss [ ] weight gain  HEENT:                  [X] negative [ ] dry eyes [ ] eye irritation [ ] postnasal drip [ ] nasal congestion  CV:                         [X] negative  [ ] chest pain [ ] orthopnea [ ] palpitations [ ] murmur  Resp:                     [X] negative [ ] cough [ ] shortness of breath [ ] dyspnea [ ] wheezing [ ] sputum [ ] hemoptysis  GI:                          [X] negative [ ] nausea [ ] vomiting [ ] diarrhea [ ] constipation [ ] abd pain [ ] dysphagia   :                        [X] negative [ ] dysuria [ ] nocturia [ ] hematuria [ ] increased urinary frequency  MSK:                      [X] negative [ ] back pain [ ] myalgias [ ] arthralgias [ ] fracture  Skin:                       [X] negative [ ] rash [ ] itch  Neuro:                   [X] negative [ ] headache [ ] dizziness [ ] syncope [ ] weakness [ ] numbness  Psych:                    [X] negative [ ] anxiety [ ] depression  Endo:                     [X] negative [ ] diabetes [ ] thyroid problem  Heme/Lymph:      [X] negative [ ] anemia [ ] bleeding problem  Allergic/Immune: [X] negative [ ] itchy eyes [ ] nasal discharge [ ] hives [ ] angioedema    [X] All other systems negative or otherwise described above.  [ ] Unable to assess ROS because ________.    PAST MEDICAL & SURGICAL HISTORY:  H/O cardiac arrhythmia    Atrial fibrillation    MVP (Mitral Valve Prolapse)    Osteoarthritis    Hypertension    Hyperlipidemia    History of right hip replacement    H/O dilation and curettage    Polyp of Ureter  removed many years ago    S/P Tonsillectomy  as child      MEDICATIONS  (STANDING):  albuterol/ipratropium for Nebulization 3 milliLiter(s) Nebulizer two times a day  fluticasone propionate 50 MICROgram(s)/spray Nasal Spray 1 Spray(s) Both Nostrils two times a day  furosemide   Injectable 40 milliGRAM(s) IV Push two times a day  pantoprazole  Injectable 40 milliGRAM(s) IV Push every 12 hours  polyethylene glycol 3350 17 Gram(s) Oral daily    MEDICATIONS  (PRN):  ALBUTerol    0.083% 2.5 milliGRAM(s) Nebulizer every 6 hours PRN Shortness of Breath and/or Wheezing    ICU Vital Signs Last 24 Hrs  T(C): 36.6 (22 Mar 2021 04:44), Max: 36.6 (22 Mar 2021 04:44)  T(F): 97.8 (22 Mar 2021 04:44), Max: 97.8 (22 Mar 2021 04:44)  HR: 67 (22 Mar 2021 05:45) (61 - 79)  BP: 114/66 (22 Mar 2021 04:44) (107/64 - 118/63)  BP(mean): --  ABP: --  ABP(mean): --  RR: 18 (22 Mar 2021 04:44) (18 - 18)  SpO2: 99% (22 Mar 2021 05:45) (95% - 100%)    Daily     Daily     PHYSICAL EXAM:  GEN: Awake, alert. NAD.   HEENT: NCAT, PERRL, EOMI. Mucosa moist. No JVD.  RESP: CTA b/l  CV: RRR. Normal S1/S2. No m/r/g.  ABD: Soft. NT/ND. BS+  EXT: Warm. No edema, clubbing, or cyanosis.   NEURO: AAOx3. No focal deficits.     LABS:                        8.8    6.18  )-----------( 140      ( 22 Mar 2021 06:36 )             28.8     03-22    138  |  101  |  41<H>  ----------------------------<  92  4.5   |  30  |  1.33<H>    Ca    8.6      22 Mar 2021 06:35  Phos  2.2     03-22  Mg     2.1     03-22          PT/INR - ( 22 Mar 2021 06:36 )   PT: 13.4 sec;   INR: 1.12 ratio             I&O's Summary    21 Mar 2021 07:01  -  22 Mar 2021 07:00  --------------------------------------------------------  IN: 350 mL / OUT: 3650 mL / NET: -3300 mL      BNP    RADIOLOGY & ADDITIONAL STUDIES:    TELEMETRY: reviewed    EKG: reviewed      < from: Transthoracic Echocardiogram (03.19.21 @ 15:07) >  Conclusions:  1. Increased relative wall thickness with normal left  ventricularmass index, consistent with concentric left  ventricular remodeling.  2. Normal left ventricular systolic function. No segmental  wall motion abnormalities.  3. Severe right atrial enlargement.  4. Severe Right ventricular enlargement 5.8cm  with reduced   right ventricular systolic function.  5. Estimated right ventricular systolic pressure equals 55  mm Hg, assuming right atrial pressure equals 15 mm Hg,  consistent with moderate pulmonary hypertension.  6. Normal tricuspid valve. Severe tricuspid regurgitation.  7. Small pericardial effusion. No echocardiographic  evidence of pericardial tamponade.  ------------------------------------------------------------------------  Confirmed on  3/19/2021 - 19:30:11 by JASON Kim  ------------------------------------------------------------------------    < end of copied text >      < from: Transthoracic Echocardiogram (04.22.19 @ 23:23) >  Conclusions:  1. Mitral annular calcification, otherwise normal mitral  valve. Moderate mitral regurgitation.  2. Sclerotic aortic valve leaflets with normal opening.  Mild-moderate aortic regurgitation.  3. Increased relative wall thickness with normal left  ventricular mass index, consistent with concentric left  ventricular remodeling.  4. Normal left ventricular systolic function. No segmental  wall motion abnormalities.  5. Severe right atrial enlargement.  6. Right ventricular enlargement with decreased right  ventricular systolic function.  7. Estimated right ventricular systolic pressure equals 47  mm Hg, assuming right atrial pressure equals 8 mm Hg,  consistent with mild pulmonary hypertension.  8. Normal tricuspid valve. Severe tricuspid regurgitation.  *** No previous Echo exam.  ------------------------------------------------------------------------  Confirmed on  4/22/2019 - 15:01:01 by Raysa White M.D.  -----------------------------------------------------------------------    < end of copied text >      < from: CT Chest No Cont (03.20.21 @ 17:48) >  IMPRESSION:  1.  The heart is enlarged. In particular, the atria and right ventricle are enlarged.  2.  No change in the biapical opacities and bronchiectasis allowing for differences in technique.    < end of copied text >     INTERVAL EVENTS: No o/n events. Denies CP, dyspnea, palpitations, presyncope, syncope, f/c/n/v.     REVIEW OF SYSTEMS:  Constitutional:     [X] negative [ ] fevers [ ] chills [ ] weight loss [ ] weight gain  HEENT:                  [X] negative [ ] dry eyes [ ] eye irritation [ ] postnasal drip [ ] nasal congestion  CV:                         [X] negative  [ ] chest pain [ ] orthopnea [ ] palpitations [ ] murmur  Resp:                     [X] negative [ ] cough [ ] shortness of breath [ ] dyspnea [ ] wheezing [ ] sputum [ ] hemoptysis  GI:                          [X] negative [ ] nausea [ ] vomiting [ ] diarrhea [ ] constipation [ ] abd pain [ ] dysphagia   :                        [X] negative [ ] dysuria [ ] nocturia [ ] hematuria [ ] increased urinary frequency  MSK:                      [X] negative [ ] back pain [ ] myalgias [ ] arthralgias [ ] fracture  Skin:                       [X] negative [ ] rash [ ] itch  Neuro:                   [X] negative [ ] headache [ ] dizziness [ ] syncope [ ] weakness [ ] numbness  Psych:                    [X] negative [ ] anxiety [ ] depression  Endo:                     [X] negative [ ] diabetes [ ] thyroid problem  Heme/Lymph:      [X] negative [ ] anemia [ ] bleeding problem  Allergic/Immune: [X] negative [ ] itchy eyes [ ] nasal discharge [ ] hives [ ] angioedema    [X] All other systems negative or otherwise described above.  [ ] Unable to assess ROS because ________.    PAST MEDICAL & SURGICAL HISTORY:  H/O cardiac arrhythmia    Atrial fibrillation    MVP (Mitral Valve Prolapse)    Osteoarthritis    Hypertension    Hyperlipidemia    History of right hip replacement    H/O dilation and curettage    Polyp of Ureter  removed many years ago    S/P Tonsillectomy  as child      MEDICATIONS  (STANDING):  albuterol/ipratropium for Nebulization 3 milliLiter(s) Nebulizer two times a day  fluticasone propionate 50 MICROgram(s)/spray Nasal Spray 1 Spray(s) Both Nostrils two times a day  furosemide   Injectable 40 milliGRAM(s) IV Push two times a day  pantoprazole  Injectable 40 milliGRAM(s) IV Push every 12 hours  polyethylene glycol 3350 17 Gram(s) Oral daily    MEDICATIONS  (PRN):  ALBUTerol    0.083% 2.5 milliGRAM(s) Nebulizer every 6 hours PRN Shortness of Breath and/or Wheezing    ICU Vital Signs Last 24 Hrs  T(C): 36.6 (22 Mar 2021 04:44), Max: 36.6 (22 Mar 2021 04:44)  T(F): 97.8 (22 Mar 2021 04:44), Max: 97.8 (22 Mar 2021 04:44)  HR: 67 (22 Mar 2021 05:45) (61 - 79)  BP: 114/66 (22 Mar 2021 04:44) (107/64 - 118/63)  BP(mean): --  ABP: --  ABP(mean): --  RR: 18 (22 Mar 2021 04:44) (18 - 18)  SpO2: 99% (22 Mar 2021 05:45) (95% - 100%)    Daily     Daily     PHYSICAL EXAM:  GEN: Awake, alert. NAD.   HEENT: NCAT, PERRL, EOMI. Mucosa moist.   RESP: CTA b/l  CV: RRR. Normal S1/S2. No r/g. Systolic murmur  ABD: Soft. NT/ND. BS+  EXT: Warm. No clubbing, or cyanosis. 2+ pitting edema to the shins  NEURO: AAOx3. No focal deficits.     LABS:                        8.8    6.18  )-----------( 140      ( 22 Mar 2021 06:36 )             28.8     03-22    138  |  101  |  41<H>  ----------------------------<  92  4.5   |  30  |  1.33<H>    Ca    8.6      22 Mar 2021 06:35  Phos  2.2     03-22  Mg     2.1     03-22          PT/INR - ( 22 Mar 2021 06:36 )   PT: 13.4 sec;   INR: 1.12 ratio             I&O's Summary    21 Mar 2021 07:01  -  22 Mar 2021 07:00  --------------------------------------------------------  IN: 350 mL / OUT: 3650 mL / NET: -3300 mL      BNP    RADIOLOGY & ADDITIONAL STUDIES:    TELEMETRY: reviewed    EKG: reviewed      < from: Transthoracic Echocardiogram (03.19.21 @ 15:07) >  Conclusions:  1. Increased relative wall thickness with normal left  ventricularmass index, consistent with concentric left  ventricular remodeling.  2. Normal left ventricular systolic function. No segmental  wall motion abnormalities.  3. Severe right atrial enlargement.  4. Severe Right ventricular enlargement 5.8cm  with reduced   right ventricular systolic function.  5. Estimated right ventricular systolic pressure equals 55  mm Hg, assuming right atrial pressure equals 15 mm Hg,  consistent with moderate pulmonary hypertension.  6. Normal tricuspid valve. Severe tricuspid regurgitation.  7. Small pericardial effusion. No echocardiographic  evidence of pericardial tamponade.  ------------------------------------------------------------------------  Confirmed on  3/19/2021 - 19:30:11 by JASON Kim  ------------------------------------------------------------------------    < end of copied text >      < from: Transthoracic Echocardiogram (04.22.19 @ 23:23) >  Conclusions:  1. Mitral annular calcification, otherwise normal mitral  valve. Moderate mitral regurgitation.  2. Sclerotic aortic valve leaflets with normal opening.  Mild-moderate aortic regurgitation.  3. Increased relative wall thickness with normal left  ventricular mass index, consistent with concentric left  ventricular remodeling.  4. Normal left ventricular systolic function. No segmental  wall motion abnormalities.  5. Severe right atrial enlargement.  6. Right ventricular enlargement with decreased right  ventricular systolic function.  7. Estimated right ventricular systolic pressure equals 47  mm Hg, assuming right atrial pressure equals 8 mm Hg,  consistent with mild pulmonary hypertension.  8. Normal tricuspid valve. Severe tricuspid regurgitation.  *** No previous Echo exam.  ------------------------------------------------------------------------  Confirmed on  4/22/2019 - 15:01:01 by Raysa hWite M.D.  -----------------------------------------------------------------------    < end of copied text >      < from: CT Chest No Cont (03.20.21 @ 17:48) >  IMPRESSION:  1.  The heart is enlarged. In particular, the atria and right ventricle are enlarged.  2.  No change in the biapical opacities and bronchiectasis allowing for differences in technique.    < end of copied text >     INTERVAL EVENTS: No o/n events. Denies CP, dyspnea, palpitations, presyncope, syncope, f/c/n/v.       REVIEW OF SYSTEMS:  Constitutional:     [X] negative [ ] fevers [ ] chills [ ] weight loss [ ] weight gain  HEENT:                  [X] negative [ ] dry eyes [ ] eye irritation [ ] postnasal drip [ ] nasal congestion  CV:                         [X] negative  [ ] chest pain [ ] orthopnea [ ] palpitations [ ] murmur  Resp:                     [X] negative [ ] cough [ ] shortness of breath [ ] dyspnea [ ] wheezing [ ] sputum [ ] hemoptysis  GI:                          [X] negative [ ] nausea [ ] vomiting [ ] diarrhea [ ] constipation [ ] abd pain [ ] dysphagia   :                        [X] negative [ ] dysuria [ ] nocturia [ ] hematuria [ ] increased urinary frequency  MSK:                      [X] negative [ ] back pain [ ] myalgias [ ] arthralgias [ ] fracture  Skin:                       [X] negative [ ] rash [ ] itch  Neuro:                   [X] negative [ ] headache [ ] dizziness [ ] syncope [ ] weakness [ ] numbness  Psych:                    [X] negative [ ] anxiety [ ] depression  Endo:                     [X] negative [ ] diabetes [ ] thyroid problem  Heme/Lymph:      [X] negative [ ] anemia [ ] bleeding problem  Allergic/Immune: [X] negative [ ] itchy eyes [ ] nasal discharge [ ] hives [ ] angioedema  [X] All other systems negative or otherwise described above.      PAST MEDICAL & SURGICAL HISTORY:  Atrial fibrillation  MVP (Mitral Valve Prolapse)  Osteoarthritis  Hypertension  Hyperlipidemia  History of right hip replacement  H/O dilation and curettage  Polyp of Ureter  S/P Tonsillectomy      MEDICATIONS  (STANDING):  albuterol/ipratropium for Nebulization 3 milliLiter(s) Nebulizer two times a day  fluticasone propionate 50 MICROgram(s)/spray Nasal Spray 1 Spray(s) Both Nostrils two times a day  furosemide   Injectable 40 milliGRAM(s) IV Push two times a day  pantoprazole  Injectable 40 milliGRAM(s) IV Push every 12 hours  polyethylene glycol 3350 17 Gram(s) Oral daily    MEDICATIONS  (PRN):  ALBUTerol    0.083% 2.5 milliGRAM(s) Nebulizer every 6 hours PRN Shortness of Breath and/or Wheezing      Vital Signs Last 24 Hrs  T(C): 36.6 (22 Mar 2021 04:44), Max: 36.6 (22 Mar 2021 04:44)  T(F): 97.8 (22 Mar 2021 04:44), Max: 97.8 (22 Mar 2021 04:44)  HR: 67 (22 Mar 2021 05:45) (61 - 79)  BP: 114/66 (22 Mar 2021 04:44) (107/64 - 118/63)  RR: 18 (22 Mar 2021 04:44) (18 - 18)  SpO2: 99% (22 Mar 2021 05:45) (95% - 100%)       PHYSICAL EXAM:  GEN: Awake, alert. NAD.   HEENT: NCAT, PERRL, EOMI. Mucosa moist.   RESP: CTA b/l  CV: RRR. Normal S1/S2. No r/g. Systolic murmur  ABD: Soft. NT/ND. BS+  EXT: Warm. No clubbing, or cyanosis. 2+ pitting edema to the shins  NEURO: AAOx3. No focal deficits.       LABS:                      8.8    6.18  )-----------( 140      ( 22 Mar 2021 06:36 )             28.8     03-22  138  |  101  |  41<H>  ----------------------------<  92  4.5   |  30  |  1.33<H>    Ca    8.6      22 Mar 2021 06:35  Phos  2.2     03-22  Mg     2.1     03-22    PT/INR - ( 22 Mar 2021 06:36 )   PT: 13.4 sec;   INR: 1.12 ratio          I&O's Summary  21 Mar 2021 07:01  -  22 Mar 2021 07:00  --------------------------------------------------------  IN: 350 mL / OUT: 3650 mL / NET: -3300 mL      Transthoracic Echocardiogram (03.19.21 @ 15:07) >  Conclusions:  1. Increased relative wall thickness with normal left ventricular mass index, consistent with concentric left ventricular remodeling.  2. Normal left ventricular systolic function. No segmental wall motion abnormalities.  3. Severe right atrial enlargement.  4. Severe Right ventricular enlargement 5.8cm  with reduced right ventricular systolic function.  5. Estimated right ventricular systolic pressure equals 55 mm Hg, assuming right atrial pressure equals 15 mm Hg, consistent with moderate pulmonary hypertension.   6. Normal tricuspid valve. Severe tricuspid regurgitation.   7. Small pericardial effusion. No echocardiographic evidence of pericardial tamponade.  ------------------------------------------------------------------------  Confirmed on  3/19/2021 - 19:30:11 by JASON Kim  ------------------------------------------------------------------------      Transthoracic Echocardiogram (04.22.19 @ 23:23) >  Conclusions:  1. Mitral annular calcification, otherwise normal mitral valve. Moderate mitral regurgitation.  2. Sclerotic aortic valve leaflets with normal opening. Mild-moderate aortic regurgitation.  3. Increased relative wall thickness with normal left ventricular mass index, consistent with concentric left ventricular remodeling.  4. Normal left ventricular systolic function. No segmental wall motion abnormalities.  5. Severe right atrial enlargement.  6. Right ventricular enlargement with decreased right ventricular systolic function.  7. Estimated right ventricular systolic pressure equals 47 mm Hg, assuming right atrial pressure equals 8 mm Hg, consistent with mild pulmonary hypertension.   8. Normal tricuspid valve. Severe tricuspid regurgitation.  ------------------------------------------------------------------------  Confirmed on  4/22/2019 - 15:01:01 by Raysa White M.D.  -----------------------------------------------------------------------        CT Chest No Cont (03.20.21 @ 17:48) >  IMPRESSION:  1.  The heart is enlarged. In particular, the atria and right ventricle are enlarged.  2.  No change in the biapical opacities and bronchiectasis allowing for differences in technique.

## 2021-03-22 NOTE — PROGRESS NOTE ADULT - PROBLEM SELECTOR PLAN 8
DVT: SQh  Diet: DASH diet  PT eval DVT: SQh  Diet: DASH diet  PT - recommend rehab. family in support of rehab, pt reluctant. will f/u

## 2021-03-22 NOTE — PROGRESS NOTE ADULT - PROBLEM SELECTOR PLAN 2
follows with Dr. Allen outpt. Recently dc'ed losartan and torsemide in setting of worsening creatinine; 10 days prior to admission. Currently with peripheral fluid overload on exam, lower suspicion for pulm overload. Clinical picture c/w pulmonary HTN; possibly 2/2 primary lung process such as ILD  - Outpatient TTE w/moderate pulmonary HTN; RV systolic dysfunction  -Repeat echo similar  -IV lasix 40mg BID, urine output adequate  -Further diuresis pending cards c/s follows with Dr. Allen outpt. Recently dc'ed losartan and torsemide in setting of worsening creatinine; 10 days prior to admission. Currently with peripheral fluid overload on exam likely 2/2 pulmonary HTN; possibly 2/2 primary lung process such as ILD  - IV lasix 40mg BID, urine output adequate  - cardiology was consulted - recommend diuresis as above

## 2021-03-22 NOTE — PROGRESS NOTE ADULT - PROBLEM SELECTOR PLAN 3
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 baseline Cr ~1.   - c/w IV lasix 40mg BID  - trend Cr  - avoid nephrotoxic drugs, renally dose meds

## 2021-03-22 NOTE — PROGRESS NOTE ADULT - ASSESSMENT
89 y/o F with Afib on eliquis, HTN, HLD who presents with Shortness of breath. TTE found to have dilated LA, with preserved EF and decreased RV function with elevated RV pressures. CT chest with bronchiectasis and no signs of fibrosis.     #SOB  DDx: Likely elevated left sided filling pressures leading to pulm HTN.  -significant diuresis over the last shift; would continue IV diuresis with Lasix 40mg IV QD  -monitor Cr  -Monitor i/o, monitor daily standing weights  -Telemetry monitoring    Sunny Moseley MD  Cardiology Fellow - F1  Text or Call: [phone]  For all New Consults and Questions:  [email or web address]   Login: [employer] 89 y/o F with Afib on eliquis, HTN, HLD who presents with Shortness of breath. TTE found to have dilated LA, with preserved EF and decreased RV function with elevated RV pressures. CT chest with bronchiectasis and no signs of fibrosis.     #SOB  DDx: Likely elevated left sided filling pressures leading to pulm HTN.  -significant diuresis over the last shift; would continue IV diuresis with Lasix 40mg IV BID  -monitor Cr  -Monitor i/o, monitor daily standing weights  -Telemetry monitoring    Sunny Moseley MD  Cardiology Fellow - F1  Text or Call: [phone]  For all New Consults and Questions:  [email or web address]   Login: [employer] 89 y/o F with Afib on Eliquis, HTN, & HLD, who presented with Shortness of breath.   TTE found to have dilated LA, with preserved EF and decreased RV function with elevated RV pressures. CT chest with bronchiectasis and no signs of fibrosis.       #1.  SOB  DDx: Likely elevated left sided filling pressures leading to pulm HTN.  - significant diuresis over the last shift; would continue IV diuresis with Lasix 40mg IV BID  - monitor Cr  - Monitor i/o, monitor daily standing weights  - Telemetry monitoring      Sunny Moseley MD  Cardiology Fellow - F1  Text or Call: [phone]    Chad Bauer M.D.  Cardiology Attending, Consult Service    For Cardiology consults and questions, all Cardiology service information can be found 24/7 on [email or web address], password: [email or web address]

## 2021-03-22 NOTE — PROGRESS NOTE ADULT - SUBJECTIVE AND OBJECTIVE BOX
Precious Howard PGY1    Patient is a 89y old  Female who presents with a chief complaint of SOB, AMS (21 Mar 2021 10:47)      SUBJECTIVE / OVERNIGHT EVENTS:  - overnight - given miralax for constipation    MEDICATIONS  (STANDING):  albuterol/ipratropium for Nebulization 3 milliLiter(s) Nebulizer two times a day  fluticasone propionate 50 MICROgram(s)/spray Nasal Spray 1 Spray(s) Both Nostrils two times a day  furosemide   Injectable 40 milliGRAM(s) IV Push two times a day  pantoprazole  Injectable 40 milliGRAM(s) IV Push every 12 hours  polyethylene glycol 3350 17 Gram(s) Oral daily    MEDICATIONS  (PRN):  ALBUTerol    0.083% 2.5 milliGRAM(s) Nebulizer every 6 hours PRN Shortness of Breath and/or Wheezing      CAPILLARY BLOOD GLUCOSE        I&O's Summary    21 Mar 2021 07:01  -  22 Mar 2021 07:00  --------------------------------------------------------  IN: 350 mL / OUT: 3650 mL / NET: -3300 mL        Vital Signs Last 24 Hrs  T(C): 36.6 (22 Mar 2021 04:44), Max: 36.6 (22 Mar 2021 04:44)  T(F): 97.8 (22 Mar 2021 04:44), Max: 97.8 (22 Mar 2021 04:44)  HR: 67 (22 Mar 2021 05:45) (61 - 79)  BP: 114/66 (22 Mar 2021 04:44) (107/64 - 118/63)  BP(mean): --  RR: 18 (22 Mar 2021 04:44) (18 - 18)  SpO2: 99% (22 Mar 2021 05:45) (95% - 100%)    PHYSICAL EXAM:  GENERAL: no distress  PSYCH: A&O x3  HEAD: Atraumatic, Normocephalic  NECK: Supple, No JVD  CHEST/LUNG: clear to auscultation bilaterally  HEART: regular rate and rhythm, no murmurs  ABDOMEN: nontender to palpation, no rebound tenderness/guarding  EXTREMITIES: no edema on bilateral LE  NEUROLOGY: no focal neurologic deficit  SKIN: No rashes or lesions    LABS:                        8.8    6.18  )-----------( 140      ( 22 Mar 2021 06:36 )             28.8      03-22    138  |  101  |  41<H>  ----------------------------<  92  4.5   |  30  |  1.33<H>    Ca    8.6      22 Mar 2021 06:35  Phos  2.2     03-22  Mg     2.1     03-22      PT/INR - ( 22 Mar 2021 06:36 )   PT: 13.4 sec;   INR: 1.12 ratio                   RADIOLOGY & ADDITIONAL TESTS:    Imaging Personally Reviewed:    Consultant(s) Notes Reviewed:      Care Discussed with Consultants/Other Providers:   Precious Howard PGY1    Patient is a 89y old  Female who presents with a chief complaint of SOB, AMS (21 Mar 2021 10:47)      SUBJECTIVE / OVERNIGHT EVENTS:  - overnight - given miralax for constipation    MEDICATIONS  (STANDING):  albuterol/ipratropium for Nebulization 3 milliLiter(s) Nebulizer two times a day  fluticasone propionate 50 MICROgram(s)/spray Nasal Spray 1 Spray(s) Both Nostrils two times a day  furosemide   Injectable 40 milliGRAM(s) IV Push two times a day  pantoprazole  Injectable 40 milliGRAM(s) IV Push every 12 hours  polyethylene glycol 3350 17 Gram(s) Oral daily    MEDICATIONS  (PRN):  ALBUTerol    0.083% 2.5 milliGRAM(s) Nebulizer every 6 hours PRN Shortness of Breath and/or Wheezing      CAPILLARY BLOOD GLUCOSE        I&O's Summary    21 Mar 2021 07:01  -  22 Mar 2021 07:00  --------------------------------------------------------  IN: 350 mL / OUT: 3650 mL / NET: -3300 mL        Vital Signs Last 24 Hrs  T(C): 36.6 (22 Mar 2021 04:44), Max: 36.6 (22 Mar 2021 04:44)  T(F): 97.8 (22 Mar 2021 04:44), Max: 97.8 (22 Mar 2021 04:44)  HR: 67 (22 Mar 2021 05:45) (61 - 79)  BP: 114/66 (22 Mar 2021 04:44) (107/64 - 118/63)  BP(mean): --  RR: 18 (22 Mar 2021 04:44) (18 - 18)  SpO2: 99% (22 Mar 2021 05:45) (95% - 100%)    PHYSICAL EXAM:  GENERAL: no distress  PSYCH: A&O x3  HEAD: Atraumatic, Normocephalic  NECK: Supple, No JVD  CHEST/LUNG: clear to auscultation bilaterally  HEART: regular rate and rhythm, no murmurs  ABDOMEN: nontender to palpation, no rebound tenderness/guarding  EXTREMITIES: 2+ up to thighs  NEUROLOGY: no focal neurologic deficit  SKIN: No rashes     LABS:                        8.8    6.18  )-----------( 140      ( 22 Mar 2021 06:36 )             28.8      03-22    138  |  101  |  41<H>  ----------------------------<  92  4.5   |  30  |  1.33<H>    Ca    8.6      22 Mar 2021 06:35  Phos  2.2     03-22  Mg     2.1     03-22      PT/INR - ( 22 Mar 2021 06:36 )   PT: 13.4 sec;   INR: 1.12 ratio                   RADIOLOGY & ADDITIONAL TESTS:    Imaging Personally Reviewed:    Consultant(s) Notes Reviewed:      Care Discussed with Consultants/Other Providers:   Precious Howard PGY1    Patient is a 89y old  Female who presents with a chief complaint of SOB, AMS (21 Mar 2021 10:47)      SUBJECTIVE / OVERNIGHT EVENTS:  - overnight - given miralax for constipation  - am - A&O x3, follows commands, responds coherently. no pain. no sob.     MEDICATIONS  (STANDING):  albuterol/ipratropium for Nebulization 3 milliLiter(s) Nebulizer two times a day  fluticasone propionate 50 MICROgram(s)/spray Nasal Spray 1 Spray(s) Both Nostrils two times a day  furosemide   Injectable 40 milliGRAM(s) IV Push two times a day  pantoprazole  Injectable 40 milliGRAM(s) IV Push every 12 hours  polyethylene glycol 3350 17 Gram(s) Oral daily    MEDICATIONS  (PRN):  ALBUTerol    0.083% 2.5 milliGRAM(s) Nebulizer every 6 hours PRN Shortness of Breath and/or Wheezing      CAPILLARY BLOOD GLUCOSE        I&O's Summary    21 Mar 2021 07:01  -  22 Mar 2021 07:00  --------------------------------------------------------  IN: 350 mL / OUT: 3650 mL / NET: -3300 mL        Vital Signs Last 24 Hrs  T(C): 36.6 (22 Mar 2021 04:44), Max: 36.6 (22 Mar 2021 04:44)  T(F): 97.8 (22 Mar 2021 04:44), Max: 97.8 (22 Mar 2021 04:44)  HR: 67 (22 Mar 2021 05:45) (61 - 79)  BP: 114/66 (22 Mar 2021 04:44) (107/64 - 118/63)  BP(mean): --  RR: 18 (22 Mar 2021 04:44) (18 - 18)  SpO2: 99% (22 Mar 2021 05:45) (95% - 100%)    PHYSICAL EXAM:  GENERAL: lying down, no distress  PSYCH: A&O x3  CHEST/LUNG: clear to auscultation bilaterally  HEART: regular rate and rhythm, no murmurs  ABDOMEN: nontender to palpation, no rebound tenderness/guarding  EXTREMITIES: 2+ up to thighs  NEUROLOGY: moves all extremities    LABS:                        8.8    6.18  )-----------( 140      ( 22 Mar 2021 06:36 )             28.8      03-22    138  |  101  |  41<H>  ----------------------------<  92  4.5   |  30  |  1.33<H>    Ca    8.6      22 Mar 2021 06:35  Phos  2.2     03-22  Mg     2.1     03-22      PT/INR - ( 22 Mar 2021 06:36 )   PT: 13.4 sec;   INR: 1.12 ratio                   RADIOLOGY & ADDITIONAL TESTS:    Imaging Personally Reviewed:    Consultant(s) Notes Reviewed:      Care Discussed with Consultants/Other Providers:

## 2021-03-22 NOTE — PROGRESS NOTE ADULT - ASSESSMENT
89F PMH AF (on eliquis), HTN, HLD p/w daughter in law c/o one week of increasing delirium / confusion, worsening sob, decreasing activity x 1 week and found to have acute hypoxic respiratory failure; ddx  primary lung process driving pulmonary HTN vs. RV failure.    89F PMH AF (on eliquis), HTN, HLD admitted for worsening delirium, sob, lethargy 1 wk, found to have acute hypoxic respiratory failure likely 2/2 pulm HTN, elevated L filling pressure on IV lasix (cardiology following). Anemia, holding eliquis. d/c pending rehab vs home.

## 2021-03-22 NOTE — PROGRESS NOTE ADULT - PROBLEM SELECTOR PLAN 4
Pt Reported progressive weakness 10-14 prior to admission  Also reporting intermittent melena. Baseline hgb 11-13 up until 06/20 per outpatient notes. Presents with hgb 9.4. Pt also has persistent gross hematuria initially thought to be due to traumatic mckoy placement  Eliquis held on admission given concern for symptomatic anemia. Hgb stable throughout hospital course despite hematuria.  No melena.   Per [facility] family does not want invasive procedures including endoscopy  c/w IV protonix.   WIll need cardiology input on whether pt should resume Eliquis once hematuria resolves. Pt Reported progressive weakness 10-14 prior to admission  Also reporting intermittent melena. Baseline hgb 11-13 up until 06/20 per outpatient notes. Presents with hgb 9.4. Pt also has persistent gross hematuria initially thought to be due to traumatic mckoy placement  Eliquis held on admission given concern for symptomatic anemia. Hgb stable throughout hospital course despite hematuria.  No melena.   Per [facility] family does not want invasive procedures including endoscopy  c/w IV protonix 40mg BID  WIll need cardiology input on whether pt should resume Eliquis once hematuria resolves.  - Fe serum and TIBC added on today am labs - f/u

## 2021-03-22 NOTE — PROGRESS NOTE ADULT - PROBLEM SELECTOR PLAN 5
on home coreg and eliquis  [id number] 4  -holding coreg in the setting of bradycardia  - eliquis held due to concern for symptomatic anemia and hematuria; appreciate cardiology recs regarding cessation or resumption.   -Cadiology c/s; recs pending  -c/w eliquis home meds: coreg and eliquis  [id number] 4  - was holding coreg in the setting of bradycardia  - eliquis held due to concern for symptomatic anemia and hematuria; appreciate cardiology recs regarding cessation or resumption.   - will restart coreg today (3/22) as HR in 70s

## 2021-03-22 NOTE — PROGRESS NOTE ADULT - PROBLEM SELECTOR PLAN 1
Possible ILD vs. acute on chronic HF vs infection (less likely given no focal consolidation on xray, procal unimpressive, change in cough) vs PE (less likely given on home eliquis)  CT chest w/ chronic bronchiectasis w/overlaying mosacism. + small b/l pleural effusions  -Repeat TTE fairly consistent w/prior (last one 2019 showed severe TR, decreased RV systolic function, moderate MR. LV systolic fx wnl)  -wean O2 as tolerated, now on RA   -DVT study is negative; PE less likely  - duonebs prn per pulm Possible ILD vs. acute on chronic HF (infection less likely given no focal consolidation on xray, procal unimpressive, change in cough) vs PE (less likely given on home eliquis)  CT chest w/ chronic bronchiectasis + small b/l pleural effusions  -Repeat TTE 3/19 EF 65%; enlarged RA and RV, reduced RV systolic function, moderate pulm HTN  - wean O2 as tolerated, now on RA   - DVT study is negative; PE less likely. pt was also on eliquis.   - duonebs prn per pulm  - diuretics as below Possible ILD vs. acute on chronic HF (infection less likely given no focal consolidation on xray, procal unimpressive, change in cough)   CT chest w/ chronic bronchiectasis + small b/l pleural effusions  -Repeat TTE 3/19 EF 65%; enlarged RA and RV, reduced RV systolic function, moderate pulm HTN  - wean O2 as tolerated, now on RA   - DVT study is negative; PE less likely. pt was also on eliquis.   - duonebs prn per pulm  - diuretics as below

## 2021-03-22 NOTE — PROGRESS NOTE ADULT - PROBLEM SELECTOR PLAN 6
Left leg; initial c/f cellulitis and pt is s/p cefazolin x2d; abx d/c  -low suspicion for cellulitis   -venous stasis ulcer is covered in clean based eschar w/o purulence  -ctm off antibiotics

## 2021-03-22 NOTE — PROGRESS NOTE ADULT - ATTENDING COMMENTS
pt s/e. only complaint is constipation but no abd pain/n/v. PE: 2+ edema up to thighs; 96% RA taken by me- pt was off oxygen; non-focal; abd benign    - cont Lasix. monitor bp/labs  - constipation- lactulose and suppository added

## 2021-03-22 NOTE — PROGRESS NOTE ADULT - PROBLEM SELECTOR PLAN 7
on home coreg  -holding at this time for bradycardia; can resume as tolerated on home coreg  - will restart coreg as HR in 70s (held for bradycardia this admission) on home coreg  -monitor on Lasix

## 2021-03-22 NOTE — PROGRESS NOTE ADULT - NSHPATTENDINGPLANDISCUSS_GEN_ALL_CORE
cardiology fellow; patient seen and examined.       I was physically present for the key portions of the evaluation and management (E/M) service provided.    I agree with the above history, physical, and plan which I have reviewed and edited where appropriate.
Team 1 residents
Team 1 residents

## 2021-03-23 NOTE — PROGRESS NOTE ADULT - PROBLEM SELECTOR PLAN 5
home meds: coreg and eliquis  [id number] 4  - was holding coreg in the setting of bradycardia  - eliquis held due to concern for symptomatic anemia and hematuria; appreciate cardiology recs regarding cessation or resumption.   - will restart coreg today (3/22) as HR in 70s

## 2021-03-23 NOTE — PROGRESS NOTE ADULT - PROBLEM SELECTOR PLAN 1
Possible ILD vs. acute on chronic HF (infection less likely given no focal consolidation on xray, procal unimpressive, change in cough)   CT chest w/ chronic bronchiectasis + small b/l pleural effusions  -Repeat TTE 3/19 EF 65%; enlarged RA and RV, reduced RV systolic function, moderate pulm HTN  - wean O2 as tolerated, now on RA   - DVT study is negative; PE less likely. pt was also on eliquis.   - duonebs prn per pulm  - diuretics as below

## 2021-03-23 NOTE — PROGRESS NOTE ADULT - ASSESSMENT
89F PMH AF (on eliquis), HTN, HLD admitted for worsening delirium, sob, lethargy 1 wk, found to have acute hypoxic respiratory failure likely 2/2 pulm HTN, elevated L filling pressure on IV lasix (cardiology following). Anemia, holding eliquis. d/c pending rehab vs home.    89F PMH AF (on eliquis), HTN, HLD admitted for worsening delirium, sob, lethargy 1 wk, found to have acute hypoxic respiratory failure likely 2/2 pulm HTN, elevated L filling pressure on IV lasix (cardiology following). Anemia, holding eliquis. d/c to rehab pending diuresis with IV lasix.

## 2021-03-23 NOTE — PROGRESS NOTE ADULT - PROBLEM SELECTOR PLAN 4
Pt Reported progressive weakness 10-14 prior to admission  Also reporting intermittent melena. Baseline hgb 11-13 up until 06/20 per outpatient notes. Presents with hgb 9.4. Pt also has persistent gross hematuria initially thought to be due to traumatic mckoy placement  Eliquis held on admission given concern for symptomatic anemia. Hgb stable throughout hospital course despite hematuria.  No melena.   Per [facility] family does not want invasive procedures including endoscopy  c/w IV protonix 40mg BID  WIll need cardiology input on whether pt should resume Eliquis once hematuria resolves.  - Fe serum and TIBC added on today am labs - f/u Also reporting intermittent melena. Baseline hgb 11-13 up until 06/20 per outpatient notes. Presents with hgb 9.4. Pt also has persistent gross hematuria initially thought to be due to traumatic mckoy placement  Eliquis held on admission given concern for symptomatic anemia. Hgb stable throughout hospital course despite hematuria.  No melena.   Per [facility] family does not want invasive procedures including endoscopy  c/w IV protonix 40mg BID  WIll need cardiology input on whether pt should resume Eliquis once hematuria resolves.  - Fe serum and TIBC added on today am labs - f/u Also reporting intermittent melena outpt. Baseline hgb 11-13 up until 06/20 per outpatient notes. Presents with hgb 9.4. Pt also has persistent gross hematuria initially thought to be due to traumatic mckoy placement  Eliquis held on admission given concern for symptomatic anemia. Hgb stable throughout hospital course despite hematuria.  No melena.   Per [facility] family does not want invasive procedures including endoscopy  c/w IV protonix 40mg BID

## 2021-03-23 NOTE — PROGRESS NOTE ADULT - SUBJECTIVE AND OBJECTIVE BOX
Precious Lawrence PGY1    Patient is a 89y old  Female who presents with a chief complaint of SOB, AMS (22 Mar 2021 10:51)      SUBJECTIVE / OVERNIGHT EVENTS:  - overnight - FOBT positive  -     MEDICATIONS  (STANDING):  albuterol/ipratropium for Nebulization 3 milliLiter(s) Nebulizer two times a day  fluticasone propionate 50 MICROgram(s)/spray Nasal Spray 1 Spray(s) Both Nostrils two times a day  furosemide   Injectable 40 milliGRAM(s) IV Push two times a day  heparin   Injectable 5000 Unit(s) SubCutaneous every 12 hours  lactulose Syrup 20 Gram(s) Oral two times a day  pantoprazole  Injectable 40 milliGRAM(s) IV Push every 12 hours  polyethylene glycol 3350 17 Gram(s) Oral daily    MEDICATIONS  (PRN):  ALBUTerol    0.083% 2.5 milliGRAM(s) Nebulizer every 6 hours PRN Shortness of Breath and/or Wheezing      CAPILLARY BLOOD GLUCOSE      POCT Blood Glucose.: 209 mg/dL (22 Mar 2021 12:43)  POCT Blood Glucose.: 103 mg/dL (22 Mar 2021 08:51)    I&O's Summary    22 Mar 2021 07:01  -  23 Mar 2021 07:00  --------------------------------------------------------  IN: 0 mL / OUT: 2150 mL / NET: -2150 mL        Vital Signs Last 24 Hrs  T(C): 36.6 (23 Mar 2021 04:42), Max: 36.7 (22 Mar 2021 14:20)  T(F): 97.8 (23 Mar 2021 04:42), Max: 98.1 (22 Mar 2021 14:20)  HR: 76 (23 Mar 2021 06:55) (68 - 94)  BP: 123/64 (23 Mar 2021 04:42) (117/62 - 129/73)  BP(mean): --  RR: 18 (23 Mar 2021 04:42) (18 - 20)  SpO2: 98% (23 Mar 2021 06:55) (93% - 98%)    PHYSICAL EXAM:  GENERAL: lying down, no distress  PSYCH: A&O x3  CHEST/LUNG: clear to auscultation bilaterally  HEART: regular rate and rhythm, no murmurs  ABDOMEN: nontender to palpation, no rebound tenderness/guarding  EXTREMITIES: 2+ up to thighs  NEUROLOGY: moves all extremities    LABS:                        8.8    6.18  )-----------( 140      ( 22 Mar 2021 06:36 )             28.8      03-22    138  |  101  |  41<H>  ----------------------------<  92  4.5   |  30  |  1.33<H>    Ca    8.6      22 Mar 2021 06:35  Phos  2.2     03-22  Mg     2.1     03-22      PT/INR - ( 22 Mar 2021 06:36 )   PT: 13.4 sec;   INR: 1.12 ratio                   RADIOLOGY & ADDITIONAL TESTS:    Imaging Personally Reviewed:    Consultant(s) Notes Reviewed:      Care Discussed with Consultants/Other Providers:   Precious Howard PGY1    Patient is a 89y old  Female who presents with a chief complaint of SOB, AMS (22 Mar 2021 10:51)      SUBJECTIVE / OVERNIGHT EVENTS:  - overnight - FOBT positive  - pt is okay with rehab ([facility]). family also okay with [facility].     MEDICATIONS  (STANDING):  albuterol/ipratropium for Nebulization 3 milliLiter(s) Nebulizer two times a day  fluticasone propionate 50 MICROgram(s)/spray Nasal Spray 1 Spray(s) Both Nostrils two times a day  furosemide   Injectable 40 milliGRAM(s) IV Push two times a day  heparin   Injectable 5000 Unit(s) SubCutaneous every 12 hours  lactulose Syrup 20 Gram(s) Oral two times a day  pantoprazole  Injectable 40 milliGRAM(s) IV Push every 12 hours  polyethylene glycol 3350 17 Gram(s) Oral daily    MEDICATIONS  (PRN):  ALBUTerol    0.083% 2.5 milliGRAM(s) Nebulizer every 6 hours PRN Shortness of Breath and/or Wheezing      CAPILLARY BLOOD GLUCOSE      POCT Blood Glucose.: 209 mg/dL (22 Mar 2021 12:43)  POCT Blood Glucose.: 103 mg/dL (22 Mar 2021 08:51)    I&O's Summary    22 Mar 2021 07:01  -  23 Mar 2021 07:00  --------------------------------------------------------  IN: 0 mL / OUT: 2150 mL / NET: -2150 mL        Vital Signs Last 24 Hrs  T(C): 36.6 (23 Mar 2021 04:42), Max: 36.7 (22 Mar 2021 14:20)  T(F): 97.8 (23 Mar 2021 04:42), Max: 98.1 (22 Mar 2021 14:20)  HR: 76 (23 Mar 2021 06:55) (68 - 94)  BP: 123/64 (23 Mar 2021 04:42) (117/62 - 129/73)  BP(mean): --  RR: 18 (23 Mar 2021 04:42) (18 - 20)  SpO2: 98% (23 Mar 2021 06:55) (93% - 98%)    PHYSICAL EXAM:  GENERAL: lying down, no distress  PSYCH: A&O x3  CHEST/LUNG: clear to auscultation bilaterally  HEART: regular rate and rhythm, no murmurs  ABDOMEN: nontender to palpation, no rebound tenderness/guarding  EXTREMITIES: 2+ up to thighs  NEUROLOGY: moves all extremities    LABS:                        8.8    6.18  )-----------( 140      ( 22 Mar 2021 06:36 )             28.8      03-22    138  |  101  |  41<H>  ----------------------------<  92  4.5   |  30  |  1.33<H>    Ca    8.6      22 Mar 2021 06:35  Phos  2.2     03-22  Mg     2.1     03-22      PT/INR - ( 22 Mar 2021 06:36 )   PT: 13.4 sec;   INR: 1.12 ratio                   RADIOLOGY & ADDITIONAL TESTS:    Imaging Personally Reviewed:    Consultant(s) Notes Reviewed:      Care Discussed with Consultants/Other Providers:   Precious Howard PGY1    Patient is a 89y old  Female who presents with a chief complaint of SOB, AMS (22 Mar 2021 10:51)      SUBJECTIVE / OVERNIGHT EVENTS:  - overnight - FOBT positive. (cardiology recommended no AC)  - pt is okay with rehab ([facility]). family also okay with [facility]. has L leg pain.     MEDICATIONS  (STANDING):  albuterol/ipratropium for Nebulization 3 milliLiter(s) Nebulizer two times a day  fluticasone propionate 50 MICROgram(s)/spray Nasal Spray 1 Spray(s) Both Nostrils two times a day  furosemide   Injectable 40 milliGRAM(s) IV Push two times a day  heparin   Injectable 5000 Unit(s) SubCutaneous every 12 hours  lactulose Syrup 20 Gram(s) Oral two times a day  pantoprazole  Injectable 40 milliGRAM(s) IV Push every 12 hours  polyethylene glycol 3350 17 Gram(s) Oral daily    MEDICATIONS  (PRN):  ALBUTerol    0.083% 2.5 milliGRAM(s) Nebulizer every 6 hours PRN Shortness of Breath and/or Wheezing      CAPILLARY BLOOD GLUCOSE      POCT Blood Glucose.: 209 mg/dL (22 Mar 2021 12:43)  POCT Blood Glucose.: 103 mg/dL (22 Mar 2021 08:51)    I&O's Summary    22 Mar 2021 07:01  -  23 Mar 2021 07:00  --------------------------------------------------------  IN: 0 mL / OUT: 2150 mL / NET: -2150 mL        Vital Signs Last 24 Hrs  T(C): 36.6 (23 Mar 2021 04:42), Max: 36.7 (22 Mar 2021 14:20)  T(F): 97.8 (23 Mar 2021 04:42), Max: 98.1 (22 Mar 2021 14:20)  HR: 76 (23 Mar 2021 06:55) (68 - 94)  BP: 123/64 (23 Mar 2021 04:42) (117/62 - 129/73)  BP(mean): --  RR: 18 (23 Mar 2021 04:42) (18 - 20)  SpO2: 98% (23 Mar 2021 06:55) (93% - 98%)    PHYSICAL EXAM:  GENERAL: lying down, no distress  PSYCH: A&O x3  CHEST/LUNG: clear to auscultation bilaterally  HEART: regular rate and rhythm, no murmurs  ABDOMEN: nontender to palpation, no rebound tenderness/guarding  EXTREMITIES:2+ pitting edema LE bilaterally. L leg in ace wrap  NEUROLOGY: moves all extremities    LABS:                        8.8    6.18  )-----------( 140      ( 22 Mar 2021 06:36 )             28.8      03-22    138  |  101  |  41<H>  ----------------------------<  92  4.5   |  30  |  1.33<H>    Ca    8.6      22 Mar 2021 06:35  Phos  2.2     03-22  Mg     2.1     03-22      PT/INR - ( 22 Mar 2021 06:36 )   PT: 13.4 sec;   INR: 1.12 ratio                   RADIOLOGY & ADDITIONAL TESTS:    Imaging Personally Reviewed:    Consultant(s) Notes Reviewed:      Care Discussed with Consultants/Other Providers:   Precious Howard PGY1    Patient is a 89y old  Female who presents with a chief complaint of SOB, AMS (22 Mar 2021 10:51)      SUBJECTIVE / OVERNIGHT EVENTS:  - overnight - FOBT positive. (cardiology recommended no AC)  - pt is okay with rehab ([facility]). family also okay with [facility]. has L leg pain. no other complaints.     MEDICATIONS  (STANDING):  albuterol/ipratropium for Nebulization 3 milliLiter(s) Nebulizer two times a day  fluticasone propionate 50 MICROgram(s)/spray Nasal Spray 1 Spray(s) Both Nostrils two times a day  furosemide   Injectable 40 milliGRAM(s) IV Push two times a day  heparin   Injectable 5000 Unit(s) SubCutaneous every 12 hours  lactulose Syrup 20 Gram(s) Oral two times a day  pantoprazole  Injectable 40 milliGRAM(s) IV Push every 12 hours  polyethylene glycol 3350 17 Gram(s) Oral daily    MEDICATIONS  (PRN):  ALBUTerol    0.083% 2.5 milliGRAM(s) Nebulizer every 6 hours PRN Shortness of Breath and/or Wheezing      CAPILLARY BLOOD GLUCOSE      POCT Blood Glucose.: 209 mg/dL (22 Mar 2021 12:43)  POCT Blood Glucose.: 103 mg/dL (22 Mar 2021 08:51)    I&O's Summary    22 Mar 2021 07:01  -  23 Mar 2021 07:00  --------------------------------------------------------  IN: 0 mL / OUT: 2150 mL / NET: -2150 mL        Vital Signs Last 24 Hrs  T(C): 36.6 (23 Mar 2021 04:42), Max: 36.7 (22 Mar 2021 14:20)  T(F): 97.8 (23 Mar 2021 04:42), Max: 98.1 (22 Mar 2021 14:20)  HR: 76 (23 Mar 2021 06:55) (68 - 94)  BP: 123/64 (23 Mar 2021 04:42) (117/62 - 129/73)  BP(mean): --  RR: 18 (23 Mar 2021 04:42) (18 - 20)  SpO2: 98% (23 Mar 2021 06:55) (93% - 98%)    PHYSICAL EXAM:  GENERAL: lying down, no distress  PSYCH: A&O x3  CHEST/LUNG: clear to auscultation bilaterally  HEART: regular rate and rhythm, no murmurs  ABDOMEN: nontender to palpation, no rebound tenderness/guarding  EXTREMITIES:2+ pitting edema LE bilaterally. L leg in ace wrap  NEUROLOGY: moves all extremities    LABS:                        8.8    6.18  )-----------( 140      ( 22 Mar 2021 06:36 )             28.8      03-22    138  |  101  |  41<H>  ----------------------------<  92  4.5   |  30  |  1.33<H>    Ca    8.6      22 Mar 2021 06:35  Phos  2.2     03-22  Mg     2.1     03-22      PT/INR - ( 22 Mar 2021 06:36 )   PT: 13.4 sec;   INR: 1.12 ratio                   RADIOLOGY & ADDITIONAL TESTS:    Imaging Personally Reviewed:    Consultant(s) Notes Reviewed:      Care Discussed with Consultants/Other Providers:

## 2021-03-23 NOTE — PROGRESS NOTE ADULT - PROBLEM SELECTOR PLAN 2
follows with Dr. Allen outpt. Recently dc'ed losartan and torsemide in setting of worsening creatinine; 10 days prior to admission. Currently with peripheral fluid overload on exam likely 2/2 pulmonary HTN; possibly 2/2 primary lung process such as ILD  - IV lasix 40mg BID, urine output adequate  - cardiology was consulted - recommend diuresis as above follows with Dr. Allen outpt. Recently dc'ed losartan and torsemide in setting of worsening creatinine; 10 days prior to admission. Currently with peripheral fluid overload on exam likely 2/2 pulmonary HTN; possibly 2/2 primary lung process such as ILD  - IV lasix 40mg BID, urine output adequate. may be able to reduce tomorrow. discharge pending transition to oral diuresis.   - cardiology was consulted - recommend diuresis as above

## 2021-03-23 NOTE — PROGRESS NOTE ADULT - PROBLEM SELECTOR PLAN 3
likely 2/2 cardiorenal (Cr 1.34 -> worsened -> now downtrending on lasix). baseline Cr ~1.   - c/w IV lasix 40mg BID  - trend Cr  - avoid nephrotoxic drugs, renally dose meds likely 2/2 cardiorenal as improved on diuresis (baseline Cr 1)  - c/w IV lasix 40mg BID  - trend Cr  - avoid nephrotoxic drugs, renally dose meds

## 2021-03-23 NOTE — PROGRESS NOTE ADULT - ATTENDING COMMENTS
pt seen around 9:30 am. 89F PMH AF (on eliquis), HTN, HLD admitted for worsening delirium, sob, lethargy 1 wk, found to have acute hypoxic respiratory failure likely 2/2 pulm HTN, elevated L filling pressure on IV lasix (cardiology following). Anemia, holding eliquis. d/c to rehab pending diuresis with IV lasix. Pt feels good. +bm yest- brown per nurse. denies cp/sob. PE: urine color improving- less hematuria; nad; a+ox3; cta b/l; abd benign; non-focal; 1-2+ edema up to thighs    Acute hypoxic resp failure sec to HFpEF- cont Lasix. good UO.    Anemia- stable. hematuria improving. guiac +. holding apixaban.

## 2021-03-23 NOTE — PROGRESS NOTE ADULT - ASSESSMENT
89 y/o F with Afib on Eliquis, HTN, & HLD, who presented with Shortness of breath.   TTE found to have dilated LA, with preserved EF and decreased RV function with elevated RV pressures. CT chest with bronchiectasis and no signs of fibrosis.       #1.  SOB  DDx: Likely elevated left sided filling pressures leading to pulm HTN.  - significant diuresis over the last shift; would continue IV diuresis with Lasix 40mg IV BID as pt appears still overloaded; likely reduce diuretic tomorrow  - monitor Cr, hgb  - Monitor i/o, monitor daily standing weights  - Telemetry monitoring  - FOBT + and w/ dropping hgb, would c/w holding AC      Sunny Moseley MD  Cardiology Fellow - F1  Text or Call: [phone]   89 y/o F with Afib on Eliquis, HTN, & HLD, who presented with shortness of breath.   TTE found to have dilated LA, with preserved EF and decreased RV function with elevated RV pressures. CT chest with bronchiectasis and no signs of fibrosis.       #1.  SOB  DDx: Likely elevated left sided filling pressures leading to pulm HTN.  - significant diuresis over the last shift; would continue IV diuresis with Lasix 40mg IV BID as pt appears still overloaded; likely reduce diuretic tomorrow  - monitor Cr, hgb  - Monitor i/o, monitor daily standing weights  - Telemetry monitoring  - FOBT + and w/ dropping hgb, would c/w holding AC      Sunny Moseley MD  Cardiology Fellow - F1  Text or Call: [phone]    Plan discussed with cardiology fellow; patient seen and examined.       I was physically present for the key portions of the evaluation and management (E/M) service provided.    I agree with the above history, physical, and plan which I have reviewed and edited where appropriate.     Chad Bauer M.D.  Cardiology Attending, Consult Service    For Cardiology consults and questions, all Cardiology service information can be found 24/7 on [email or web address], password: [email or web address]

## 2021-03-23 NOTE — PROGRESS NOTE ADULT - SUBJECTIVE AND OBJECTIVE BOX
INTERVAL EVENTS: No o/n events. Reports LLE pain due to bandage being too tight. Denies CP, dyspnea, palpitations, presyncope, syncope, f/c/n/v.     REVIEW OF SYSTEMS:  Constitutional:     [X] negative [ ] fevers [ ] chills [ ] weight loss [ ] weight gain  HEENT:                  [X] negative [ ] dry eyes [ ] eye irritation [ ] postnasal drip [ ] nasal congestion  CV:                         [X] negative  [ ] chest pain [ ] orthopnea [ ] palpitations [ ] murmur  Resp:                     [X] negative [ ] cough [X] shortness of breath [ ] wheezing [ ] sputum [ ] hemoptysis  GI:                          [X] negative [ ] nausea [ ] vomiting [ ] diarrhea [ ] constipation [ ] abd pain [ ] dysphagia   :                        [X] negative [ ] dysuria [ ] nocturia [ ] hematuria [ ] increased urinary frequency  MSK:                      [X] negative [ ] back pain [ ] myalgias [ ] arthralgias [ ] fracture  Skin:                       [X] negative [ ] rash [ ] itch  Neuro:                   [X] negative [ ] headache [ ] dizziness [ ] syncope [ ] weakness [ ] numbness  Psych:                    [X] negative [ ] anxiety [ ] depression  Endo:                     [X] negative [ ] diabetes [ ] thyroid problem  Heme/Lymph:      [X] negative [ ] anemia [ ] bleeding problem  Allergic/Immune: [X] negative [ ] itchy eyes [ ] nasal discharge [ ] hives [ ] angioedema    [X] All other systems negative or otherwise described above.  [ ] Unable to assess ROS because ________.    PAST MEDICAL & SURGICAL HISTORY:  H/O cardiac arrhythmia    Atrial fibrillation    MVP (Mitral Valve Prolapse)    Osteoarthritis    Hypertension    Hyperlipidemia    History of right hip replacement    H/O dilation and curettage    Polyp of Ureter  removed many years ago    S/P Tonsillectomy  as child      MEDICATIONS  (STANDING):  albuterol/ipratropium for Nebulization 3 milliLiter(s) Nebulizer two times a day  fluticasone propionate 50 MICROgram(s)/spray Nasal Spray 1 Spray(s) Both Nostrils two times a day  furosemide   Injectable 40 milliGRAM(s) IV Push two times a day  heparin   Injectable 5000 Unit(s) SubCutaneous every 12 hours  lactulose Syrup 20 Gram(s) Oral two times a day  pantoprazole  Injectable 40 milliGRAM(s) IV Push every 12 hours  polyethylene glycol 3350 17 Gram(s) Oral daily    MEDICATIONS  (PRN):  ALBUTerol    0.083% 2.5 milliGRAM(s) Nebulizer every 6 hours PRN Shortness of Breath and/or Wheezing    ICU Vital Signs Last 24 Hrs  T(C): 36.6 (23 Mar 2021 04:42), Max: 36.7 (22 Mar 2021 14:20)  T(F): 97.8 (23 Mar 2021 04:42), Max: 98.1 (22 Mar 2021 14:20)  HR: 76 (23 Mar 2021 06:55) (68 - 94)  BP: 123/64 (23 Mar 2021 04:42) (117/62 - 129/73)  BP(mean): --  ABP: --  ABP(mean): --  RR: 18 (23 Mar 2021 04:42) (18 - 20)  SpO2: 98% (23 Mar 2021 06:55) (93% - 98%)    Daily     Daily     PHYSICAL EXAM:  GEN: Awake, alert. NAD.   HEENT: NCAT, PERRL, EOMI. Mucosa moist. No JVD.  RESP: CTA b/l  CV: RRR. Normal S1/S2. No m/r/g.  ABD: Soft. NT/ND. BS+  EXT: Warm. No clubbing, or cyanosis. LLE in bandaging. Edema 1+ pitting b/l in LE  NEURO: AAOx3. No focal deficits.     LABS:                        8.8    6.18  )-----------( 140      ( 22 Mar 2021 06:36 )             28.8     03-22    138  |  101  |  41<H>  ----------------------------<  92  4.5   |  30  |  1.33<H>    Ca    8.6      22 Mar 2021 06:35  Phos  2.2     03-22  Mg     2.1     03-22          PT/INR - ( 22 Mar 2021 06:36 )   PT: 13.4 sec;   INR: 1.12 ratio             I&O's Summary    22 Mar 2021 07:01  -  23 Mar 2021 07:00  --------------------------------------------------------  IN: 0 mL / OUT: 2150 mL / NET: -2150 mL      BNP    RADIOLOGY & ADDITIONAL STUDIES:    TELEMETRY: reviewed    EKG: reviewed      < from: Transthoracic Echocardiogram (03.19.21 @ 15:07) >  Conclusions:  1. Increased relative wall thickness with normal left  ventricularmass index, consistent with concentric left  ventricular remodeling.  2. Normal left ventricular systolic function. No segmental  wall motion abnormalities.  3. Severe right atrial enlargement.  4. Severe Right ventricular enlargement 5.8cm  with reduced   right ventricular systolic function.  5. Estimated right ventricular systolic pressure equals 55  mm Hg, assuming right atrial pressure equals 15 mm Hg,  consistent with moderate pulmonary hypertension.  6. Normal tricuspid valve. Severe tricuspid regurgitation.  7. Small pericardial effusion. No echocardiographic  evidence of pericardial tamponade.  ------------------------------------------------------------------------  Confirmed on  3/19/2021 - 19:30:11 by JASON Kim  ------------------------------------------------------------------------    < end of copied text >      < from: Transthoracic Echocardiogram (04.22.19 @ 23:23) >  Conclusions:  1. Mitral annular calcification, otherwise normal mitral  valve. Moderate mitral regurgitation.  2. Sclerotic aortic valve leaflets with normal opening.  Mild-moderate aortic regurgitation.  3. Increased relative wall thickness with normal left  ventricular mass index, consistent with concentric left  ventricular remodeling.  4. Normal left ventricular systolic function. No segmental  wall motion abnormalities.  5. Severe right atrial enlargement.  6. Right ventricular enlargement with decreased right  ventricular systolic function.  7. Estimated right ventricular systolic pressure equals 47  mm Hg, assuming right atrial pressure equals 8 mm Hg,  consistent with mild pulmonary hypertension.  8. Normal tricuspid valve. Severe tricuspid regurgitation.  *** No previous Echo exam.  ------------------------------------------------------------------------  Confirmed on  4/22/2019 - 15:01:01 by Raysa White M.D.  ------------------------------------------------------------------------    < end of copied text >     INTERVAL EVENTS: No o/n events. Reports LLE pain due to bandage being too tight. Denies CP, dyspnea, palpitations, presyncope, syncope, f/c/n/v.       REVIEW OF SYSTEMS:  Constitutional:     [X] negative [ ] fevers [ ] chills [ ] weight loss [ ] weight gain  HEENT:                  [X] negative [ ] dry eyes [ ] eye irritation [ ] postnasal drip [ ] nasal congestion  CV:                         [X] negative  [ ] chest pain [ ] orthopnea [ ] palpitations [ ] murmur  Resp:                     [X] negative [ ] cough [X] shortness of breath [ ] wheezing [ ] sputum [ ] hemoptysis  GI:                          [X] negative [ ] nausea [ ] vomiting [ ] diarrhea [ ] constipation [ ] abd pain [ ] dysphagia   :                        [X] negative [ ] dysuria [ ] nocturia [ ] hematuria [ ] increased urinary frequency  MSK:                      [X] negative [ ] back pain [ ] myalgias [ ] arthralgias [ ] fracture  Skin:                       [X] negative [ ] rash [ ] itch  Neuro:                   [X] negative [ ] headache [ ] dizziness [ ] syncope [ ] weakness [ ] numbness  Psych:                    [X] negative [ ] anxiety [ ] depression  Endo:                     [X] negative [ ] diabetes [ ] thyroid problem  Heme/Lymph:      [X] negative [ ] anemia [ ] bleeding problem  Allergic/Immune: [X] negative [ ] itchy eyes [ ] nasal discharge [ ] hives [ ] angioedema  [X] All other systems negative or otherwise described above.      PAST MEDICAL & SURGICAL HISTORY:  H/O cardiac arrhythmia  Atrial fibrillation  MVP (Mitral Valve Prolapse)  Osteoarthritis  Hypertension  Hyperlipidemia  History of right hip replacement  H/O dilation and curettage  Polyp of Ureter, removed many years ago  S/P Tonsillectomy,   as child      MEDICATIONS  (STANDING):  albuterol/ipratropium for Nebulization 3 milliLiter(s) Nebulizer two times a day  fluticasone propionate 50 MICROgram(s)/spray Nasal Spray 1 Spray(s) Both Nostrils two times a day  furosemide   Injectable 40 milliGRAM(s) IV Push two times a day  heparin   Injectable 5000 Unit(s) SubCutaneous every 12 hours  lactulose Syrup 20 Gram(s) Oral two times a day  pantoprazole  Injectable 40 milliGRAM(s) IV Push every 12 hours  polyethylene glycol 3350 17 Gram(s) Oral daily    MEDICATIONS  (PRN):  ALBUTerol    0.083% 2.5 milliGRAM(s) Nebulizer every 6 hours PRN Shortness of Breath and/or Wheezing      ICU Vital Signs Last 24 Hrs  T(C): 36.6 (23 Mar 2021 04:42), Max: 36.7 (22 Mar 2021 14:20)  T(F): 97.8 (23 Mar 2021 04:42), Max: 98.1 (22 Mar 2021 14:20)  HR: 76 (23 Mar 2021 06:55) (68 - 94)  BP: 123/64 (23 Mar 2021 04:42) (117/62 - 129/73)  RR: 18 (23 Mar 2021 04:42) (18 - 20)  SpO2: 98% (23 Mar 2021 06:55) (93% - 98%)       PHYSICAL EXAM:  GEN: Awake, alert. NAD.   HEENT: NCAT, PERRL, EOMI. Mucosa moist. No JVD.  RESP: CTA b/l  CV: RRR. Normal S1/S2. No m/r/g.  ABD: Soft. NT/ND. BS+  EXT: Warm. No clubbing, or cyanosis. LLE in bandaging. Edema 1+ pitting b/l in LE  NEURO: AAOx3. No focal deficits.       LABS:                      8.8    6.18  )-----------( 140      ( 22 Mar 2021 06:36 )             28.8     03-22  138  |  101  |  41<H>  ----------------------------<  92  4.5   |  30  |  1.33<H>    Ca    8.6      22 Mar 2021 06:35  Phos  2.2     03-22  Mg     2.1     03-22    PT/INR - ( 22 Mar 2021 06:36 )   PT: 13.4 sec;   INR: 1.12 ratio        I&O's Summary  22 Mar 2021 07:01  -  23 Mar 2021 07:00  --------------------------------------------------------  IN: 0 mL / OUT: 2150 mL / NET: -2150 mL      Transthoracic Echocardiogram (03.19.21 @ 15:07) >  Conclusions:  1. Increased relative wall thickness with normal left ventricular mass index, consistent with concentric left ventricular remodeling.  2. Normal left ventricular systolic function. No segmental wall motion abnormalities.  3. Severe right atrial enlargement.  4. Severe Right ventricular enlargement 5.8cm  with reduced  right ventricular systolic function.  5. Estimated right ventricular systolic pressure equals 55 mm Hg, assuming right atrial pressure equals 15 mm Hg, consistent with moderate pulmonary hypertension.  6. Normal tricuspid valve. Severe tricuspid regurgitation.  7. Small pericardial effusion. No echocardiographic evidence of pericardial tamponade.  ------------------------------------------------------------------------  Confirmed on  3/19/2021 - 19:30:11 by JASON Kim  ------------------------------------------------------------------------      Transthoracic Echocardiogram (04.22.19 @ 23:23) >  Conclusions:  1. Mitral annular calcification, otherwise normal mitral valve. Moderate mitral regurgitation.  2. Sclerotic aortic valve leaflets with normal opening. Mild-moderate aortic regurgitation.  3. Increased relative wall thickness with normal left ventricular mass index, consistent with concentric left ventricular remodeling.  4. Normal left ventricular systolic function. No segmental wall motion abnormalities.  5. Severe right atrial enlargement.  6. Right ventricular enlargement with decreased right ventricular systolic function.  7. Estimated right ventricular systolic pressure equals 47 mmHg, assuming right atrial pressure equals 8 mm Hg, consistent with mild pulmonary hypertension.  8. Normal tricuspid valve. Severe tricuspid regurgitation.  ------------------------------------------------------------------------  Confirmed on  4/22/2019 - 15:01:01 by Raysa White M.D.  ------------------------------------------------------------------------

## 2021-03-24 NOTE — PROGRESS NOTE ADULT - ATTENDING COMMENTS
x pt seen around 9:15 am. 89F PMH AF (on eliquis), HTN, HLD admitted for worsening delirium, sob, lethargy 1 wk, found to have acute hypoxic respiratory failure likely 2/2 pulm HTN, elevated L filling pressure on IV lasix (cardiology following). Anemia, holding eliquis. d/c to rehab pending diuresis with IV lasix. asymptomatic desat overnight per nurse but unknown waveform on pulse ox. Pt feels good. denies cp/sob. PE: 99% RA taken by me; urine color improving- pink; nad; a+ox3; cta b/l; abd benign; non-focal; 1+ edema up to calves b/l    Acute hypoxic resp failure sec to HFpEF- cont Lasix. good UO.    Anemia- stable. hematuria improving. guiac +. holding apixaban in aggreement with decision by patient and family.

## 2021-03-24 NOTE — PROGRESS NOTE ADULT - PROBLEM SELECTOR PLAN 2
follows with Dr. Allen outpt. Recently dc'ed losartan and torsemide in setting of worsening creatinine; 10 days prior to admission. Currently with peripheral fluid overload on exam likely 2/2 pulmonary HTN; possibly 2/2 primary lung process such as ILD  - IV lasix 40mg BID, urine output adequate. may be able to reduce tomorrow. discharge pending transition to oral diuresis.   - cardiology was consulted - recommend diuresis as above follows with Dr. Allen outpt. Recently dc'ed losartan and torsemide in setting of worsening creatinine; 10 days prior to admission. Currently with peripheral fluid overload on exam likely 2/2 pulmonary HTN; possibly 2/2 primary lung process such as ILD  - IV lasix 40mg BID -> received 1 dose IV lasix 40 today am, will give 40mg po tonight and transition to 40mg PO BID tomorrow. having adequate urine output.   - cardiology following - recommend diuresis as above follows with Dr. Allen outpt. Recently dc'ed losartan and torsemide in setting of worsening creatinine; 10 days prior to admission.   - IV lasix 40mg BID -> received 1 dose IV lasix 40 today am, will give 40mg po tonight and transition to 40mg PO BID tomorrow. having adequate urine output.   - cardiology following - recommend diuresis as above

## 2021-03-24 NOTE — PROGRESS NOTE ADULT - PROBLEM SELECTOR PLAN 1
Possible ILD vs. acute on chronic HF (infection less likely given no focal consolidation on xray, procal unimpressive, change in cough)   CT chest w/ chronic bronchiectasis + small b/l pleural effusions  -Repeat TTE 3/19 EF 65%; enlarged RA and RV, reduced RV systolic function, moderate pulm HTN  - wean O2 as tolerated, now on RA   - DVT study is negative; PE less likely. pt was also on eliquis.   - duonebs prn per pulm  - diuretics as below Possible ILD in setting of  pulm htn vs. acute on chronic HF (infection less likely given no focal consolidation on xray, procal unimpressive, change in cough)   CT chest w/ chronic bronchiectasis + small b/l pleural effusions  -Repeat TTE 3/19 EF 65%; enlarged RA and RV, reduced RV systolic function, moderate pulm HTN  - wean O2 as tolerated, now on RA   - DVT study is negative; PE less likely. pt was also on eliquis.   - duonebs prn per pulm  - diuretics as below acute on chronic HF (infection less likely given no focal consolidation on xray, procal unimpressive, change in cough)   CT chest w/ chronic bronchiectasis + small b/l pleural effusions  -Repeat TTE 3/19 EF 65%; enlarged RA and RV, reduced RV systolic function, moderate pulm HTN  - wean O2 as tolerated, now on RA   - DVT study is negative; PE less likely. pt was also on eliquis.   - duonebs prn per pulm  - diuretics as below

## 2021-03-24 NOTE — PROGRESS NOTE ADULT - PROBLEM SELECTOR PLAN 6
Left leg; initial c/f cellulitis and pt is s/p cefazolin x2d; abx d/c  -low suspicion for cellulitis   -venous stasis ulcer is covered in clean based eschar w/o purulence  -ctm off antibiotics -low suspicion for cellulitis   -venous stasis ulcer is covered in clean based eschar w/o purulence  -ctm off antibiotics

## 2021-03-24 NOTE — DIETITIAN INITIAL EVALUATION ADULT. - OTHER INFO
Pt reports good appetite and PO intake in-house, consuming >75% of meals. Flowsheets confirm the same. Pt denies nausea/vomiting. Noted with fecal incontinence. Last documented BM today (3/24). Observed pt eating lunch, no overt difficulties chewing or swallowing observed. Pt denies difficulties chewing or swallowing. Confirmed NKFA.     Pt with poor ability to provide weight history at this time. States she weighed ~142 pounds at "beginning of pandemic" (March 2020) and lost weight but unsure why (states intake was the same). Question accuracy of reported wt history. Weight history per [facility]: 121 pounds (1/2020), 129 pounds (6/2020), 124 pounds (7/2020), 115 pounds (10/2020), 118 pounds (1/2021). In-house weights this admission: 122.5 pounds (3/24), 124.7 pounds (3/20) suggest overall wt stability/slight gain x >1 year. Edema noted, may be masking lower wt. Will continue to monitor and trend weights.    Encouraged continued good PO intake as tolerated. Discussed with patient importance of adequate nutrition, particularly in context of acute illness. Further diet education deferred at this time, pt not interested, of advanced age, [facility]. No nutrition-related questions or concerns at this time. Complaining of tight bandage on leg - RN notified. Pt made aware RD remains available.

## 2021-03-24 NOTE — DIETITIAN INITIAL EVALUATION ADULT. - ADD RECOMMEND
Encouraged continued good PO intake as tolerated. Patient made aware RD remains available. Monitor PO intake, weight, labs, skin, GI status, diet.

## 2021-03-24 NOTE — DIETITIAN INITIAL EVALUATION ADULT. - ORAL INTAKE PTA/DIET HISTORY
Pt reports good appetite and PO intake PTA. Reports "there were times when I was so hungry I couldn't stop eating." Pt endorses memory issues, question accuracy of information obtained. Per chart review, pt taking calcium carbonate and multivitamin PTA.

## 2021-03-24 NOTE — DIETITIAN INITIAL EVALUATION ADULT. - PROBLEM SELECTOR PLAN 2
follows with Dr. Allen outpt. Recently dc'ed losartan and torsemide. Currently fluid overloaded on exam  -HF consult AM  -will trial lasix 40 IV x1 now

## 2021-03-24 NOTE — PROGRESS NOTE ADULT - ASSESSMENT
89F PMH AF (on eliquis), HTN, HLD admitted for worsening delirium, sob, lethargy 1 wk, found to have acute hypoxic respiratory failure likely 2/2 pulm HTN, elevated L filling pressure on IV lasix (cardiology following). Anemia, holding eliquis. d/c to rehab pending diuresis with IV lasix.

## 2021-03-24 NOTE — PROGRESS NOTE ADULT - SUBJECTIVE AND OBJECTIVE BOX
Contact Information:  Precious Lawrence  PGY-1, Internal Medicine pager [phone]    ESTRELLA ESPINOZA, [id number]    Patient is a 89y old  Female who presents with a chief complaint of SOB, AMS (23 Mar 2021 07:23)      INTERVAL/OVERNIGHT EVENTS:    SUBJECTIVE:    CONSTITUTIONAL: No weakness. No fatigue. No fever.  HEAD: No head trauma.   EYES: No vision changes.  ENT: No hearing changes or tinnitus. No ear pain. No changes in smell. No nasal congestion or discharge. No sore throat. No voice hoarseness.   NECK: No neck pain or stiffness. No lumps.  RESPIRATORY: No cough. No SOB. No wheezing. No hemoptysis.   CARDIOVASCULAR: No chest pain. No palpitations.   GASTROINTESTINAL: No dysphagia. No ABD pain. No distension. No constipation. No diarrhea. No pain with defecation. No hematemesis. No hematochezia or melena.  BACK: No back pain.  GENITOURINARY: No dysuria. No frequency or urgency. No hesitancy. No incontinence. No urinary retention. No suprapubic pain. No hematuria.  EXTREMITY: No swelling.  MUSCULOSKELETAL: No joint pain or swelling. No fractures. No stiffness.    SKIN: No rashes. No itching. No skin, hair, or nail changes.  NEUROLOGICAL: No weakness or paralysis. No lightheadedness or dizziness. No HA. No numbness or tingling.   PSYCHIATRIC: No depression.       OBJECTIVE:  ICU Vital Signs Last 24 Hrs  T(C): 37.1 (24 Mar 2021 05:20), Max: 37.1 (24 Mar 2021 05:20)  T(F): 98.7 (24 Mar 2021 05:20), Max: 98.7 (24 Mar 2021 05:20)  HR: 84 (24 Mar 2021 05:20) (74 - 86)  BP: 125/66 (24 Mar 2021 05:20) (118/67 - 127/62)  BP(mean): --  ABP: --  ABP(mean): --  RR: 18 (24 Mar 2021 05:20) (18 - 18)  SpO2: 100% (24 Mar 2021 05:20) (98% - 100%)    Daily     Daily   I&O's Summary    23 Mar 2021 07:01  -  24 Mar 2021 07:00  --------------------------------------------------------  IN: 0 mL / OUT: 3225 mL / NET: -3225 mL      Adult Advanced Hemodynamics Last 24 Hrs  CVP(mm Hg): --  CVP(cm H2O): --  CO: --  CI: --  PA: --  PA(mean): --  PCWP: --  SVR: --  SVRI: --  PVR: --  PVRI: --      MEDICATIONS  (STANDING):  albuterol/ipratropium for Nebulization 3 milliLiter(s) Nebulizer two times a day  fluticasone propionate 50 MICROgram(s)/spray Nasal Spray 1 Spray(s) Both Nostrils two times a day  furosemide   Injectable 40 milliGRAM(s) IV Push two times a day  heparin   Injectable 5000 Unit(s) SubCutaneous every 12 hours  lactulose Syrup 20 Gram(s) Oral two times a day  pantoprazole  Injectable 40 milliGRAM(s) IV Push every 12 hours  polyethylene glycol 3350 17 Gram(s) Oral daily    MEDICATIONS  (PRN):  ALBUTerol    0.083% 2.5 milliGRAM(s) Nebulizer every 6 hours PRN Shortness of Breath and/or Wheezing    Allergies    No Known Allergies    Intolerances        GENERAL: lying down, no distress  PSYCH: A&O x3  CHEST/LUNG: clear to auscultation bilaterally  HEART: regular rate and rhythm, no murmurs  ABDOMEN: nontender to palpation, no rebound tenderness/guarding  EXTREMITIES: 2+ up to thighs  NEUROLOGY: moves all extremities          03-23    139  |  99  |  36<H>  ----------------------------<  120<H>  3.9   |  31  |  1.17    Ca    8.8      23 Mar 2021 14:50      CAPILLARY BLOOD GLUCOSE                      RADIOLOGY AND ADDITIONAL TESTS:    CONSULTANT NOTES REVIEWED:    CARE DISCUSSED WITH THE FOLLOWING CONSULTANTS/PROVIDERS: Contact Information:  Precious Lawrence  PGY-1, Internal Medicine pager [phone]    ESTRELLA ESPINOZA, [id number]    Patient is a 89y old  Female who presents with a chief complaint of SOB, AMS (23 Mar 2021 07:23)      INTERVAL/OVERNIGHT EVENTS:  - overnight - per nursing desat to 82 but unclear if waveforms were present - she was started on 3L oxygen    SUBJECTIVE:  - am - discontinued oxygen, spo2 99. pt feels well but has continued L leg pain (wound). No chest pain, abdominal pain, or difficulty breathing. very pleasant.       OBJECTIVE:  ICU Vital Signs Last 24 Hrs  T(C): 37.1 (24 Mar 2021 05:20), Max: 37.1 (24 Mar 2021 05:20)  T(F): 98.7 (24 Mar 2021 05:20), Max: 98.7 (24 Mar 2021 05:20)  HR: 84 (24 Mar 2021 05:20) (74 - 86)  BP: 125/66 (24 Mar 2021 05:20) (118/67 - 127/62)  BP(mean): --  ABP: --  ABP(mean): --  RR: 18 (24 Mar 2021 05:20) (18 - 18)  SpO2: 100% (24 Mar 2021 05:20) (98% - 100%)    Daily     Daily   I&O's Summary    23 Mar 2021 07:01  -  24 Mar 2021 07:00  --------------------------------------------------------  IN: 0 mL / OUT: 3225 mL / NET: -3225 mL      MEDICATIONS  (STANDING):  albuterol/ipratropium for Nebulization 3 milliLiter(s) Nebulizer two times a day  fluticasone propionate 50 MICROgram(s)/spray Nasal Spray 1 Spray(s) Both Nostrils two times a day  furosemide   Injectable 40 milliGRAM(s) IV Push two times a day  heparin   Injectable 5000 Unit(s) SubCutaneous every 12 hours  lactulose Syrup 20 Gram(s) Oral two times a day  pantoprazole  Injectable 40 milliGRAM(s) IV Push every 12 hours  polyethylene glycol 3350 17 Gram(s) Oral daily    MEDICATIONS  (PRN):  ALBUTerol    0.083% 2.5 milliGRAM(s) Nebulizer every 6 hours PRN Shortness of Breath and/or Wheezing    Allergies    No Known Allergies    Intolerances        GENERAL: lying down, breathing comfortably on RA, no distress   PSYCH: A&O x3  CHEST/LUNG: clear to auscultation bilaterally  HEART: regular rate and rhythm, no murmurs  ABDOMEN: nontender to palpation, no rebound tenderness/guarding  EXTREMITIES: 1+ edema in lower legs  NEUROLOGY: moves all extremities          03-23    139  |  99  |  36<H>  ----------------------------<  120<H>  3.9   |  31  |  1.17    Ca    8.8      23 Mar 2021 14:50      CAPILLARY BLOOD GLUCOSE                      RADIOLOGY AND ADDITIONAL TESTS:    CONSULTANT NOTES REVIEWED:    CARE DISCUSSED WITH THE FOLLOWING CONSULTANTS/PROVIDERS:

## 2021-03-24 NOTE — PROGRESS NOTE ADULT - ASSESSMENT
89 y/o F with Afib on Eliquis, HTN, & HLD, who presented with shortness of breath.   TTE found to have dilated LA, with preserved EF and decreased RV function with elevated RV pressures. CT chest with bronchiectasis and no signs of fibrosis.       #1.  SOB  DDx: Likely elevated left sided filling pressures leading to pulm HTN.  - significant diuresis over the last shift; would continue IV diuresis with Lasix 40mg IV BID as pt appears still overloaded; likely reduce diuretic tomorrow  - monitor Cr, hgb  - Monitor i/o, monitor daily standing weights  - Telemetry monitoring  - FOBT + and w/ dropping hgb, would c/w holding AC      Sunny Moseley MD  Cardiology Fellow - F1  Text or Call: [phone] 89 y/o F with Afib on Eliquis, HTN, & HLD, who presented with shortness of breath.   TTE found to have dilated LA, with preserved EF and decreased RV function with elevated RV pressures. CT chest with bronchiectasis and no signs of fibrosis.       #1.  SOB  DDx: Likely elevated left sided filling pressures leading to pulm HTN.  - significant diuresis over the last shift; LE edema still prominent; would c/w IV if pt still requiring nasal cannula, however diuretic decreased to PO Lasix 40mg QD yesterday; c/w monitoring respiratory status; recommend right leg compression stocking and elevation   - monitor Cr, hgb  - Monitor i/o, monitor daily standing weights  - Telemetry monitoring  - FOBT + and w/ dropping hgb, would c/w holding AC      Sunny Moseley MD  Cardiology Fellow - F1  Text or Call: [phone] 89 y/o F with Afib on Eliquis, HTN, & HLD, who presented with shortness of breath.   TTE found to have dilated LA, with preserved EF and decreased RV function with elevated RV pressures. CT chest with bronchiectasis and no signs of fibrosis.       #1.  SOB  DDx: Likely elevated left sided filling pressures leading to pulm HTN.  - significant diuresis over the last shift; LE edema still prominent; would c/w IV if pt still requiring nasal cannula, however diuretic decreased to PO Lasix 40mg QD yesterday; c/w monitoring respiratory status; recommend right leg compression stocking and elevation   - monitor Cr, hgb  - Monitor i/o, monitor daily standing weights  - Telemetry monitoring  - FOBT + and w/ dropping hgb, would c/w holding AC      Sunny Moseley MD  Cardiology Fellow - F1  Text or Call: [phone]    Plan discussed with cardiology fellow; patient seen and examined.       I was physically present for the key portions of the evaluation and management (E/M) service provided.    I agree with the above history, physical, and plan which I have reviewed and edited where appropriate.  Chad Bauer M.D.  Cardiology Attending, Consult Service    For Cardiology consults and questions, all Cardiology service information can be found 24/7 on [email or web address], password: [email or web address]

## 2021-03-24 NOTE — PROGRESS NOTE ADULT - SUBJECTIVE AND OBJECTIVE BOX
INTERVAL EVENTS: No o/n events. Reports improved LLE pain. Denies CP, dyspnea, palpitations, presyncope, syncope, f/c/n/v.     REVIEW OF SYSTEMS:  Constitutional:     [X] negative [ ] fevers [ ] chills [ ] weight loss [ ] weight gain  HEENT:                  [X] negative [ ] dry eyes [ ] eye irritation [ ] postnasal drip [ ] nasal congestion  CV:                         [X] negative  [ ] chest pain [ ] orthopnea [ ] palpitations [ ] murmur  Resp:                     [X] negative [ ] cough [ ] shortness of breath [ ] dyspnea [ ] wheezing [ ] sputum [ ] hemoptysis  GI:                          [X] negative [ ] nausea [ ] vomiting [ ] diarrhea [ ] constipation [ ] abd pain [ ] dysphagia   :                        [X] negative [ ] dysuria [ ] nocturia [ ] hematuria [ ] increased urinary frequency  MSK:                      [ ] negative [ ] back pain [X] myalgias [X] arthralgias [ ] fracture  Skin:                       [X] negative [ ] rash [ ] itch  Neuro:                   [X] negative [ ] headache [ ] dizziness [ ] syncope [ ] weakness [ ] numbness  Psych:                    [X] negative [ ] anxiety [ ] depression  Endo:                     [X] negative [ ] diabetes [ ] thyroid problem  Heme/Lymph:      [X] negative [ ] anemia [ ] bleeding problem  Allergic/Immune: [X] negative [ ] itchy eyes [ ] nasal discharge [ ] hives [ ] angioedema    [X] All other systems negative or otherwise described above.  [ ] Unable to assess ROS because ________.    PAST MEDICAL & SURGICAL HISTORY:  H/O cardiac arrhythmia    Atrial fibrillation    MVP (Mitral Valve Prolapse)    Osteoarthritis    Hypertension    Hyperlipidemia    History of right hip replacement    H/O dilation and curettage    Polyp of Ureter  removed many years ago    S/P Tonsillectomy  as child      MEDICATIONS  (STANDING):  albuterol/ipratropium for Nebulization 3 milliLiter(s) Nebulizer two times a day  fluticasone propionate 50 MICROgram(s)/spray Nasal Spray 1 Spray(s) Both Nostrils two times a day  heparin   Injectable 5000 Unit(s) SubCutaneous every 12 hours  lactulose Syrup 20 Gram(s) Oral two times a day  pantoprazole  Injectable 40 milliGRAM(s) IV Push every 12 hours  polyethylene glycol 3350 17 Gram(s) Oral daily    MEDICATIONS  (PRN):  ALBUTerol    0.083% 2.5 milliGRAM(s) Nebulizer every 6 hours PRN Shortness of Breath and/or Wheezing    ICU Vital Signs Last 24 Hrs  T(C): 37.1 (24 Mar 2021 05:20), Max: 37.1 (24 Mar 2021 05:20)  T(F): 98.7 (24 Mar 2021 05:20), Max: 98.7 (24 Mar 2021 05:20)  HR: 84 (24 Mar 2021 05:20) (74 - 86)  BP: 125/66 (24 Mar 2021 05:20) (118/67 - 127/62)  BP(mean): --  ABP: --  ABP(mean): --  RR: 18 (24 Mar 2021 05:20) (18 - 18)  SpO2: 100% (24 Mar 2021 05:20) (98% - 100%)    Daily     Daily     PHYSICAL EXAM:  GEN: Awake, alert. NAD.   HEENT: NCAT, PERRL, EOMI. Mucosa moist. No JVD.  RESP: CTA b/l  CV: RRR. Normal S1/S2. No m/r/g.  ABD: Soft. NT/ND. BS+  EXT: Warm. No edema, clubbing, or cyanosis. LLE in [place]  NEURO: AAOx3. No focal deficits.     LABS:                        9.0    8.56  )-----------( 174      ( 24 Mar 2021 06:48 )             29.7     03-24    139  |  99  |  33<H>  ----------------------------<  100<H>  4.1   |  30  |  1.26    Ca    8.9      24 Mar 2021 06:48  Phos  2.5     03-24  Mg     2.0     03-24              I&O's Summary    23 Mar 2021 07:01  -  24 Mar 2021 07:00  --------------------------------------------------------  IN: 0 mL / OUT: 3225 mL / NET: -3225 mL      BNP    RADIOLOGY & ADDITIONAL STUDIES:    TELEMETRY: reviewed    EKG: reviewed    Transthoracic Echocardiogram (03.19.21 @ 15:07) >  Conclusions:  1. Increased relative wall thickness with normal left ventricular mass index, consistent with concentric left ventricular remodeling.  2. Normal left ventricular systolic function. No segmental wall motion abnormalities.  3. Severe right atrial enlargement.  4. Severe Right ventricular enlargement 5.8cm  with reduced  right ventricular systolic function.  5. Estimated right ventricular systolic pressure equals 55 mm Hg, assuming right atrial pressure equals 15 mm Hg, consistent with moderate pulmonary hypertension.  6. Normal tricuspid valve. Severe tricuspid regurgitation.  7. Small pericardial effusion. No echocardiographic evidence of pericardial tamponade.  ------------------------------------------------------------------------  Confirmed on  3/19/2021 - 19:30:11 by JASON Kim  ------------------------------------------------------------------------      Transthoracic Echocardiogram (04.22.19 @ 23:23) >  Conclusions:  1. Mitral annular calcification, otherwise normal mitral valve. Moderate mitral regurgitation.  2. Sclerotic aortic valve leaflets with normal opening. Mild-moderate aortic regurgitation.  3. Increased relative wall thickness with normal left ventricular mass index, consistent with concentric left ventricular remodeling.  4. Normal left ventricular systolic function. No segmental wall motion abnormalities.  5. Severe right atrial enlargement.  6. Right ventricular enlargement with decreased right ventricular systolic function.  7. Estimated right ventricular systolic pressure equals 47 mmHg, assuming right atrial pressure equals 8 mm Hg, consistent with mild pulmonary hypertension.  8. Normal tricuspid valve. Severe tricuspid regurgitation.  ------------------------------------------------------------------------  Confirmed on  4/22/2019 - 15:01:01 by Raysa White M.D.  ------------------------------------------------------------------------     INTERVAL EVENTS: No o/n events. Reports improved LLE pain. Denies CP, dyspnea, palpitations, presyncope, syncope, f/c/n/v.       REVIEW OF SYSTEMS:  Constitutional:     [X] negative [ ] fevers [ ] chills [ ] weight loss [ ] weight gain  HEENT:                  [X] negative [ ] dry eyes [ ] eye irritation [ ] postnasal drip [ ] nasal congestion  CV:                         [X] negative  [ ] chest pain [ ] orthopnea [ ] palpitations [ ] murmur  Resp:                     [X] negative [ ] cough [ ] shortness of breath [ ] dyspnea [ ] wheezing [ ] sputum [ ] hemoptysis  GI:                          [X] negative [ ] nausea [ ] vomiting [ ] diarrhea [ ] constipation [ ] abd pain [ ] dysphagia   :                        [X] negative [ ] dysuria [ ] nocturia [ ] hematuria [ ] increased urinary frequency  MSK:                      [ ] negative [ ] back pain [X] myalgias [X] arthralgias [ ] fracture  Skin:                       [X] negative [ ] rash [ ] itch  Neuro:                   [X] negative [ ] headache [ ] dizziness [ ] syncope [ ] weakness [ ] numbness  Psych:                    [X] negative [ ] anxiety [ ] depression  Endo:                     [X] negative [ ] diabetes [ ] thyroid problem  Heme/Lymph:      [X] negative [ ] anemia [ ] bleeding problem  Allergic/Immune: [X] negative [ ] itchy eyes [ ] nasal discharge [ ] hives [ ] angioedema  [X] All other systems negative or otherwise described above.      PAST MEDICAL & SURGICAL HISTORY:  Atrial fibrillation  MVP (Mitral Valve Prolapse)  Osteoarthritis  Hypertension  Hyperlipidemia  History of right hip replacement  H/O dilation and curettage  Polyp of Ureter  removed many years ago  S/P Tonsillectomy  as child      MEDICATIONS  (STANDING):  albuterol/ipratropium for Nebulization 3 milliLiter(s) Nebulizer two times a day  fluticasone propionate 50 MICROgram(s)/spray Nasal Spray 1 Spray(s) Both Nostrils two times a day  heparin   Injectable 5000 Unit(s) SubCutaneous every 12 hours  lactulose Syrup 20 Gram(s) Oral two times a day  pantoprazole  Injectable 40 milliGRAM(s) IV Push every 12 hours  polyethylene glycol 3350 17 Gram(s) Oral daily    MEDICATIONS  (PRN):  ALBUTerol    0.083% 2.5 milliGRAM(s) Nebulizer every 6 hours PRN Shortness of Breath and/or Wheezing      Vital Signs Last 24 Hrs  T(C): 37.1 (24 Mar 2021 05:20), Max: 37.1 (24 Mar 2021 05:20)  T(F): 98.7 (24 Mar 2021 05:20), Max: 98.7 (24 Mar 2021 05:20)  HR: 84 (24 Mar 2021 05:20) (74 - 86)  BP: 125/66 (24 Mar 2021 05:20) (118/67 - 127/62)  RR: 18 (24 Mar 2021 05:20) (18 - 18)  SpO2: 100% (24 Mar 2021 05:20) (98% - 100%)    PHYSICAL EXAM:  GEN: Awake, alert. NAD.   HEENT: NCAT, PERRL, EOMI. Mucosa moist. No JVD.  RESP: CTA b/l  CV: RRR. Normal S1/S2. No m/r/g.  ABD: Soft. NT/ND. BS+  EXT: Warm. No edema, clubbing, or cyanosis[facility]  NEURO: AAOx3. No focal deficits.       LABS:                      9.0    8.56  )-----------( 174      ( 24 Mar 2021 06:48 )             29.7     03-24  139  |  99  |  33<H>  ----------------------------<  100<H>  4.1   |  30  |  1.26    Ca    8.9      24 Mar 2021 06:48  Phos  2.5     03-24  Mg     2.0     03-24      I&O's Summary  23 Mar 2021 07:01  -  24 Mar 2021 07:00  --------------------------------------------------------  IN: 0 mL / OUT: 3225 mL / NET: -3225 mL      Transthoracic Echocardiogram (03.19.21 @ 15:07) >  Conclusions:  1. Increased relative wall thickness with normal left ventricular mass index, consistent with concentric left ventricular remodeling.  2. Normal left ventricular systolic function. No segmental wall motion abnormalities.  3. Severe right atrial enlargement.  4. Severe right ventricular enlargement 5.8cm  with reduced  right ventricular systolic function.  5. Estimated right ventricular systolic pressure equals 55 mm Hg, assuming right atrial pressure equals 15 mm Hg, consistent with moderate pulmonary hypertension.  6. Normal tricuspid valve. Severe tricuspid regurgitation.  7. Small pericardial effusion. No echocardiographic evidence of pericardial tamponade.  ------------------------------------------------------------------------  Confirmed on  3/19/2021 - 19:30:11 by JASON Kim  ------------------------------------------------------------------------      Transthoracic Echocardiogram (04.22.19 @ 23:23) >  Conclusions:  1. Mitral annular calcification, otherwise normal mitral valve. Moderate mitral regurgitation.  2. Sclerotic aortic valve leaflets with normal opening. Mild-moderate aortic regurgitation.  3. Increased relative wall thickness with normal left ventricular mass index, consistent with concentric left ventricular remodeling.  4. Normal left ventricular systolic function. No segmental wall motion abnormalities.  5. Severe right atrial enlargement.  6. Right ventricular enlargement with decreased right ventricular systolic function.  7. Estimated right ventricular systolic pressure equals 47 mmHg, assuming right atrial pressure equals 8 mm Hg, consistent with mild pulmonary hypertension.  8. Normal tricuspid valve. Severe tricuspid regurgitation.  ------------------------------------------------------------------------  Confirmed on  4/22/2019 - 15:01:01 by Raysa White M.D.  ------------------------------------------------------------------------

## 2021-03-24 NOTE — PROGRESS NOTE ADULT - PROBLEM SELECTOR PLAN 3
likely 2/2 cardiorenal as improved on diuresis (baseline Cr 1)  - c/w IV lasix 40mg BID  - trend Cr  - avoid nephrotoxic drugs, renally dose meds likely 2/2 cardiorenal as improved on diuresis (baseline Cr 1)  - lasix transition to po 40mg BID tomorrow  - trend Cr  - avoid nephrotoxic drugs, renally dose meds

## 2021-03-24 NOTE — DIETITIAN INITIAL EVALUATION ADULT. - REASON
Deferred at this time, pt eating lunch. Pt appears thin, with some muscle/fat losses noted, likely age-related.

## 2021-03-24 NOTE — PROGRESS NOTE ADULT - PROBLEM SELECTOR PLAN 5
home meds: coreg and eliquis  [id number] 4  - was holding coreg in the setting of bradycardia  - eliquis held due to concern for symptomatic anemia and hematuria; appreciate cardiology recs regarding cessation or resumption.   - will restart coreg today (3/22) as HR in 70s home meds: coreg and eliquis  [id number] 4  - was holding coreg in the setting of bradycardia  - eliquis held due to concern for symptomatic anemia and hematuria; discontinue, cardiology in agreement, family aware   - will restart coreg today (3/22) as HR in 70s

## 2021-03-24 NOTE — PROGRESS NOTE ADULT - PROBLEM SELECTOR PLAN 4
Also reporting intermittent melena outpt. Baseline hgb 11-13 up until 06/20 per outpatient notes. Presents with hgb 9.4. Pt also has persistent gross hematuria initially thought to be due to traumatic mckoy placement  Eliquis held on admission given concern for symptomatic anemia. Hgb stable throughout hospital course despite hematuria.  No melena.   Per [facility] family does not want invasive procedures including endoscopy  c/w IV protonix 40mg BID Also reporting intermittent melena outpt. Baseline hgb 11-13 up until 06/20 per outpatient notes. Presents with hgb 9.4. Pt also has persistent gross hematuria initially thought to be due to traumatic mckoy placement  Eliquis held on admission given concern for symptomatic anemia. Hgb stable throughout hospital course despite hematuria.  No melena reported per nursing 3/24.   Per [facility] family does not want invasive procedures including endoscopy  - transitioned from protonix 40mg IV BID -> PO pantoprazole 40mg BID Also reporting intermittent melena outpt. Baseline hgb 11-13 up until 06/20 per outpatient notes. Presents with hgb 9.4. Pt also has persistent gross hematuria initially thought to be due to traumatic mckoy placement  Eliquis held on admission given concern for symptomatic anemia. Hgb stable throughout hospital course despite hematuria.  No melena reported per nursing 3/24.   Per [facility] family does not want invasive procedures including endoscopy  - transitioned from protonix 40mg IV BID -> po pantoprazole 40mg QD

## 2021-03-24 NOTE — DIETITIAN INITIAL EVALUATION ADULT. - PROBLEM SELECTOR PLAN 1
acute on chronic HF vs infection (less likely given no focal consolidation on xray, procal unimpressive, change in cough) vs PE (less likely given on home eliquis)  -Will obtain repeat TTE (last one 2019 showed severe TR, decreased RV systolic function, moderate MR. LV systolic fx wnl)  -HF treatment as below  -Sputum culture if able to bring out sputum. Antibiotic to cover cellulitis  -wean O2 as tolerated  -duplex to r/o DVT

## 2021-03-24 NOTE — DIETITIAN INITIAL EVALUATION ADULT. - CHIEF COMPLAINT
"found to have acute hypoxic respiratory failure likely 2/2 pulm HTN, elevated L filling pressure on IV lasix (cardiology following)."

## 2021-03-24 NOTE — PROGRESS NOTE ADULT - PROBLEM SELECTOR PLAN 8
DVT: SQh  Diet: DASH diet  PT - recommend rehab. family in support of rehab, pt reluctant. will f/u DVT: SQh  Diet: DASH diet  PT - recommend rehab. family and pt in support of rehab

## 2021-03-24 NOTE — DIETITIAN INITIAL EVALUATION ADULT. - REASON INDICATOR FOR ASSESSMENT
Pt seen for length of stay assessment. Source: EMR, pt. Pt is a 89 yo female with PMH of AF, HTN, and HLD who presented with one week of increasing delirium/confusion/worsening SOB and decreased activity x 1 week, admitted 3/18.

## 2021-03-25 NOTE — DISCHARGE NOTE NURSING/CASE MANAGEMENT/SOCIAL WORK - NSDCVIVACCINE_GEN_ALL_CORE_FT
Severe acute respiratory syndrome coronavirus 2 (SARS-CoV-2) (Coronavirus disease [COVID-19]) vaccine , 2021/3/24 14:51 , Hans Araujo (RN)

## 2021-03-25 NOTE — PROGRESS NOTE ADULT - ASSESSMENT
89F PMH AF (on eliquis), HTN, HLD admitted for worsening delirium, sob, lethargy 1 wk, found to have acute hypoxic respiratory failure likely 2/2 pulm HTN, elevated L filling pressure on IV lasix (cardiology following). Anemia, holding eliquis. transitioned to po lasix BID, discharge to rehab pending placement.

## 2021-03-25 NOTE — PROGRESS NOTE ADULT - ASSESSMENT
89 y/o F with Afib on Eliquis, HTN, & HLD, who presented with shortness of breath.   TTE found to have dilated LA, with preserved EF and decreased RV function with elevated RV pressures. CT chest with bronchiectasis and no signs of fibrosis.       #1.  SOB  DDx: Likely elevated left sided filling pressures leading to pulm HTN.  - c/w PO Lasix 40mg BID and RLE compression stocking/elevation; c/w monitoring respiratory status   - monitor Cr, hgb  - Monitor i/o, monitor daily standing weights  - Telemetry monitoring  - FOBT +, stable hgb; monitor hgb while on DVT ppx      Sunny Moseley MD  Cardiology Fellow - F1  Text or Call: [phone] RX PROGRESS NOTE: Vancomycin Therapeutic Drug Monitoring    Day of therapy: 8    Indication and target trough: Pneumonia (10-15 mcg/mL)    Current vancomycin dosing regimen: No dose since 2/24 due to several supratherapeutic vancomycin levels and worsening renal function.    Most recent height and weight information:  Weight: 58.2 kg (03/01/21 0600)  Height: 5' 5\" (165.1 cm) (02/22/21 1514)    The Following are the Calculated  Current Weights for Karyn Stephen            Adjusted Ideal    57.5 kg 57 kg          Labs:  Serum Creatinine and Creatinine Clearance:  Serum creatinine: 1.94 mg/dL (H) 03/01/21 0435  Estimated creatinine clearance: 22.5 mL/min (A)    Vancomycin Serum Concentrations:  Vancomycin, Random (mcg/mL)   Date/Time Value   03/01/2021 0721 21.3       Assessment:  Serum concentration of 21.3 mcg/mL after holding since last dose on 2/24.   Based on the serum concentration, will continue to hold and re-check vancomycin levels prior to any future dosing.  However, I anticipate at least another few days of supratherapeutic levels given current trends.  Notified Dr. Quezada of status/plan.    Additional serum concentrations may be necessary depending on pathogen identified, risk factors for adverse events, and/or duration of therapy.  Pharmacy will continue to follow and adjust as needed.    Thank you,    Anna Ayala RPH  3/1/2021 8:36 AM     89 y/o F with Afib on Eliquis, HTN, & HLD, who presented with shortness of breath.   TTE found to have dilated LA, with preserved EF and decreased RV function with elevated RV pressures. CT chest with bronchiectasis and no signs of fibrosis.       #1.  SOB  DDx: Likely elevated left sided filling pressures leading to pulm HTN.  - c/w PO Lasix 40mg BID and RLE compression stocking/elevation; c/w monitoring respiratory status   - monitor Cr, hgb  - Monitor i/o, monitor daily standing weights  - Telemetry monitoring  - FOBT +, stable hgb; monitor hgb while on DVT ppx      Sunny Moseley MD  Cardiology Fellow - F1  Text or Call: [phone]    Plan discussed with cardiology fellow; patient seen and examined.       I was physically present for the key portions of the evaluation and management (E/M) service provided.    I agree with the above history, physical, and plan which I have reviewed and edited where appropriate.   Chad Bauer M.D.  Cardiology Attending, Consult Service    For Cardiology consults and questions, all Cardiology service information can be found 24/7 on [email or web address], password: [email or web address]

## 2021-03-25 NOTE — PROGRESS NOTE ADULT - PROBLEM SELECTOR PLAN 1
acute on chronic HF (infection less likely given no focal consolidation on xray, procal unimpressive, change in cough)   CT chest w/ chronic bronchiectasis + small b/l pleural effusions  -Repeat TTE 3/19 EF 65%; enlarged RA and RV, reduced RV systolic function, moderate pulm HTN  - wean O2 as tolerated, now on RA   - DVT study is negative; PE less likely. pt was also on eliquis.   - duonebs prn per pulm  - diuretics as below acute on chronic HF (infection less likely given no focal consolidation on xray, procal unimpressive, change in cough)   CT chest w/ chronic bronchiectasis + small b/l pleural effusions  -Repeat TTE 3/19 EF 65%; enlarged RA and RV, reduced RV systolic function, moderate pulm HTN  - wean O2 as tolerated, now on RA - intermittently needs oxygen overnight  - DVT study is negative; PE less likely. pt was also on eliquis.   - pulm rec: - patient does not have evidence of fibrosis on CT chest from 2019 - has mild bronchiectasis with probably air trapping  - recommend twice a day nebs to promote airway clearance as patient has sputum production in the mornings  - diuretics as below

## 2021-03-25 NOTE — PROGRESS NOTE ADULT - PROBLEM SELECTOR PLAN 4
Also reporting intermittent melena outpt. Baseline hgb 11-13 up until 06/20 per outpatient notes. Presents with hgb 9.4. Pt also has persistent gross hematuria initially thought to be due to traumatic mckoy placement  Eliquis held on admission given concern for symptomatic anemia. Hgb stable throughout hospital course despite hematuria.  No melena reported per nursing 3/24.   Per [facility] family does not want invasive procedures including endoscopy  - transitioned from protonix 40mg IV BID -> po pantoprazole 40mg QD

## 2021-03-25 NOTE — PROGRESS NOTE ADULT - PROBLEM SELECTOR PLAN 8
DVT: SQh  Diet: DASH diet  PT - recommend rehab. family and pt in support of rehab DVT: SQh - continue in rehab. continue compression stockings  Diet: DASH diet  PT - recommend rehab. family and pt in support of rehab

## 2021-03-25 NOTE — PROGRESS NOTE ADULT - SUBJECTIVE AND OBJECTIVE BOX
INTERVAL EVENTS: No o/n events. Denies CP, dyspnea, palpitations, presyncope, syncope, f/c/n/v.     REVIEW OF SYSTEMS:  Constitutional:     [X] negative [ ] fevers [ ] chills [ ] weight loss [ ] weight gain  HEENT:                  [X] negative [ ] dry eyes [ ] eye irritation [ ] postnasal drip [ ] nasal congestion  CV:                         [X] negative  [ ] chest pain [ ] orthopnea [ ] palpitations [ ] murmur  Resp:                     [X] negative [ ] cough [ ] shortness of breath [ ] dyspnea [ ] wheezing [ ] sputum [ ] hemoptysis  GI:                          [X] negative [ ] nausea [ ] vomiting [ ] diarrhea [ ] constipation [ ] abd pain [ ] dysphagia   :                        [X] negative [ ] dysuria [ ] nocturia [ ] hematuria [ ] increased urinary frequency  MSK:                      [X] negative [ ] back pain [ ] myalgias [ ] arthralgias [ ] fracture  Skin:                       [X] negative [ ] rash [ ] itch  Neuro:                   [X] negative [ ] headache [ ] dizziness [ ] syncope [ ] weakness [ ] numbness  Psych:                    [X] negative [ ] anxiety [ ] depression  Endo:                     [X] negative [ ] diabetes [ ] thyroid problem  Heme/Lymph:      [X] negative [ ] anemia [ ] bleeding problem  Allergic/Immune: [X] negative [ ] itchy eyes [ ] nasal discharge [ ] hives [ ] angioedema    [X] All other systems negative or otherwise described above.  [ ] Unable to assess ROS because ________.    PAST MEDICAL & SURGICAL HISTORY:  H/O cardiac arrhythmia    Atrial fibrillation    MVP (Mitral Valve Prolapse)    Osteoarthritis    Hypertension    Hyperlipidemia    History of right hip replacement    H/O dilation and curettage    Polyp of Ureter  removed many years ago    S/P Tonsillectomy  as child      MEDICATIONS  (STANDING):  albuterol/ipratropium for Nebulization 3 milliLiter(s) Nebulizer two times a day  fluticasone propionate 50 MICROgram(s)/spray Nasal Spray 1 Spray(s) Both Nostrils two times a day  furosemide    Tablet 40 milliGRAM(s) Oral two times a day  heparin   Injectable 5000 Unit(s) SubCutaneous every 12 hours  lactulose Syrup 20 Gram(s) Oral two times a day  pantoprazole    Tablet 40 milliGRAM(s) Oral daily  polyethylene glycol 3350 17 Gram(s) Oral daily    MEDICATIONS  (PRN):  acetaminophen   Tablet .. 650 milliGRAM(s) Oral every 8 hours PRN Mild Pain (1 - 3), Moderate Pain (4 - 6)  ALBUTerol    0.083% 2.5 milliGRAM(s) Nebulizer every 6 hours PRN Shortness of Breath and/or Wheezing    ICU Vital Signs Last 24 Hrs  T(C): 36.9 (25 Mar 2021 05:10), Max: 36.9 (25 Mar 2021 05:10)  T(F): 98.5 (25 Mar 2021 05:10), Max: 98.5 (25 Mar 2021 05:10)  HR: 78 (25 Mar 2021 06:08) (77 - 87)  BP: 131/70 (25 Mar 2021 05:10) (114/65 - 131/70)  BP(mean): --  ABP: --  ABP(mean): --  RR: 18 (25 Mar 2021 05:10) (18 - 18)  SpO2: 93% (25 Mar 2021 06:08) (92% - 97%)    Daily     Daily Weight in k.6 (24 Mar 2021 08:42)    PHYSICAL EXAM:  GEN: Awake, alert. NAD.   HEENT: NCAT, PERRL, EOMI. Mucosa moist. +JVD.  RESP: CTA b/l  CV: RRR. Normal S1/S2. No m/r/g.  ABD: Soft. NT/ND. BS+  EXT: Warm. No edema, clubbing, or cyanosis. LLE in bandaging  NEURO: AAOx3. No focal deficits.     LABS:                        9.0    8.56  )-----------( 174      ( 24 Mar 2021 06:48 )             29.7     03-24    139  |  99  |  33<H>  ----------------------------<  100<H>  4.1   |  30  |  1.26    Ca    8.9      24 Mar 2021 06:48  Phos  2.5     03-24  Mg     2.0     03-24              I&O's Summary    24 Mar 2021 07:01  -  25 Mar 2021 07:00  --------------------------------------------------------  IN: 2280 mL / OUT: 2125 mL / NET: 155 mL      BNP    RADIOLOGY & ADDITIONAL STUDIES:    TELEMETRY: reviewed    EKG: reviewed      Transthoracic Echocardiogram (21 @ 15:07) >  Conclusions:  1. Increased relative wall thickness with normal left ventricular mass index, consistent with concentric left ventricular remodeling.  2. Normal left ventricular systolic function. No segmental wall motion abnormalities.  3. Severe right atrial enlargement.  4. Severe right ventricular enlargement 5.8cm  with reduced  right ventricular systolic function.  5. Estimated right ventricular systolic pressure equals 55 mm Hg, assuming right atrial pressure equals 15 mm Hg, consistent with moderate pulmonary hypertension.  6. Normal tricuspid valve. Severe tricuspid regurgitation.  7. Small pericardial effusion. No echocardiographic evidence of pericardial tamponade.  ------------------------------------------------------------------------  Confirmed on  3/19/2021 - 19:30:11 by JASON Kim  ------------------------------------------------------------------------      Transthoracic Echocardiogram (19 @ 23:23) >  Conclusions:  1. Mitral annular calcification, otherwise normal mitral valve. Moderate mitral regurgitation.  2. Sclerotic aortic valve leaflets with normal opening. Mild-moderate aortic regurgitation.  3. Increased relative wall thickness with normal left ventricular mass index, consistent with concentric left ventricular remodeling.  4. Normal left ventricular systolic function. No segmental wall motion abnormalities.  5. Severe right atrial enlargement.  6. Right ventricular enlargement with decreased right ventricular systolic function.  7. Estimated right ventricular systolic pressure equals 47 mmHg, assuming right atrial pressure equals 8 mm Hg, consistent with mild pulmonary hypertension.  8. Normal tricuspid valve. Severe tricuspid regurgitation.  ------------------------------------------------------------------------  Confirmed on  2019 - 15:01:01 by Raysa White M.D.  ------------------------------------------------------------------------   INTERVAL EVENTS: No o/n events. Denies CP, dyspnea, palpitations, presyncope, syncope, f/c/n/v.     REVIEW OF SYSTEMS:  Constitutional:     [X] negative [ ] fevers [ ] chills [ ] weight loss [ ] weight gain  HEENT:                  [X] negative [ ] dry eyes [ ] eye irritation [ ] postnasal drip [ ] nasal congestion  CV:                         [X] negative  [ ] chest pain [ ] orthopnea [ ] palpitations [ ] murmur  Resp:                     [X] negative [ ] cough [ ] shortness of breath [ ] dyspnea [ ] wheezing [ ] sputum [ ] hemoptysis  GI:                          [X] negative [ ] nausea [ ] vomiting [ ] diarrhea [ ] constipation [ ] abd pain [ ] dysphagia   :                        [X] negative [ ] dysuria [ ] nocturia [ ] hematuria [ ] increased urinary frequency  MSK:                      [X] negative [ ] back pain [ ] myalgias [ ] arthralgias [ ] fracture  Skin:                       [X] negative [ ] rash [ ] itch  Neuro:                   [X] negative [ ] headache [ ] dizziness [ ] syncope [ ] weakness [ ] numbness  Psych:                    [X] negative [ ] anxiety [ ] depression  Endo:                     [X] negative [ ] diabetes [ ] thyroid problem  Heme/Lymph:      [X] negative [ ] anemia [ ] bleeding problem  Allergic/Immune: [X] negative [ ] itchy eyes [ ] nasal discharge [ ] hives [ ] angioedema  [X] All other systems negative or otherwise described above.      PAST MEDICAL & SURGICAL HISTORY:  H/O cardiac arrhythmia    Atrial fibrillation    MVP (Mitral Valve Prolapse)    Osteoarthritis    Hypertension    Hyperlipidemia    History of right hip replacement    H/O dilation and curettage    Polyp of Ureter  removed many years ago    S/P Tonsillectomy  as child      MEDICATIONS  (STANDING):  albuterol/ipratropium for Nebulization 3 milliLiter(s) Nebulizer two times a day  fluticasone propionate 50 MICROgram(s)/spray Nasal Spray 1 Spray(s) Both Nostrils two times a day  furosemide    Tablet 40 milliGRAM(s) Oral two times a day  heparin   Injectable 5000 Unit(s) SubCutaneous every 12 hours  lactulose Syrup 20 Gram(s) Oral two times a day  pantoprazole    Tablet 40 milliGRAM(s) Oral daily  polyethylene glycol 3350 17 Gram(s) Oral daily    MEDICATIONS  (PRN):  acetaminophen   Tablet .. 650 milliGRAM(s) Oral every 8 hours PRN Mild Pain (1 - 3), Moderate Pain (4 - 6)  ALBUTerol    0.083% 2.5 milliGRAM(s) Nebulizer every 6 hours PRN Shortness of Breath and/or Wheezing      ICU Vital Signs Last 24 Hrs  T(C): 36.9 (25 Mar 2021 05:10), Max: 36.9 (25 Mar 2021 05:10)  T(F): 98.5 (25 Mar 2021 05:10), Max: 98.5 (25 Mar 2021 05:10)  HR: 78 (25 Mar 2021 06:08) (77 - 87)  BP: 131/70 (25 Mar 2021 05:10) (114/65 - 131/70)  RR: 18 (25 Mar 2021 05:10) (18 - 18)  SpO2: 93% (25 Mar 2021 06:08) (92% - 97%)  Daily Weight in k.6 (24 Mar 2021 08:42)      PHYSICAL EXAM:  GEN: Awake, alert. NAD.   HEENT: NCAT, PERRL, EOMI. Mucosa moist. +JVD.  RESP: CTA b/l  CV: RRR. Normal S1/S2. No m/r/g.  ABD: Soft. NT/ND. BS+  EXT: Warm. No edema, clubbing, or cyanosis. LLE in bandaging  NEURO: AAOx3. No focal deficits.       LABS:                      9.0    8.56  )-----------( 174      ( 24 Mar 2021 06:48 )             29.7     03-24  139  |  99  |  33<H>  ----------------------------<  100<H>  4.1   |  30  |  1.26    Ca    8.9      24 Mar 2021 06:48  Phos  2.5     24  Mg     2.0     24      I&O's Summary  24 Mar 2021 07:01  -  25 Mar 2021 07:00  --------------------------------------------------------  IN: 2280 mL / OUT: 2125 mL / NET: 155 mL      Transthoracic Echocardiogram (21 @ 15:07) >  Conclusions:  1. Increased relative wall thickness with normal left ventricular mass index, consistent with concentric left ventricular remodeling.  2. Normal left ventricular systolic function. No segmental wall motion abnormalities.  3. Severe right atrial enlargement.  4. Severe right ventricular enlargement 5.8cm  with reduced  right ventricular systolic function.  5. Estimated right ventricular systolic pressure equals 55 mm Hg, assuming right atrial pressure equals 15 mm Hg, consistent with moderate pulmonary hypertension.  6. Normal tricuspid valve. Severe tricuspid regurgitation.  7. Small pericardial effusion. No echocardiographic evidence of pericardial tamponade.  ------------------------------------------------------------------------  Confirmed on  3/19/2021 - :30:11 by JASON Kim  ------------------------------------------------------------------------      Transthoracic Echocardiogram (19 @ 23:23) >  Conclusions:  1. Mitral annular calcification, otherwise normal mitral valve. Moderate mitral regurgitation.  2. Sclerotic aortic valve leaflets with normal opening. Mild-moderate aortic regurgitation.  3. Increased relative wall thickness with normal left ventricular mass index, consistent with concentric left ventricular remodeling.  4. Normal left ventricular systolic function. No segmental wall motion abnormalities.  5. Severe right atrial enlargement.  6. Right ventricular enlargement with decreased right ventricular systolic function.  7. Estimated right ventricular systolic pressure equals 47 mmHg, assuming right atrial pressure equals 8 mm Hg, consistent with mild pulmonary hypertension.  8. Normal tricuspid valve. Severe tricuspid regurgitation.  ------------------------------------------------------------------------  Confirmed on  2019 - 15:01:01 by Raysa White M.D.  ------------------------------------------------------------------------

## 2021-03-25 NOTE — PROGRESS NOTE ADULT - PROBLEM SELECTOR PROBLEM 2
Heart failure, diastolic

## 2021-03-25 NOTE — PROGRESS NOTE ADULT - PROBLEM SELECTOR PLAN 5
home meds: coreg and eliquis  [id number]  - was holding coreg in the setting of bradycardia  - eliquis held due to concern for symptomatic anemia and hematuria; discontinue, cardiology in agreement, family aware   - will restart coreg today (3/22) as HR in 70s home meds: coreg and eliquis  [id number]  - was holding coreg in the setting of bradycardia  - eliquis held due to concern for symptomatic anemia and hematuria; discontinue, cardiology in agreement, family aware

## 2021-03-25 NOTE — PROGRESS NOTE ADULT - PROBLEM SELECTOR PLAN 3
likely 2/2 cardiorenal as improved on diuresis (baseline Cr 1)  - lasix transition to po 40mg BID  - trend Cr  - avoid nephrotoxic drugs, renally dose meds

## 2021-03-25 NOTE — PROGRESS NOTE ADULT - PROBLEM SELECTOR PLAN 9
Transitions of Care Status:  1.  Name of PCP:  2.  PCP Contacted on Admission: [ ] Y    [ ] N    3.  PCP contacted at Discharge: [ ] Y    [ ] N    [ ] N/A  4.  Post-Discharge Appointment Date and Location:  5.  Summary of Handoff given to PCP:
urinary retention - mckoy, lainey tov 3/25 retaining. will discharge to rehab with annabelle mckoy in rehab.
Transitions of Care Status:  1.  Name of PCP:  2.  PCP Contacted on Admission: [ ] Y    [ ] N    3.  PCP contacted at Discharge: [ ] Y    [ ] N    [ ] N/A  4.  Post-Discharge Appointment Date and Location:  5.  Summary of Handoff given to PCP:

## 2021-03-25 NOTE — PROGRESS NOTE ADULT - PROBLEM SELECTOR PLAN 7
-monitor on Lasix -monitor on Lasix  -discontinued home metop succinate 50mg QD as on lasix to avoid hypotension

## 2021-03-25 NOTE — DISCHARGE NOTE NURSING/CASE MANAGEMENT/SOCIAL WORK - NSDCFUADDAPPT_GEN_ALL_CORE_FT
[ ] follow up with cardiology within 1 week of discharge  [ ] follow up with primary care provider (Dr. González Miranda at [facility]) within 1 week of discharge to assess volume status and modify diuretic regimen as needed.

## 2021-03-25 NOTE — PROGRESS NOTE ADULT - REASON FOR ADMISSION
SOB, AMS

## 2021-03-25 NOTE — PROGRESS NOTE ADULT - ATTENDING COMMENTS
x c pt seen around 9:30 am. 89F PMH AF (on eliquis), HTN, HLD admitted for worsening delirium, sob, lethargy 1 wk, found to have acute hypoxic respiratory failure likely 2/2 pulm HTN, elevated L filling pressure s/p IV lasix (cardiology following). Anemia, holding eliquis. Pt feels good. mckoy d/c'd but pt states last urination was last night. denies cp/sob. PE: 99% RA taken by me; nad; a+ox3; cta b/l; abd benign; non-focal; 1+ edema up to calves b/l improved    Acute hypoxic resp failure sec to HFpEF- switched to oral Lasix as edema minimal.     Urinary retention- straight cath r/o retention    Anemia- stable. guiac +. holding apixaban in agreement with decision by patient and family.     discharge time 50 min

## 2021-03-25 NOTE — DISCHARGE NOTE NURSING/CASE MANAGEMENT/SOCIAL WORK - PATIENT PORTAL LINK FT
You can access the FollowMyHealth Patient Portal offered by [facility] by registering at the following website: [email or web address]. By joining [facility]’s FollowMyHealth portal, you will also be able to view your health information using other applications (apps) compatible with our system.

## 2021-03-25 NOTE — PROGRESS NOTE ADULT - SUBJECTIVE AND OBJECTIVE BOX
Precious Lawrence PGY1    Patient is a 89y old  Female who presents with a chief complaint of worsening delirium, SOB, lethargy x 1 week (24 Mar 2021 14:47)      SUBJECTIVE / OVERNIGHT EVENTS:  - overnight - received covid vaccine yesterday  - am -     MEDICATIONS  (STANDING):  albuterol/ipratropium for Nebulization 3 milliLiter(s) Nebulizer two times a day  fluticasone propionate 50 MICROgram(s)/spray Nasal Spray 1 Spray(s) Both Nostrils two times a day  furosemide    Tablet 40 milliGRAM(s) Oral two times a day  heparin   Injectable 5000 Unit(s) SubCutaneous every 12 hours  lactulose Syrup 20 Gram(s) Oral two times a day  pantoprazole    Tablet 40 milliGRAM(s) Oral daily  polyethylene glycol 3350 17 Gram(s) Oral daily    MEDICATIONS  (PRN):  acetaminophen   Tablet .. 650 milliGRAM(s) Oral every 8 hours PRN Mild Pain (1 - 3), Moderate Pain (4 - 6)  ALBUTerol    0.083% 2.5 milliGRAM(s) Nebulizer every 6 hours PRN Shortness of Breath and/or Wheezing      CAPILLARY BLOOD GLUCOSE        I&O's Summary    24 Mar 2021 07:01  -  25 Mar 2021 07:00  --------------------------------------------------------  IN: 2280 mL / OUT: 1775 mL / NET: 505 mL        Vital Signs Last 24 Hrs  T(C): 36.9 (25 Mar 2021 05:10), Max: 36.9 (25 Mar 2021 05:10)  T(F): 98.5 (25 Mar 2021 05:10), Max: 98.5 (25 Mar 2021 05:10)  HR: 78 (25 Mar 2021 06:08) (77 - 87)  BP: 131/70 (25 Mar 2021 05:10) (114/65 - 131/70)  BP(mean): --  RR: 18 (25 Mar 2021 05:10) (18 - 18)  SpO2: 93% (25 Mar 2021 06:08) (92% - 97%)    PHYSICAL EXAM:    GENERAL: lying down, breathing comfortably on RA, no distress   PSYCH: A&O x3  CHEST/LUNG: clear to auscultation bilaterally  HEART: regular rate and rhythm, no murmurs  ABDOMEN: nontender to palpation, no rebound tenderness/guarding  EXTREMITIES: 1+ edema in lower legs  NEUROLOGY: moves all extremities    LABS:                        9.0    8.56  )-----------( 174      ( 24 Mar 2021 06:48 )             29.7      03-24    139  |  99  |  33<H>  ----------------------------<  100<H>  4.1   |  30  |  1.26    Ca    8.9      24 Mar 2021 06:48  Phos  2.5     03-24  Mg     2.0     03-24                RADIOLOGY & ADDITIONAL TESTS:    Imaging Personally Reviewed:    Consultant(s) Notes Reviewed:      Care Discussed with Consultants/Other Providers:   Precious Lawrence PGY1    Patient is a 89y old  Female who presents with a chief complaint of worsening delirium, SOB, lethargy x 1 week (24 Mar 2021 14:47)      SUBJECTIVE / OVERNIGHT EVENTS:  - overnight - received covid vaccine yesterday  - am -     -cristian vermau - metop succ 50 qd, atorva 10 qd, torsdemide 10mg bid - 3/7/2021    MEDICATIONS  (STANDING):  albuterol/ipratropium for Nebulization 3 milliLiter(s) Nebulizer two times a day  fluticasone propionate 50 MICROgram(s)/spray Nasal Spray 1 Spray(s) Both Nostrils two times a day  furosemide    Tablet 40 milliGRAM(s) Oral two times a day  heparin   Injectable 5000 Unit(s) SubCutaneous every 12 hours  lactulose Syrup 20 Gram(s) Oral two times a day  pantoprazole    Tablet 40 milliGRAM(s) Oral daily  polyethylene glycol 3350 17 Gram(s) Oral daily    MEDICATIONS  (PRN):  acetaminophen   Tablet .. 650 milliGRAM(s) Oral every 8 hours PRN Mild Pain (1 - 3), Moderate Pain (4 - 6)  ALBUTerol    0.083% 2.5 milliGRAM(s) Nebulizer every 6 hours PRN Shortness of Breath and/or Wheezing      CAPILLARY BLOOD GLUCOSE        I&O's Summary    24 Mar 2021 07:01  -  25 Mar 2021 07:00  --------------------------------------------------------  IN: 2280 mL / OUT: 1775 mL / NET: 505 mL        Vital Signs Last 24 Hrs  T(C): 36.9 (25 Mar 2021 05:10), Max: 36.9 (25 Mar 2021 05:10)  T(F): 98.5 (25 Mar 2021 05:10), Max: 98.5 (25 Mar 2021 05:10)  HR: 78 (25 Mar 2021 06:08) (77 - 87)  BP: 131/70 (25 Mar 2021 05:10) (114/65 - 131/70)  BP(mean): --  RR: 18 (25 Mar 2021 05:10) (18 - 18)  SpO2: 93% (25 Mar 2021 06:08) (92% - 97%)    PHYSICAL EXAM:    GENERAL: lying down, breathing comfortably on RA, no distress   PSYCH: A&O x3  CHEST/LUNG: clear to auscultation bilaterally  HEART: regular rate and rhythm, no murmurs  ABDOMEN: nontender to palpation, no rebound tenderness/guarding  EXTREMITIES: 1+ edema in lower legs  NEUROLOGY: moves all extremities    LABS:                        9.0    8.56  )-----------( 174      ( 24 Mar 2021 06:48 )             29.7      03-24    139  |  99  |  33<H>  ----------------------------<  100<H>  4.1   |  30  |  1.26    Ca    8.9      24 Mar 2021 06:48  Phos  2.5     03-24  Mg     2.0     03-24                RADIOLOGY & ADDITIONAL TESTS:    Imaging Personally Reviewed:    Consultant(s) Notes Reviewed:      Care Discussed with Consultants/Other Providers:   Precious Lawrence PGY1    Patient is a 89y old  Female who presents with a chief complaint of worsening delirium, SOB, lethargy x 1 week (24 Mar 2021 14:47)      SUBJECTIVE / OVERNIGHT EVENTS:  - overnight - received covid vaccine yesterday  - am -         MEDICATIONS  (STANDING):  albuterol/ipratropium for Nebulization 3 milliLiter(s) Nebulizer two times a day  fluticasone propionate 50 MICROgram(s)/spray Nasal Spray 1 Spray(s) Both Nostrils two times a day  furosemide    Tablet 40 milliGRAM(s) Oral two times a day  heparin   Injectable 5000 Unit(s) SubCutaneous every 12 hours  lactulose Syrup 20 Gram(s) Oral two times a day  pantoprazole    Tablet 40 milliGRAM(s) Oral daily  polyethylene glycol 3350 17 Gram(s) Oral daily    MEDICATIONS  (PRN):  acetaminophen   Tablet .. 650 milliGRAM(s) Oral every 8 hours PRN Mild Pain (1 - 3), Moderate Pain (4 - 6)  ALBUTerol    0.083% 2.5 milliGRAM(s) Nebulizer every 6 hours PRN Shortness of Breath and/or Wheezing      CAPILLARY BLOOD GLUCOSE        I&O's Summary    24 Mar 2021 07:01  -  25 Mar 2021 07:00  --------------------------------------------------------  IN: 2280 mL / OUT: 1775 mL / NET: 505 mL        Vital Signs Last 24 Hrs  T(C): 36.9 (25 Mar 2021 05:10), Max: 36.9 (25 Mar 2021 05:10)  T(F): 98.5 (25 Mar 2021 05:10), Max: 98.5 (25 Mar 2021 05:10)  HR: 78 (25 Mar 2021 06:08) (77 - 87)  BP: 131/70 (25 Mar 2021 05:10) (114/65 - 131/70)  BP(mean): --  RR: 18 (25 Mar 2021 05:10) (18 - 18)  SpO2: 93% (25 Mar 2021 06:08) (92% - 97%)    PHYSICAL EXAM:    GENERAL: lying down, breathing comfortably on RA, no distress   PSYCH: A&O x3  CHEST/LUNG: clear to auscultation bilaterally  HEART: regular rate and rhythm, no murmurs  ABDOMEN: nontender to palpation, no rebound tenderness/guarding  EXTREMITIES: 1+ edema in lower legs  NEUROLOGY: moves all extremities    LABS:                        9.0    8.56  )-----------( 174      ( 24 Mar 2021 06:48 )             29.7      03-24    139  |  99  |  33<H>  ----------------------------<  100<H>  4.1   |  30  |  1.26    Ca    8.9      24 Mar 2021 06:48  Phos  2.5     03-24  Mg     2.0     03-24                RADIOLOGY & ADDITIONAL TESTS:    Imaging Personally Reviewed:    Consultant(s) Notes Reviewed:      Care Discussed with Consultants/Other Providers:   Precious Lawrence PGY1    Patient is a 89y old  Female who presents with a chief complaint of worsening delirium, SOB, lethargy x 1 week (24 Mar 2021 14:47)      SUBJECTIVE / OVERNIGHT EVENTS:  - overnight - received covid vaccine yesterday  - am - pt is receiving duoneb treatment. feels fine. no chest pain, no sob. no abdominal pain. mckoy removed but failed tov - mckoy will be replaced prior to discharge to rehab. TOV at discharge.         MEDICATIONS  (STANDING):  albuterol/ipratropium for Nebulization 3 milliLiter(s) Nebulizer two times a day  fluticasone propionate 50 MICROgram(s)/spray Nasal Spray 1 Spray(s) Both Nostrils two times a day  furosemide    Tablet 40 milliGRAM(s) Oral two times a day  heparin   Injectable 5000 Unit(s) SubCutaneous every 12 hours  lactulose Syrup 20 Gram(s) Oral two times a day  pantoprazole    Tablet 40 milliGRAM(s) Oral daily  polyethylene glycol 3350 17 Gram(s) Oral daily    MEDICATIONS  (PRN):  acetaminophen   Tablet .. 650 milliGRAM(s) Oral every 8 hours PRN Mild Pain (1 - 3), Moderate Pain (4 - 6)  ALBUTerol    0.083% 2.5 milliGRAM(s) Nebulizer every 6 hours PRN Shortness of Breath and/or Wheezing      CAPILLARY BLOOD GLUCOSE        I&O's Summary    24 Mar 2021 07:01  -  25 Mar 2021 07:00  --------------------------------------------------------  IN: 2280 mL / OUT: 1775 mL / NET: 505 mL        Vital Signs Last 24 Hrs  T(C): 36.9 (25 Mar 2021 05:10), Max: 36.9 (25 Mar 2021 05:10)  T(F): 98.5 (25 Mar 2021 05:10), Max: 98.5 (25 Mar 2021 05:10)  HR: 78 (25 Mar 2021 06:08) (77 - 87)  BP: 131/70 (25 Mar 2021 05:10) (114/65 - 131/70)  BP(mean): --  RR: 18 (25 Mar 2021 05:10) (18 - 18)  SpO2: 93% (25 Mar 2021 06:08) (92% - 97%)    PHYSICAL EXAM:    GENERAL: lying down, breathing comfortably on RA, no distress   PSYCH: A&O x3  CHEST/LUNG: clear to auscultation bilaterally  HEART: regular rate and rhythm, no murmurs  ABDOMEN: nontender to palpation, no rebound tenderness/guarding  EXTREMITIES: compression stockings on R leg. ace wrap on L leg. 2+ edema in R hip.   NEUROLOGY: moves all extremities    LABS:                        9.0    8.56  )-----------( 174      ( 24 Mar 2021 06:48 )             29.7      03-24    139  |  99  |  33<H>  ----------------------------<  100<H>  4.1   |  30  |  1.26    Ca    8.9      24 Mar 2021 06:48  Phos  2.5     03-24  Mg     2.0     03-24                RADIOLOGY & ADDITIONAL TESTS:    Imaging Personally Reviewed:    Consultant(s) Notes Reviewed:      Care Discussed with Consultants/Other Providers:

## 2021-03-25 NOTE — PROGRESS NOTE ADULT - PROVIDER SPECIALTY LIST ADULT
Cardiology
Internal Medicine

## 2021-03-25 NOTE — PROGRESS NOTE ADULT - PROBLEM SELECTOR PLAN 6
-low suspicion for cellulitis   -venous stasis ulcer is covered in clean based eschar w/o purulence  -ctm off antibiotics

## 2021-03-25 NOTE — PROGRESS NOTE ADULT - PROBLEM SELECTOR PLAN 2
follows with Dr. Allen outpt. Recently dc'ed losartan and torsemide in setting of worsening creatinine; 10 days prior to admission.   - IV lasix 40mg BID -> PO lasix 40mg BID; having adequate urine output.   - cardiology following - recommend diuresis as above follows with Dr. Allen outpt. Recently dc'ed losartan and torsemide in setting of worsening creatinine; 10 days prior to admission.   - IV lasix 40mg BID -> PO lasix 40mg BID; having adequate urine output. Will discharge to rehab with lasix 40mg PO BID and recommend give additional if weight increases or worsening swelling/sob.   - cardiology following - recommend diuresis as above

## 2021-05-11 ENCOUNTER — APPOINTMENT (OUTPATIENT)
Dept: CARDIOLOGY | Facility: CLINIC | Age: 86
End: 2021-05-11

## 2021-09-02 NOTE — PHYSICAL THERAPY INITIAL EVALUATION ADULT - PERTINENT HX OF CURRENT PROBLEM, REHAB EVAL
89 yo F PMH AF, HTN, HLD p/w productive cough x 4 days.  Pt states last week, her daughter-in-law was sick with cold-like symptoms.  Pt visited with her son and daugther in law intermittently in the last week and states for the last 4 days she has been having a "tickle in throat", running nose and chills.  Pt went to PMD today for further care.  In the office of PMD PAST SURGICAL HISTORY:  S/P below knee amputation, left

## 2022-06-03 NOTE — ED ADULT NURSE NOTE - NSIMPLEMENTINTERV_GEN_ALL_ED
[facility]    Surekha Mathur   : 1994   MRN: [id number]   PCP: Lacie Cano, DO   Date: 6/3/2022    Cc:   Chief Complaint   Patient presents with   • Office Visit   • Establish Care   • Wellness        HPI:  Surekha Mathur is a 28 year old female who presents to the clinic for concerns as below.    Environmental allergy - noted in the shed.      Weakness/fatigue episodes - noted to have eye droopyness follwed by significant fatigue and weakness. Occur about 3x/month. Occurs after over-working or stressors.     Acute illness: No  Patient's general health is described as: excellent    The number of hours that the patient is sleeping is: 8 hours    Nutrition status is: Eats 2-3 meal(s) a day; fruits/vegetables - daily  Exercise includes (activity):minimal exercise}  BMI: Body mass index is 26.53 kg/m².    Menses are: regular  Menses last (# of days): 5-7; heavy bleeding, cramping  Date of last pap smear: none yet  History of abnormal pap smears: No  Contraception method includes: none now  Does the patient wish to be screened for HIV? (Rec: All patients ages 15-66 y/o) n  Does the patient wish to be screened for Hep B? (Rec: High Risk patients only) n    Does the patient smoke?    Social History     Tobacco Use   Smoking Status Never Smoker   Smokeless Tobacco Never Used     What is the amount of alcohol the patient drinks on a regular basis?   Social History     Substance and Sexual Activity   Alcohol Use No         Does the patient feel safe at home? y      Health Maintenance Due   Topic Date Due   • Cervical Cancer Screening - <30 3 year  Never done   • DTaP/Tdap/Td Vaccine (7 - Td or Tdap) 10/08/2018   • COVID-19 Vaccine (3 - Booster for Pfizer series) 10/12/2021     Review of Systems as above  Vitals:    22 1050   BP: 114/64   BP Location: RUE - Right upper extremity   Patient Position: Sitting   Cuff Size: Large Adult   Pulse: 73   Temp: 98.4 °F (36.9 °C)    TempSrc: Oral   SpO2: 99%   Weight: 78 kg (171 lb 14.4 oz)   Height: 5' 7.5\" (1.715 m)   LMP: 05/18/2022      Body mass index is 26.53 kg/m².     Physical Exam:    GEN: well-developed, well-nourished, no acute distress.   HENT: normocephalic, atraumatic, hearing grossly intact.   EYES: conjunctiva non-erythematous, JOSE.  NECK: supple, no thyromegaly  CV: RRR. No lower extremity edema noted.  PULM: lungs clear to auscultation bilaterally. No wheezing, rales or rhonchi. No increased work of breathing.   NEURO: alert and oriented x3, speech normal, upper extremity strength and sensation normal bilaterally.  : normal external female genitalia, no significant discharge in the vaginal vault, cervix and os visualized - non erythematous. No CMT or adnexal tenderness on bimanual exam.   SKIN: warm and dry.     A&P:  Surekha was seen today for office visit, establish care and wellness.    Diagnoses and all orders for this visit:    Laboratory examination ordered as part of a complete physical examination  -     CBC NO DIFFERENTIAL; Future  -     COMPREHENSIVE METABOLIC PANEL; Future  -     LIPID PANEL WITH REFLEX; Future  -     GLYCOHEMOGLOBIN; Future  -     VITAMIN D -25 HYDROXY; Future    Screening for cervical cancer  -     PAP ORDER    Fatigue, unspecified type  -     VITAMIN D -25 HYDROXY; Future  -     THYROID STIMULATING HORMONE REFLEX; Future  - No signs of weakness in clinic today - if symptoms remain persistent, may consider neurology referral.        Follow up:   No follow-ups on file.      Advised to call back directly if there are further questions, or if symptoms worsen or fail to improve.   DO Jacqueline Diez [place] [id number] [facility] [address]  Phone: [phone]   Specific interventions were implemented:

## 2022-07-21 NOTE — H&P ADULT - DOES THIS PATIENT HAVE A HISTORY OF OR HAS BEEN DX WITH HEART FAILURE?
Per MD, patient will remain inpatient at this time. Maurisio was updated that patient will not start programming today.    Enma Hancock LPC   7/21/2022     no

## 2022-10-19 NOTE — DIETITIAN INITIAL EVALUATION ADULT. - PROBLEM SELECTOR PLAN 3
Returning Rach call stating patient is not on himself asking if he can be seen with Nuerology. Rach informed patient need to be seen with psychiatry as per Dr. Quiñones notes from last visit. Rach verbally agreed. External psychiatry referral needed. Please advise.   no active discharge, but given erythema but recent worsening memory problem/decreased activity  -will treat cellulitis w/ cefazolin

## 2023-08-30 NOTE — ED ADULT NURSE NOTE - NS ED NURSE LEVEL OF CONSCIOUSNESS MENTAL STATUS
8    • Esophagogastroduodenoscopy transoral flex w/bx single or mult  2021    esophageal neuroendocrine tumor,[place],Dr. Mcintosh @ [facility]   • Extracapsular cataract removal w insert io lens prosth wo ecp Left 2016    Cataract Removal Lens Implant   • Extracapsular cataract removal w insert io lens prosth wo ecp Right 03/10/2016    Cataract Removal Lens Implant   • Open fixatn prox end/neck femur fx Right 2016   • Remove tonsils/adenoids,<11 y/o      at age 7       FAMILY HISTORY:  Family History   Problem Relation Age of Onset   • Lung Disease Mother          at age 74 of emphysema   • Heart Father          at age 74 of MI       SOCIAL HISTORY:  Social History     Tobacco Use   • Smoking status: Former     Current packs/day: 0.00     Types: Cigarettes     Quit date: 1980     Years since quittin.6   • Smokeless tobacco: Never   • Tobacco comments:     smoked approx. 3 packs per week x 20 years   Vaping Use   • Vaping Use: never used   Substance Use Topics   • Alcohol use: Not Currently     Alcohol/week: 0.0 standard drinks of alcohol   • Drug use: No       REVIEW OF SYSTEMS     Limited review of system patient is for history  PHYSICAL EXAM       VITAL SIGNS:  Blood pressure (!) 171/101, pulse 66, temperature 98.5 °F (36.9 °C), temperature source Oral, resp. rate 14, weight 49.4 kg (108 lb 14.5 oz), SpO2 99 %.   GENERAL:  Alert, awake, not in distress or pain.  HEENT:  Atraumatic, normocephalic head.  PERRLA, pink conjunctivae, anicteric sclerae.  EOM movements are intact.  Moist tongue, buccal mucosa.   NECK:  Supple.  No JVD or neck mass.  LYMPH NODES:  No cervical or supraclavicular LAP.   LUNGS:  Normal respiratory effort.  Clear to auscultation bilaterally.  No wheezes.  No crackles.  HEART:  Regular rate and rhythm.  S1, S2 heard normally.  No murmurs, no rubs or gallops.  Normal radial, DP pulses bilaterally.   ABDOMEN:  Full, not distended, soft and nontender.  No  IMAGING     Chest x-ray was unremarkable     ASSESSMENT & PLAN     This patient is a 83 year old female with a PMH as above that presented with the following problems.  We will admit the patient for further evaluation and work up as follows:    # severe hypothyroidism--likely medication non adherence--without myxedema comma present on admission--she has markedly elevated TSH with low T4/T3       --serum cortisol--blood pressure stable at this time and not bradycardic--no need to initiate hydrochlorothiazide at this time        --patient has received levothyroxine at the ED oral, will continue IV levothyroxine inpatient--endocrinology team consulted-  # hypokalemia replace potassium as needed  # hypertension--resume antihypertensive  # chronic normocytic/normochromic--currently no overt bleeding monitor H&H closely  # prolonged QTC--avoid QTC prolonging medications, monitor on tele,-keep potassium at 4 and magnesium above 2  # generalized weakness due to above--continue PT/OT  # 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-follows hemo/Oncology--not clear if patient had any recent follow-up  # pernicious anemia-  # history of esophageal candidiasis based on EGD on 03/2021--at this time no evidence for oral thrush will continue to monitor  # history of pulmonary embolism--will continue PTA Eliquis--rheumatology recommended indefinite anticoagulation per PCP note  # history of CVA with residual left-sided weakness  # seronegative rheumatoid arthritis    Dispo:  Monitor on the floor                      I discussed the case with the Emergency Medicine physician and reviewed the patient's chart in [facility] as available.         Marlo Michaud MD  G Hospitalist      Cooperative/Awake/Alert

## 2023-08-31 NOTE — ED PROVIDER NOTE - CARDIAC RHYTHM
IRREGULAR Winlevi Pregnancy And Lactation Text: This medication is considered safe during pregnancy and breastfeeding.

## 2023-09-20 NOTE — ED ADULT NURSE NOTE - CAS TRG GENERAL NORM CIRC DET
Call and advise patient that the results showed extremely low bone density and high risk for fracture. Patient should begin vit D calcium and follow up in office to discuss results. Please see if patient is able to get prolia injection covered. Strong peripheral pulses
